# Patient Record
Sex: FEMALE | Race: WHITE | NOT HISPANIC OR LATINO | URBAN - METROPOLITAN AREA
[De-identification: names, ages, dates, MRNs, and addresses within clinical notes are randomized per-mention and may not be internally consistent; named-entity substitution may affect disease eponyms.]

---

## 2019-01-28 ENCOUNTER — OUTPATIENT (OUTPATIENT)
Dept: OUTPATIENT SERVICES | Facility: HOSPITAL | Age: 79
LOS: 1 days | Discharge: HOME | End: 2019-01-28

## 2019-01-28 VITALS — WEIGHT: 147.93 LBS | HEIGHT: 60 IN

## 2019-01-28 DIAGNOSIS — I05.0 RHEUMATIC MITRAL STENOSIS: ICD-10-CM

## 2019-01-28 DIAGNOSIS — Z82.69 FAMILY HISTORY OF OTHER DISEASES OF THE MUSCULOSKELETAL SYSTEM AND CONNECTIVE TISSUE: Chronic | ICD-10-CM

## 2019-01-28 RX ORDER — ENOXAPARIN SODIUM 100 MG/ML
70 INJECTION SUBCUTANEOUS
Qty: 14 | Refills: 0
Start: 2019-01-28 | End: 2019-02-03

## 2019-01-28 RX ORDER — APIXABAN 2.5 MG/1
0 TABLET, FILM COATED ORAL
Qty: 0 | Refills: 0 | COMMUNITY

## 2019-01-28 RX ORDER — ENOXAPARIN SODIUM 100 MG/ML
70 INJECTION SUBCUTANEOUS ONCE
Qty: 0 | Refills: 0 | Status: DISCONTINUED | OUTPATIENT
Start: 2019-01-28 | End: 2019-02-12

## 2019-01-28 RX ORDER — WARFARIN SODIUM 2.5 MG/1
1 TABLET ORAL
Qty: 7 | Refills: 0
Start: 2019-01-28 | End: 2019-02-03

## 2019-01-28 NOTE — H&P CARDIOLOGY - HISTORY OF PRESENT ILLNESS
Patient is a 78y F PMH: Mitral stenosis, afib on eliquis, hypothyroidism, DLD,  HTN, never smoker, spinal stenosis sx, L flank benign tumor removed 1973, b/l cataract sx, L knee sx. Pt had recent echo which showed mild to moderate mitral stenosis , study was limited and KAROL recommended for further evaluation. Pt reports SOB with exertion when walking up stairs, no symptoms currently at rest .        REVIEW OF SYSTEMS:  CONSTITUTIONAL: No fever, weight loss, or fatigue  CARDIOLOGY: PAtient denies chest pain, shortness of breath or syncopal episodes.   RESPIRATORY: denies shortness of breath, wheezing  NEUROLOGICAL: NO weakness, no focal deficits to report.  GI: no BRBPR, no N,V,diarrhea.     PHYSICAL EXAM:  · CONSTITUTIONAL:	Well-developed, well nourished      ·RESPIRATORY:   airway patent; breath sounds equal; good air movement; respirations non-labored; clear to auscultation bilaterally; no chest wall tenderness; no intercostal retractions; no rales,rhonchi or wheeze  · CARDIOVASCULAR	regular rate and rhythm  no rub  no murmur  normal PMI  · EXTREMITIES: No cyanosis, clubbing or edema  · VASCULAR: 	Equal and normal pulses (carotid, femoral, dorsalis pedis) Patient is a 78y F PMH: Mitral stenosis, afib on eliquis, hypothyroidism, DLD,  HTN, never smoker, spinal stenosis sx, L flank benign tumor removed 1973, b/l cataract sx, L knee sx. Pt had recent echo which showed mild to moderate mitral stenosis , study was limited and KAROL recommended for further evaluation. Pt reports SOB with exertion when walking up stairs, no symptoms currently at rest .        REVIEW OF SYSTEMS:  CONSTITUTIONAL: No fever, weight loss, or fatigue  CARDIOLOGY: PAtient denies chest pain, shortness of breath or syncopal episodes.   RESPIRATORY: denies shortness of breath, wheezing  NEUROLOGICAL: NO weakness, no focal deficits to report.  GI: no BRBPR, no N,V,diarrhea.     PHYSICAL EXAM:  · CONSTITUTIONAL:	Well-developed, well nourished      ·RESPIRATORY:   airway patent; breath sounds equal; good air movement; respirations non-labored; clear to auscultation bilaterally; no chest wall tenderness; no intercostal retractions; no rales,rhonchi or wheeze  · CARDIOVASCULAR	irregular rhythm  no rub   normal PMI SM at LSB  · EXTREMITIES: No cyanosis, clubbing or edema  · VASCULAR: 	Equal and normal pulses (carotid, femoral, dorsalis pedis)

## 2019-01-28 NOTE — CHART NOTE - NSCHARTNOTEFT_GEN_A_CORE
KAROL Post Procedure Note:    After risks, benefits and alternatives of the procedure were explained, consent was signed and placed in the medical record.  Procedural timeout was taken.  Sedation was administered by anesthesia.  KAROL probe inserted without complication and KAROL performed.  Patient tolerated the procedure well without complication.  Post-procedure vital signs were stable.      KAROL findings:  LVEF- 70 %  Thickened LV  mild MS  mitral annular calcification  mild MR  moderate TR  dilated RA  mild to moderate pulm HTN  + thrombus in DIANA  See full report for findings. KAROL Post Procedure Note:    After risks, benefits and alternatives of the procedure were explained, consent was signed and placed in the medical record.  Procedural timeout was taken.  Sedation was administered by anesthesia.  KAROL probe inserted without complication and KAROL performed.  Patient tolerated the procedure well without complication.  Post-procedure vital signs were stable.      KAROL findings:  LVEF- 70 %  Thickened LV  mild MS  mitral annular calcification  mild MR  moderate TR  dilated RA  mild to moderate pulm HTN  + thrombus in DIANA  See full report for findings.    Plan:  discussed with Dr. Curtis chaparro d/c Eliquis  start coumadin with Lovenox bridging therapy   f/u with Dr. Acevedo tomorrow in office

## 2019-05-24 PROBLEM — E03.9 HYPOTHYROIDISM, UNSPECIFIED: Chronic | Status: ACTIVE | Noted: 2019-01-25

## 2019-05-24 PROBLEM — I10 ESSENTIAL (PRIMARY) HYPERTENSION: Chronic | Status: ACTIVE | Noted: 2019-01-25

## 2019-05-24 PROBLEM — I48.91 UNSPECIFIED ATRIAL FIBRILLATION: Chronic | Status: ACTIVE | Noted: 2019-01-25

## 2019-05-28 ENCOUNTER — OUTPATIENT (OUTPATIENT)
Dept: OUTPATIENT SERVICES | Facility: HOSPITAL | Age: 79
LOS: 1 days | Discharge: HOME | End: 2019-05-28
Payer: MEDICARE

## 2019-05-28 VITALS
HEART RATE: 78 BPM | OXYGEN SATURATION: 98 % | SYSTOLIC BLOOD PRESSURE: 141 MMHG | RESPIRATION RATE: 16 BRPM | HEIGHT: 61 IN | DIASTOLIC BLOOD PRESSURE: 85 MMHG | TEMPERATURE: 98 F | WEIGHT: 151.9 LBS

## 2019-05-28 VITALS — HEIGHT: 61 IN | WEIGHT: 151.9 LBS | TEMPERATURE: 98 F | OXYGEN SATURATION: 98 %

## 2019-05-28 DIAGNOSIS — I51.3 INTRACARDIAC THROMBOSIS, NOT ELSEWHERE CLASSIFIED: ICD-10-CM

## 2019-05-28 DIAGNOSIS — Z82.69 FAMILY HISTORY OF OTHER DISEASES OF THE MUSCULOSKELETAL SYSTEM AND CONNECTIVE TISSUE: Chronic | ICD-10-CM

## 2019-05-28 LAB
APTT BLD: 51.8 SEC — HIGH (ref 27–39.2)
INR BLD: 3.51 RATIO — HIGH (ref 0.65–1.3)
PROTHROM AB SERPL-ACNC: 39.9 SEC — HIGH (ref 9.95–12.87)

## 2019-05-28 PROCEDURE — 93320 DOPPLER ECHO COMPLETE: CPT | Mod: 26

## 2019-05-28 PROCEDURE — 93325 DOPPLER ECHO COLOR FLOW MAPG: CPT | Mod: 26

## 2019-05-28 PROCEDURE — 93312 ECHO TRANSESOPHAGEAL: CPT | Mod: 26

## 2019-05-28 RX ORDER — LOSARTAN/HYDROCHLOROTHIAZIDE 100MG-25MG
1 TABLET ORAL
Qty: 0 | Refills: 0 | DISCHARGE

## 2019-05-28 NOTE — CHART NOTE - NSCHARTNOTEFT_GEN_A_CORE
KAROL Procedure Note:     After risks, benefits and alternatives of the procedure were explained, consent was signed and placed in the medical record.  Procedural timeout was taken.  Sedation was administered by anesthesia.  KAROL probe inserted without complication and KAROL performed.  Patient tolerated the procedure well without complication.  Post-procedure vital signs were stable.    KAROL findings:  LA: mild dilatation , DIANA: there is a small thrombus in the appendage with smoke , and decreased in/out velocities  LV: normal  RA: dilated  RV: dilated  TV: thicknened , there is mod TR  MV: calcified , mild MR  AV : trifileaflet  PV: normal  IA septum : intact  aorta: +atheroma      See full report for findings.  no CV due to persistent DIANA thrombus. KAROL Procedure Note:     After risks, benefits and alternatives of the procedure were explained, consent was signed and placed in the medical record.  Procedural timeout was taken.  Sedation was administered by anesthesia.  KAROL probe inserted without complication and KAROL performed.  Patient tolerated the procedure well without complication.  Post-procedure vital signs were stable.    KAROL findings:  LA: mild dilatation , DIANA: there is a small thrombus in the appendage with smoke , and decreased in/out velocities  LV: normal  RA: dilated  RV: dilated  TV: thickened , there is mod TR, PASP 55  MV: calcified , mild MR  AV : trifileaflet  PV: normal  IA septum : intact  aorta: +atheroma      See full report for findings.  no CV due to persistent DIANA thrombus.

## 2019-05-28 NOTE — H&P CARDIOLOGY - HISTORY OF PRESENT ILLNESS
78 year old female is here for KAROL she had DIANA thrombus in january and was switched to coumadin 78 year old female is here for KAROL she had DIANA thrombus in January and was switched to coumadin.

## 2019-05-28 NOTE — PRE-ANESTHESIA EVALUATION ADULT - NSANTHOSAYNRD_GEN_A_CORE
No. SANGITA screening performed.  STOP BANG Legend: 0-2 = LOW Risk; 3-4 = INTERMEDIATE Risk; 5-8 = HIGH Risk

## 2020-06-22 ENCOUNTER — OUTPATIENT (OUTPATIENT)
Dept: OUTPATIENT SERVICES | Facility: HOSPITAL | Age: 80
LOS: 1 days | Discharge: HOME | End: 2020-06-22
Payer: MEDICARE

## 2020-06-22 VITALS
OXYGEN SATURATION: 99 % | SYSTOLIC BLOOD PRESSURE: 177 MMHG | WEIGHT: 153 LBS | HEIGHT: 61 IN | HEART RATE: 73 BPM | RESPIRATION RATE: 16 BRPM | DIASTOLIC BLOOD PRESSURE: 80 MMHG | TEMPERATURE: 98 F

## 2020-06-22 VITALS — HEART RATE: 70 BPM | DIASTOLIC BLOOD PRESSURE: 90 MMHG | OXYGEN SATURATION: 94 % | SYSTOLIC BLOOD PRESSURE: 177 MMHG

## 2020-06-22 DIAGNOSIS — Z82.69 FAMILY HISTORY OF OTHER DISEASES OF THE MUSCULOSKELETAL SYSTEM AND CONNECTIVE TISSUE: Chronic | ICD-10-CM

## 2020-06-22 PROCEDURE — 93312 ECHO TRANSESOPHAGEAL: CPT | Mod: 26,XU

## 2020-06-22 NOTE — H&P CARDIOLOGY - HISTORY OF PRESENT ILLNESS
79y Female here for elective KAROL for assessment of the DIANA. Pt has h/o Afib on coumadin, HTN, hypothyroidism. Pt had DIANA thrombus seen on 2 KAROL's last yr and pt came here today for a f/u KAROL.  Patient is currently asymptomatic at rest. Pt is compliant with AC and INR is therapeutic.       Physical Exam:    General:  NAD  Neurology: A&Ox3, nonfocal  Eyes: PERRLA/ EOMI.  ENT/Neck: No JVD.    Respiratory: CTA B/L, No wheezing, rales, rhonchi  CV: S1S2 irregular, rubs or gallops.  Abdominal: Soft, NT, ND +BS.  Extremities: No edema, + peripheral pulses B/L.   Skin: No Rashes, Hematoma, Ecchymosis    Labs: Reviewed    KAROL to be performed with assistance of anaesthesiologist and cardiology fellow.

## 2020-06-23 DIAGNOSIS — I48.91 UNSPECIFIED ATRIAL FIBRILLATION: ICD-10-CM

## 2023-10-04 ENCOUNTER — APPOINTMENT (OUTPATIENT)
Dept: CV DIAGNOSITCS | Facility: HOSPITAL | Age: 83
End: 2023-10-04
Payer: MEDICARE

## 2023-10-04 ENCOUNTER — OUTPATIENT (OUTPATIENT)
Dept: OUTPATIENT SERVICES | Facility: HOSPITAL | Age: 83
LOS: 1 days | End: 2023-10-04
Payer: MEDICARE

## 2023-10-04 DIAGNOSIS — Z82.69 FAMILY HISTORY OF OTHER DISEASES OF THE MUSCULOSKELETAL SYSTEM AND CONNECTIVE TISSUE: Chronic | ICD-10-CM

## 2023-10-04 DIAGNOSIS — R06.02 SHORTNESS OF BREATH: ICD-10-CM

## 2023-10-04 DIAGNOSIS — I48.19 OTHER PERSISTENT ATRIAL FIBRILLATION: ICD-10-CM

## 2023-10-04 PROBLEM — Z00.00 ENCOUNTER FOR PREVENTIVE HEALTH EXAMINATION: Status: ACTIVE | Noted: 2023-10-04

## 2023-10-04 PROCEDURE — 93306 TTE W/DOPPLER COMPLETE: CPT | Mod: 26

## 2023-10-04 PROCEDURE — 93306 TTE W/DOPPLER COMPLETE: CPT

## 2023-10-05 DIAGNOSIS — I48.19 OTHER PERSISTENT ATRIAL FIBRILLATION: ICD-10-CM

## 2023-10-05 DIAGNOSIS — R06.02 SHORTNESS OF BREATH: ICD-10-CM

## 2024-01-08 ENCOUNTER — INPATIENT (INPATIENT)
Facility: HOSPITAL | Age: 84
LOS: 9 days | Discharge: NURSING, REHAB OR LONG-TERM CARE | DRG: 177 | End: 2024-01-18
Attending: HOSPITALIST | Admitting: STUDENT IN AN ORGANIZED HEALTH CARE EDUCATION/TRAINING PROGRAM
Payer: MEDICARE

## 2024-01-08 VITALS
DIASTOLIC BLOOD PRESSURE: 75 MMHG | HEART RATE: 71 BPM | TEMPERATURE: 98 F | OXYGEN SATURATION: 96 % | SYSTOLIC BLOOD PRESSURE: 180 MMHG | RESPIRATION RATE: 19 BRPM | WEIGHT: 139.99 LBS

## 2024-01-08 DIAGNOSIS — Z82.69 FAMILY HISTORY OF OTHER DISEASES OF THE MUSCULOSKELETAL SYSTEM AND CONNECTIVE TISSUE: Chronic | ICD-10-CM

## 2024-01-08 LAB
ALBUMIN SERPL ELPH-MCNC: 3.3 G/DL — LOW (ref 3.5–5.2)
ALBUMIN SERPL ELPH-MCNC: 3.3 G/DL — LOW (ref 3.5–5.2)
ALP SERPL-CCNC: 166 U/L — HIGH (ref 30–115)
ALP SERPL-CCNC: 166 U/L — HIGH (ref 30–115)
ALT FLD-CCNC: 21 U/L — SIGNIFICANT CHANGE UP (ref 0–41)
ALT FLD-CCNC: 21 U/L — SIGNIFICANT CHANGE UP (ref 0–41)
ANION GAP SERPL CALC-SCNC: 12 MMOL/L — SIGNIFICANT CHANGE UP (ref 7–14)
ANION GAP SERPL CALC-SCNC: 12 MMOL/L — SIGNIFICANT CHANGE UP (ref 7–14)
APTT BLD: 61.8 SEC — HIGH (ref 27–39.2)
APTT BLD: 61.8 SEC — HIGH (ref 27–39.2)
AST SERPL-CCNC: 29 U/L — SIGNIFICANT CHANGE UP (ref 0–41)
AST SERPL-CCNC: 29 U/L — SIGNIFICANT CHANGE UP (ref 0–41)
BASOPHILS # BLD AUTO: 0.04 K/UL — SIGNIFICANT CHANGE UP (ref 0–0.2)
BASOPHILS # BLD AUTO: 0.04 K/UL — SIGNIFICANT CHANGE UP (ref 0–0.2)
BASOPHILS NFR BLD AUTO: 0.5 % — SIGNIFICANT CHANGE UP (ref 0–1)
BASOPHILS NFR BLD AUTO: 0.5 % — SIGNIFICANT CHANGE UP (ref 0–1)
BILIRUB SERPL-MCNC: 1.1 MG/DL — SIGNIFICANT CHANGE UP (ref 0.2–1.2)
BILIRUB SERPL-MCNC: 1.1 MG/DL — SIGNIFICANT CHANGE UP (ref 0.2–1.2)
BUN SERPL-MCNC: 9 MG/DL — LOW (ref 10–20)
BUN SERPL-MCNC: 9 MG/DL — LOW (ref 10–20)
CALCIUM SERPL-MCNC: 8.2 MG/DL — LOW (ref 8.4–10.4)
CALCIUM SERPL-MCNC: 8.2 MG/DL — LOW (ref 8.4–10.4)
CHLORIDE SERPL-SCNC: 91 MMOL/L — LOW (ref 98–110)
CHLORIDE SERPL-SCNC: 91 MMOL/L — LOW (ref 98–110)
CO2 SERPL-SCNC: 31 MMOL/L — SIGNIFICANT CHANGE UP (ref 17–32)
CO2 SERPL-SCNC: 31 MMOL/L — SIGNIFICANT CHANGE UP (ref 17–32)
CREAT SERPL-MCNC: 0.5 MG/DL — LOW (ref 0.7–1.5)
CREAT SERPL-MCNC: 0.5 MG/DL — LOW (ref 0.7–1.5)
EGFR: 93 ML/MIN/1.73M2 — SIGNIFICANT CHANGE UP
EGFR: 93 ML/MIN/1.73M2 — SIGNIFICANT CHANGE UP
EOSINOPHIL # BLD AUTO: 0.42 K/UL — SIGNIFICANT CHANGE UP (ref 0–0.7)
EOSINOPHIL # BLD AUTO: 0.42 K/UL — SIGNIFICANT CHANGE UP (ref 0–0.7)
EOSINOPHIL NFR BLD AUTO: 5.4 % — SIGNIFICANT CHANGE UP (ref 0–8)
EOSINOPHIL NFR BLD AUTO: 5.4 % — SIGNIFICANT CHANGE UP (ref 0–8)
FLUAV AG NPH QL: SIGNIFICANT CHANGE UP
FLUAV AG NPH QL: SIGNIFICANT CHANGE UP
FLUBV AG NPH QL: SIGNIFICANT CHANGE UP
FLUBV AG NPH QL: SIGNIFICANT CHANGE UP
GAS PNL BLDV: SIGNIFICANT CHANGE UP
GAS PNL BLDV: SIGNIFICANT CHANGE UP
GLUCOSE SERPL-MCNC: 114 MG/DL — HIGH (ref 70–99)
GLUCOSE SERPL-MCNC: 114 MG/DL — HIGH (ref 70–99)
HCT VFR BLD CALC: 26.9 % — LOW (ref 37–47)
HCT VFR BLD CALC: 26.9 % — LOW (ref 37–47)
HGB BLD-MCNC: 8.6 G/DL — LOW (ref 12–16)
HGB BLD-MCNC: 8.6 G/DL — LOW (ref 12–16)
IMM GRANULOCYTES NFR BLD AUTO: 0.5 % — HIGH (ref 0.1–0.3)
IMM GRANULOCYTES NFR BLD AUTO: 0.5 % — HIGH (ref 0.1–0.3)
INR BLD: 7.79 RATIO — CRITICAL HIGH (ref 0.65–1.3)
INR BLD: 7.79 RATIO — CRITICAL HIGH (ref 0.65–1.3)
LYMPHOCYTES # BLD AUTO: 1.32 K/UL — SIGNIFICANT CHANGE UP (ref 1.2–3.4)
LYMPHOCYTES # BLD AUTO: 1.32 K/UL — SIGNIFICANT CHANGE UP (ref 1.2–3.4)
LYMPHOCYTES # BLD AUTO: 16.9 % — LOW (ref 20.5–51.1)
LYMPHOCYTES # BLD AUTO: 16.9 % — LOW (ref 20.5–51.1)
MCHC RBC-ENTMCNC: 27 PG — SIGNIFICANT CHANGE UP (ref 27–31)
MCHC RBC-ENTMCNC: 27 PG — SIGNIFICANT CHANGE UP (ref 27–31)
MCHC RBC-ENTMCNC: 32 G/DL — SIGNIFICANT CHANGE UP (ref 32–37)
MCHC RBC-ENTMCNC: 32 G/DL — SIGNIFICANT CHANGE UP (ref 32–37)
MCV RBC AUTO: 84.3 FL — SIGNIFICANT CHANGE UP (ref 81–99)
MCV RBC AUTO: 84.3 FL — SIGNIFICANT CHANGE UP (ref 81–99)
MONOCYTES # BLD AUTO: 1 K/UL — HIGH (ref 0.1–0.6)
MONOCYTES # BLD AUTO: 1 K/UL — HIGH (ref 0.1–0.6)
MONOCYTES NFR BLD AUTO: 12.8 % — HIGH (ref 1.7–9.3)
MONOCYTES NFR BLD AUTO: 12.8 % — HIGH (ref 1.7–9.3)
NEUTROPHILS # BLD AUTO: 5 K/UL — SIGNIFICANT CHANGE UP (ref 1.4–6.5)
NEUTROPHILS # BLD AUTO: 5 K/UL — SIGNIFICANT CHANGE UP (ref 1.4–6.5)
NEUTROPHILS NFR BLD AUTO: 63.9 % — SIGNIFICANT CHANGE UP (ref 42.2–75.2)
NEUTROPHILS NFR BLD AUTO: 63.9 % — SIGNIFICANT CHANGE UP (ref 42.2–75.2)
NRBC # BLD: 0 /100 WBCS — SIGNIFICANT CHANGE UP (ref 0–0)
NRBC # BLD: 0 /100 WBCS — SIGNIFICANT CHANGE UP (ref 0–0)
NT-PROBNP SERPL-SCNC: 7148 PG/ML — HIGH (ref 0–300)
NT-PROBNP SERPL-SCNC: 7148 PG/ML — HIGH (ref 0–300)
PLATELET # BLD AUTO: 279 K/UL — SIGNIFICANT CHANGE UP (ref 130–400)
PLATELET # BLD AUTO: 279 K/UL — SIGNIFICANT CHANGE UP (ref 130–400)
PMV BLD: 10.2 FL — SIGNIFICANT CHANGE UP (ref 7.4–10.4)
PMV BLD: 10.2 FL — SIGNIFICANT CHANGE UP (ref 7.4–10.4)
POTASSIUM SERPL-MCNC: 2.5 MMOL/L — CRITICAL LOW (ref 3.5–5)
POTASSIUM SERPL-MCNC: 2.5 MMOL/L — CRITICAL LOW (ref 3.5–5)
POTASSIUM SERPL-SCNC: 2.5 MMOL/L — CRITICAL LOW (ref 3.5–5)
POTASSIUM SERPL-SCNC: 2.5 MMOL/L — CRITICAL LOW (ref 3.5–5)
PROT SERPL-MCNC: 6 G/DL — SIGNIFICANT CHANGE UP (ref 6–8)
PROT SERPL-MCNC: 6 G/DL — SIGNIFICANT CHANGE UP (ref 6–8)
PROTHROM AB SERPL-ACNC: >40 SEC — HIGH (ref 9.95–12.87)
PROTHROM AB SERPL-ACNC: >40 SEC — HIGH (ref 9.95–12.87)
RBC # BLD: 3.19 M/UL — LOW (ref 4.2–5.4)
RBC # BLD: 3.19 M/UL — LOW (ref 4.2–5.4)
RBC # FLD: 15.1 % — HIGH (ref 11.5–14.5)
RBC # FLD: 15.1 % — HIGH (ref 11.5–14.5)
RSV RNA NPH QL NAA+NON-PROBE: SIGNIFICANT CHANGE UP
RSV RNA NPH QL NAA+NON-PROBE: SIGNIFICANT CHANGE UP
SARS-COV-2 RNA SPEC QL NAA+PROBE: DETECTED
SARS-COV-2 RNA SPEC QL NAA+PROBE: DETECTED
SODIUM SERPL-SCNC: 134 MMOL/L — LOW (ref 135–146)
SODIUM SERPL-SCNC: 134 MMOL/L — LOW (ref 135–146)
TROPONIN T, HIGH SENSITIVITY RESULT: 106 NG/L — CRITICAL HIGH (ref 6–13)
TROPONIN T, HIGH SENSITIVITY RESULT: 106 NG/L — CRITICAL HIGH (ref 6–13)
WBC # BLD: 7.82 K/UL — SIGNIFICANT CHANGE UP (ref 4.8–10.8)
WBC # BLD: 7.82 K/UL — SIGNIFICANT CHANGE UP (ref 4.8–10.8)
WBC # FLD AUTO: 7.82 K/UL — SIGNIFICANT CHANGE UP (ref 4.8–10.8)
WBC # FLD AUTO: 7.82 K/UL — SIGNIFICANT CHANGE UP (ref 4.8–10.8)

## 2024-01-08 PROCEDURE — 71045 X-RAY EXAM CHEST 1 VIEW: CPT | Mod: 26

## 2024-01-08 PROCEDURE — 93010 ELECTROCARDIOGRAM REPORT: CPT

## 2024-01-08 PROCEDURE — 99285 EMERGENCY DEPT VISIT HI MDM: CPT

## 2024-01-08 RX ORDER — POTASSIUM CHLORIDE 20 MEQ
40 PACKET (EA) ORAL ONCE
Refills: 0 | Status: COMPLETED | OUTPATIENT
Start: 2024-01-08 | End: 2024-01-08

## 2024-01-08 RX ORDER — POTASSIUM CHLORIDE 20 MEQ
20 PACKET (EA) ORAL ONCE
Refills: 0 | Status: COMPLETED | OUTPATIENT
Start: 2024-01-08 | End: 2024-01-08

## 2024-01-08 NOTE — ED PROVIDER NOTE - WR INTERPRETATION 1
CXR inter by me--+right pleural effusion, +increased vascular markings, no ptx, +sternotomy wires noted

## 2024-01-08 NOTE — ED PROVIDER NOTE - CONSULTANT FREE TEXT FOR MDM DISCUSSED CASE WITH QUESTION
fito fito, [facility] fito, [facility] cardio fito, ICU fellow

## 2024-01-08 NOTE — ED PROVIDER NOTE - WHICH SHOWED
#1 EKG: wide QRS reg rhythm, LAD, Vent Rate 61, , QTc 489    CXR--inter by me--+right pleural effusion, +vascular congestion, sternal wires noted

## 2024-01-08 NOTE — ED PROVIDER NOTE - PROGRESS NOTE DETAILS
KA - patient became hypoxic to 86 RA upon marching in place. 2L nasal cannula initiated with return to baseline. Dianne - Cardio evaluated patient. Discussed disposition planning. No cardio tele beds available. Patient does not require cardiac SDU at this time. Admit to med tele pending CT chest read. KA - Cardio fellow consulted will see patient. Discussed case with ICU fellow, pt approved for SDU. Signed out pt to MAR.

## 2024-01-08 NOTE — ED PROVIDER NOTE - ATTENDING APP SHARED VISIT CONTRIBUTION OF CARE
83-year-old female with past medical history of A-fib, HTN, hypothyroid, cardiac valve disease, [facility] heart surgeries (had one valve repair and another valve replacement with porcine valve), pacemaker placement on 12/17, sent home on 12/20, but returned to hospital on 12/21 secondary to shortness of breath and fluid buildup, went to [facility] and then transferred back to Health system and had chest tube placed (now removed), and PICC line placed for IV antibiotics for pneumonia (ertapenem to be continued until January 20) but she continued to have cough/shortness of breath and finally tested for COVID which was positive on January 5, 2024.  PMD evaluated patient and concerned about her weakness/shortness of breath/COVID status and sent her to [facility] ER for further evaluation.  Daughter mentions that patient's INR was also elevated (result was 8 and outpatient lab).  Patient still able to tolerate p.o. intake but has decreased appetite, and easily fatigued.  When patient tries to walk she gets acutely short of breath.  Also noted to have increased swelling of her bilateral lower extremities.  Denies any complaints of fever/chills/abdominal pain/ complaints/rashes.    Agree with PA exam management.  Will check labs, urine, EKG, x-ray, CT chest and cardiac fellow evaluation.  Patient to be admitted as pulse ox checked when patient moving and it drops to the 80s on room air.  Patient therefore placed on 2 L nasal cannula. Pt to be admitted. 83-year-old female with past medical history of A-fib, HTN, hypothyroid, cardiac valve disease, admitted at [facility] December 13, 2023 for open heart surgeries (had one valve repair and another valve replacement with porcine valve), pacemaker placement on 12/17, sent home on 12/20, but returned to hospital on 12/21 secondary to shortness of breath and fluid buildup, went to [facility] and then transferred back to [facility] and had chest tube placed (now removed), and PICC line placed for IV antibiotics for pneumonia (ertapenem to be continued until January 20) but she continued to have cough/shortness of breath and finally tested for COVID which was positive on January 5, 2024.  PMD evaluated patient and concerned about her weakness/shortness of breath/COVID status and sent her to [facility] ER for further evaluation.  Daughter mentions that patient's INR was also elevated (result was 8 and outpatient lab).  Patient still able to tolerate p.o. intake but has decreased appetite, and easily fatigued.  When patient tries to walk she gets acutely short of breath.  Also noted to have increased swelling of her bilateral lower extremities.  Denies any complaints of fever/chills/abdominal pain/ complaints/rashes.    Agree with PA exam management.  Will check labs, urine, EKG, x-ray, CT chest and cardiac fellow evaluation.  Patient to be admitted as pulse ox checked when patient moving and it drops to the 80s on room air.  Patient therefore placed on 2 L nasal cannula. Pt to be admitted.

## 2024-01-08 NOTE — ED PROVIDER NOTE - CLINICAL SUMMARY MEDICAL DECISION MAKING FREE TEXT BOX
83-year-old female with past medical history of A-fib, HTN, hypothyroid, cardiac valve disease, admitted at [facility] December 13, 2023 for open heart surgeries (had one valve repair and another valve replacement with porcine valve), pacemaker placement on 12/17, sent home on 12/20, but returned to hospital on 12/21 secondary to shortness of breath and fluid buildup, went to [facility] and then transferred back to [facility] and had chest tube placed (now removed), and PICC line placed for IV antibiotics for pneumonia (ertapenem to be continued until January 20) but she continued to have cough/shortness of breath and finally tested for COVID which was positive on January 5, 2024.  PMD evaluated patient and concerned about her weakness/shortness of breath/COVID status and sent her to [facility] ER for further evaluation.  Daughter mentions that patient's INR was also elevated (result was 8 and outpatient lab).  Patient still able to tolerate p.o. intake but has decreased appetite, and easily fatigued.  When patient tries to walk she gets acutely short of breath.  Also noted to have increased swelling of her bilateral lower extremities.  Denies any complaints of fever/chills/abdominal pain/ complaints/rashes.    Agree with PA exam management.  Ordered labs, urine, EKG, x-ray, CT chest and cardiac fellow evaluation, ICU consult.  Patient to be admitted as +covid and pulse ox when patient moving drops to the 80s on room air.  Patient therefore placed on 2 L nasal cannula. Pt to be admitted because of elevated trop, elevated bnp, low K, and elevated INR. Discussed with ICU fellow, admit to SDU 83-year-old female with past medical history of A-fib, HTN, hypothyroid, cardiac valve disease, admitted at [facility] December 13, 2023 for open heart surgeries (had one valve repair and another valve replacement with porcine valve), pacemaker placement on 12/17, sent home on 12/20, but returned to hospital on 12/21 secondary to shortness of breath and fluid buildup, went to [facility] and then transferred back to [facility] and had chest tube placed (now removed), and PICC line placed for IV antibiotics for pneumonia (ertapenem to be continued until January 20) but she continued to have cough/shortness of breath and finally tested for COVID which was positive on January 5, 2024.  PMD evaluated patient and concerned about her weakness/shortness of breath/COVID status and sent her to [facility] ER for further evaluation.  Daughter mentions that patient's INR was also elevated (result was 8 and outpatient lab).  Patient still able to tolerate p.o. intake but has decreased appetite, and easily fatigued.  When patient tries to walk she gets acutely short of breath.  Also noted to have increased swelling of her bilateral lower extremities.  Denies any complaints of fever/chills/abdominal pain/ complaints/rashes.    Agree with PA exam management.  Ordered labs, urine, EKG, x-ray, CT chest and cardiac fellow evaluation, ICU consult.  Patient to be admitted as +covid and pulse ox when patient moving drops to the 80s on room air.  Patient therefore placed on 2 L nasal cannula. Pt to be admitted because of elevated trop, elevated bnp, low K, and elevated INR. Discussed with ICU fellow, admit to SDU

## 2024-01-08 NOTE — ED PROVIDER NOTE - OBJECTIVE STATEMENT
Patient is a 83-year-old female with past medical history of hypertension, hypothyroidism, valve replacement surgery and pacemaker placement in mid December, further admission for pneumonia and discharged with left-sided PICC line on ertapenem on warfarin presents for evaluation of cough and shortness of breath.  Patient tested COVID-positive on January 5.  She can ambulate a very short distance which causes severe shortness of breath.  She was told her INR was severely elevated on outpatient labs.  She was ultimately referred in by a visiting primary care provider for concerns of her respiratory status.  She denies any fever, chest pain, abdominal pain, urinary complaints.

## 2024-01-08 NOTE — ED PROVIDER NOTE - WHO RECOMMENDED
cardiac fellow Judah, no INR reversal with vit K, no cardiac step down, will see pt. cardiac fellow Judah, no INR reversal with vit K, no cardiac step down, will see pt. after seeing patient oral vit K ok to give and to hold lasix until potassium is corrected.

## 2024-01-08 NOTE — ED PROVIDER NOTE - PHYSICAL EXAMINATION
As Follows:  CONST: Well appearing in NAD  EYES: PERRL, EOMI, Sclera and conjunctiva clear.   ENT: No nasal discharge. Oropharynx normal appearing, no erythema or exudates. Uvula midline  CARD: No murmurs, rubs, or gallops; Normal rate and rhythm  RESP: BS Equal B/L, No wheezes, rhonchi or rales. Hypoxia to 86 on RA. Comfortable on 2L NC.   GI: Soft, non-tender, non-distended.  SKIN: Post surgical sites without acute drainage or discharge. Otherwise Warm, dry, no acute rashes. MMM  NEURO: A&Ox3, No focal deficits. Strength and sensation intact.

## 2024-01-08 NOTE — ED PROVIDER NOTE - CARE PLAN
Principal Discharge DX:	COVID  Secondary Diagnosis:	Hypoxia  Secondary Diagnosis:	Coumadin toxicity   1 Principal Discharge DX:	COVID  Secondary Diagnosis:	Hypoxia  Secondary Diagnosis:	Coumadin toxicity  Secondary Diagnosis:	Elevated troponin  Secondary Diagnosis:	Fluid overload

## 2024-01-09 DIAGNOSIS — B97.89 OTHER VIRAL AGENTS AS THE CAUSE OF DISEASES CLASSIFIED ELSEWHERE: ICD-10-CM

## 2024-01-09 DIAGNOSIS — R77.8 OTHER SPECIFIED ABNORMALITIES OF PLASMA PROTEINS: ICD-10-CM

## 2024-01-09 LAB
ALBUMIN SERPL ELPH-MCNC: 3.3 G/DL — LOW (ref 3.5–5.2)
ALBUMIN SERPL ELPH-MCNC: 3.3 G/DL — LOW (ref 3.5–5.2)
ALBUMIN SERPL ELPH-MCNC: 3.4 G/DL — LOW (ref 3.5–5.2)
ALBUMIN SERPL ELPH-MCNC: 3.4 G/DL — LOW (ref 3.5–5.2)
ALBUMIN SERPL ELPH-MCNC: 3.5 G/DL — SIGNIFICANT CHANGE UP (ref 3.5–5.2)
ALBUMIN SERPL ELPH-MCNC: 3.5 G/DL — SIGNIFICANT CHANGE UP (ref 3.5–5.2)
ALP SERPL-CCNC: 156 U/L — HIGH (ref 30–115)
ALP SERPL-CCNC: 156 U/L — HIGH (ref 30–115)
ALP SERPL-CCNC: 161 U/L — HIGH (ref 30–115)
ALP SERPL-CCNC: 161 U/L — HIGH (ref 30–115)
ALP SERPL-CCNC: 163 U/L — HIGH (ref 30–115)
ALP SERPL-CCNC: 163 U/L — HIGH (ref 30–115)
ALT FLD-CCNC: 21 U/L — SIGNIFICANT CHANGE UP (ref 0–41)
ALT FLD-CCNC: 21 U/L — SIGNIFICANT CHANGE UP (ref 0–41)
ALT FLD-CCNC: 22 U/L — SIGNIFICANT CHANGE UP (ref 0–41)
ALT FLD-CCNC: 22 U/L — SIGNIFICANT CHANGE UP (ref 0–41)
ALT FLD-CCNC: 23 U/L — SIGNIFICANT CHANGE UP (ref 0–41)
ALT FLD-CCNC: 23 U/L — SIGNIFICANT CHANGE UP (ref 0–41)
ANION GAP SERPL CALC-SCNC: 14 MMOL/L — SIGNIFICANT CHANGE UP (ref 7–14)
ANION GAP SERPL CALC-SCNC: 14 MMOL/L — SIGNIFICANT CHANGE UP (ref 7–14)
ANION GAP SERPL CALC-SCNC: 16 MMOL/L — HIGH (ref 7–14)
ANION GAP SERPL CALC-SCNC: 16 MMOL/L — HIGH (ref 7–14)
APTT BLD: 61.4 SEC — HIGH (ref 27–39.2)
APTT BLD: 61.4 SEC — HIGH (ref 27–39.2)
AST SERPL-CCNC: 29 U/L — SIGNIFICANT CHANGE UP (ref 0–41)
AST SERPL-CCNC: 29 U/L — SIGNIFICANT CHANGE UP (ref 0–41)
AST SERPL-CCNC: 32 U/L — SIGNIFICANT CHANGE UP (ref 0–41)
AST SERPL-CCNC: 32 U/L — SIGNIFICANT CHANGE UP (ref 0–41)
AST SERPL-CCNC: 34 U/L — SIGNIFICANT CHANGE UP (ref 0–41)
AST SERPL-CCNC: 34 U/L — SIGNIFICANT CHANGE UP (ref 0–41)
BASOPHILS # BLD AUTO: 0.02 K/UL — SIGNIFICANT CHANGE UP (ref 0–0.2)
BASOPHILS # BLD AUTO: 0.02 K/UL — SIGNIFICANT CHANGE UP (ref 0–0.2)
BASOPHILS NFR BLD AUTO: 0.2 % — SIGNIFICANT CHANGE UP (ref 0–1)
BASOPHILS NFR BLD AUTO: 0.2 % — SIGNIFICANT CHANGE UP (ref 0–1)
BILIRUB DIRECT SERPL-MCNC: 0.4 MG/DL — HIGH (ref 0–0.3)
BILIRUB INDIRECT FLD-MCNC: 0.6 MG/DL — SIGNIFICANT CHANGE UP (ref 0.2–1.2)
BILIRUB INDIRECT FLD-MCNC: 0.6 MG/DL — SIGNIFICANT CHANGE UP (ref 0.2–1.2)
BILIRUB INDIRECT FLD-MCNC: 0.7 MG/DL — SIGNIFICANT CHANGE UP (ref 0.2–1.2)
BILIRUB INDIRECT FLD-MCNC: 0.7 MG/DL — SIGNIFICANT CHANGE UP (ref 0.2–1.2)
BILIRUB SERPL-MCNC: 1 MG/DL — SIGNIFICANT CHANGE UP (ref 0.2–1.2)
BILIRUB SERPL-MCNC: 1 MG/DL — SIGNIFICANT CHANGE UP (ref 0.2–1.2)
BILIRUB SERPL-MCNC: 1.1 MG/DL — SIGNIFICANT CHANGE UP (ref 0.2–1.2)
BUN SERPL-MCNC: 10 MG/DL — SIGNIFICANT CHANGE UP (ref 10–20)
BUN SERPL-MCNC: 10 MG/DL — SIGNIFICANT CHANGE UP (ref 10–20)
BUN SERPL-MCNC: 9 MG/DL — LOW (ref 10–20)
BUN SERPL-MCNC: 9 MG/DL — LOW (ref 10–20)
CALCIUM SERPL-MCNC: 7.6 MG/DL — LOW (ref 8.4–10.5)
CALCIUM SERPL-MCNC: 7.6 MG/DL — LOW (ref 8.4–10.5)
CALCIUM SERPL-MCNC: 7.7 MG/DL — LOW (ref 8.4–10.5)
CALCIUM SERPL-MCNC: 7.7 MG/DL — LOW (ref 8.4–10.5)
CHLORIDE SERPL-SCNC: 96 MMOL/L — LOW (ref 98–110)
CHLORIDE SERPL-SCNC: 96 MMOL/L — LOW (ref 98–110)
CHLORIDE SERPL-SCNC: 97 MMOL/L — LOW (ref 98–110)
CHLORIDE SERPL-SCNC: 97 MMOL/L — LOW (ref 98–110)
CO2 SERPL-SCNC: 23 MMOL/L — SIGNIFICANT CHANGE UP (ref 17–32)
CO2 SERPL-SCNC: 23 MMOL/L — SIGNIFICANT CHANGE UP (ref 17–32)
CO2 SERPL-SCNC: 26 MMOL/L — SIGNIFICANT CHANGE UP (ref 17–32)
CO2 SERPL-SCNC: 26 MMOL/L — SIGNIFICANT CHANGE UP (ref 17–32)
CREAT SERPL-MCNC: 0.5 MG/DL — LOW (ref 0.7–1.5)
CREAT SERPL-MCNC: <0.5 MG/DL — LOW (ref 0.7–1.5)
EGFR: 93 ML/MIN/1.73M2 — SIGNIFICANT CHANGE UP
EGFR: 98 ML/MIN/1.73M2 — SIGNIFICANT CHANGE UP
EOSINOPHIL # BLD AUTO: 0.03 K/UL — SIGNIFICANT CHANGE UP (ref 0–0.7)
EOSINOPHIL # BLD AUTO: 0.03 K/UL — SIGNIFICANT CHANGE UP (ref 0–0.7)
EOSINOPHIL NFR BLD AUTO: 0.3 % — SIGNIFICANT CHANGE UP (ref 0–8)
EOSINOPHIL NFR BLD AUTO: 0.3 % — SIGNIFICANT CHANGE UP (ref 0–8)
GLUCOSE SERPL-MCNC: 116 MG/DL — HIGH (ref 70–99)
GLUCOSE SERPL-MCNC: 116 MG/DL — HIGH (ref 70–99)
GLUCOSE SERPL-MCNC: 125 MG/DL — HIGH (ref 70–99)
GLUCOSE SERPL-MCNC: 125 MG/DL — HIGH (ref 70–99)
HCT VFR BLD CALC: 26.9 % — LOW (ref 37–47)
HCT VFR BLD CALC: 26.9 % — LOW (ref 37–47)
HGB BLD-MCNC: 8.5 G/DL — LOW (ref 12–16)
HGB BLD-MCNC: 8.5 G/DL — LOW (ref 12–16)
IMM GRANULOCYTES NFR BLD AUTO: 0.4 % — HIGH (ref 0.1–0.3)
IMM GRANULOCYTES NFR BLD AUTO: 0.4 % — HIGH (ref 0.1–0.3)
INR BLD: 5.02 RATIO — CRITICAL HIGH (ref 0.65–1.3)
INR BLD: 5.02 RATIO — CRITICAL HIGH (ref 0.65–1.3)
INR BLD: 7.74 RATIO — CRITICAL HIGH (ref 0.65–1.3)
INR BLD: 7.74 RATIO — CRITICAL HIGH (ref 0.65–1.3)
IRON SATN MFR SERPL: 14 % — LOW (ref 15–50)
IRON SATN MFR SERPL: 14 % — LOW (ref 15–50)
IRON SATN MFR SERPL: 35 UG/DL — SIGNIFICANT CHANGE UP (ref 35–150)
IRON SATN MFR SERPL: 35 UG/DL — SIGNIFICANT CHANGE UP (ref 35–150)
LYMPHOCYTES # BLD AUTO: 0.8 K/UL — LOW (ref 1.2–3.4)
LYMPHOCYTES # BLD AUTO: 0.8 K/UL — LOW (ref 1.2–3.4)
LYMPHOCYTES # BLD AUTO: 8.6 % — LOW (ref 20.5–51.1)
LYMPHOCYTES # BLD AUTO: 8.6 % — LOW (ref 20.5–51.1)
MCHC RBC-ENTMCNC: 27.2 PG — SIGNIFICANT CHANGE UP (ref 27–31)
MCHC RBC-ENTMCNC: 27.2 PG — SIGNIFICANT CHANGE UP (ref 27–31)
MCHC RBC-ENTMCNC: 31.6 G/DL — LOW (ref 32–37)
MCHC RBC-ENTMCNC: 31.6 G/DL — LOW (ref 32–37)
MCV RBC AUTO: 85.9 FL — SIGNIFICANT CHANGE UP (ref 81–99)
MCV RBC AUTO: 85.9 FL — SIGNIFICANT CHANGE UP (ref 81–99)
MONOCYTES # BLD AUTO: 0.69 K/UL — HIGH (ref 0.1–0.6)
MONOCYTES # BLD AUTO: 0.69 K/UL — HIGH (ref 0.1–0.6)
MONOCYTES NFR BLD AUTO: 7.5 % — SIGNIFICANT CHANGE UP (ref 1.7–9.3)
MONOCYTES NFR BLD AUTO: 7.5 % — SIGNIFICANT CHANGE UP (ref 1.7–9.3)
NEUTROPHILS # BLD AUTO: 7.67 K/UL — HIGH (ref 1.4–6.5)
NEUTROPHILS # BLD AUTO: 7.67 K/UL — HIGH (ref 1.4–6.5)
NEUTROPHILS NFR BLD AUTO: 83 % — HIGH (ref 42.2–75.2)
NEUTROPHILS NFR BLD AUTO: 83 % — HIGH (ref 42.2–75.2)
NRBC # BLD: 0 /100 WBCS — SIGNIFICANT CHANGE UP (ref 0–0)
NRBC # BLD: 0 /100 WBCS — SIGNIFICANT CHANGE UP (ref 0–0)
PLATELET # BLD AUTO: 266 K/UL — SIGNIFICANT CHANGE UP (ref 130–400)
PLATELET # BLD AUTO: 266 K/UL — SIGNIFICANT CHANGE UP (ref 130–400)
PMV BLD: 10.3 FL — SIGNIFICANT CHANGE UP (ref 7.4–10.4)
PMV BLD: 10.3 FL — SIGNIFICANT CHANGE UP (ref 7.4–10.4)
POTASSIUM SERPL-MCNC: 3.8 MMOL/L — SIGNIFICANT CHANGE UP (ref 3.5–5)
POTASSIUM SERPL-MCNC: 3.8 MMOL/L — SIGNIFICANT CHANGE UP (ref 3.5–5)
POTASSIUM SERPL-MCNC: 4.1 MMOL/L — SIGNIFICANT CHANGE UP (ref 3.5–5)
POTASSIUM SERPL-MCNC: 4.1 MMOL/L — SIGNIFICANT CHANGE UP (ref 3.5–5)
POTASSIUM SERPL-SCNC: 3.8 MMOL/L — SIGNIFICANT CHANGE UP (ref 3.5–5)
POTASSIUM SERPL-SCNC: 3.8 MMOL/L — SIGNIFICANT CHANGE UP (ref 3.5–5)
POTASSIUM SERPL-SCNC: 4.1 MMOL/L — SIGNIFICANT CHANGE UP (ref 3.5–5)
POTASSIUM SERPL-SCNC: 4.1 MMOL/L — SIGNIFICANT CHANGE UP (ref 3.5–5)
PROT SERPL-MCNC: 5.8 G/DL — LOW (ref 6–8)
PROT SERPL-MCNC: 5.8 G/DL — LOW (ref 6–8)
PROT SERPL-MCNC: 5.9 G/DL — LOW (ref 6–8)
PROT SERPL-MCNC: 5.9 G/DL — LOW (ref 6–8)
PROT SERPL-MCNC: 6 G/DL — SIGNIFICANT CHANGE UP (ref 6–8)
PROT SERPL-MCNC: 6 G/DL — SIGNIFICANT CHANGE UP (ref 6–8)
PROTHROM AB SERPL-ACNC: >40 SEC — HIGH (ref 9.95–12.87)
RBC # BLD: 3.13 M/UL — LOW (ref 4.2–5.4)
RBC # BLD: 3.13 M/UL — LOW (ref 4.2–5.4)
RBC # FLD: 15.6 % — HIGH (ref 11.5–14.5)
RBC # FLD: 15.6 % — HIGH (ref 11.5–14.5)
SODIUM SERPL-SCNC: 135 MMOL/L — SIGNIFICANT CHANGE UP (ref 135–146)
SODIUM SERPL-SCNC: 135 MMOL/L — SIGNIFICANT CHANGE UP (ref 135–146)
SODIUM SERPL-SCNC: 137 MMOL/L — SIGNIFICANT CHANGE UP (ref 135–146)
SODIUM SERPL-SCNC: 137 MMOL/L — SIGNIFICANT CHANGE UP (ref 135–146)
TIBC SERPL-MCNC: 247 UG/DL — SIGNIFICANT CHANGE UP (ref 220–430)
TIBC SERPL-MCNC: 247 UG/DL — SIGNIFICANT CHANGE UP (ref 220–430)
TROPONIN T, HIGH SENSITIVITY RESULT: 102 NG/L — CRITICAL HIGH (ref 6–13)
TROPONIN T, HIGH SENSITIVITY RESULT: 102 NG/L — CRITICAL HIGH (ref 6–13)
UIBC SERPL-MCNC: 212 UG/DL — SIGNIFICANT CHANGE UP (ref 110–370)
UIBC SERPL-MCNC: 212 UG/DL — SIGNIFICANT CHANGE UP (ref 110–370)
WBC # BLD: 9.25 K/UL — SIGNIFICANT CHANGE UP (ref 4.8–10.8)
WBC # BLD: 9.25 K/UL — SIGNIFICANT CHANGE UP (ref 4.8–10.8)
WBC # FLD AUTO: 9.25 K/UL — SIGNIFICANT CHANGE UP (ref 4.8–10.8)
WBC # FLD AUTO: 9.25 K/UL — SIGNIFICANT CHANGE UP (ref 4.8–10.8)

## 2024-01-09 PROCEDURE — 83615 LACTATE (LD) (LDH) ENZYME: CPT

## 2024-01-09 PROCEDURE — 97110 THERAPEUTIC EXERCISES: CPT | Mod: GP

## 2024-01-09 PROCEDURE — 82728 ASSAY OF FERRITIN: CPT

## 2024-01-09 PROCEDURE — 82607 VITAMIN B-12: CPT

## 2024-01-09 PROCEDURE — 85045 AUTOMATED RETICULOCYTE COUNT: CPT

## 2024-01-09 PROCEDURE — 83735 ASSAY OF MAGNESIUM: CPT

## 2024-01-09 PROCEDURE — 85610 PROTHROMBIN TIME: CPT

## 2024-01-09 PROCEDURE — 83550 IRON BINDING TEST: CPT

## 2024-01-09 PROCEDURE — 36415 COLL VENOUS BLD VENIPUNCTURE: CPT

## 2024-01-09 PROCEDURE — 82565 ASSAY OF CREATININE: CPT

## 2024-01-09 PROCEDURE — 82746 ASSAY OF FOLIC ACID SERUM: CPT

## 2024-01-09 PROCEDURE — 93306 TTE W/DOPPLER COMPLETE: CPT

## 2024-01-09 PROCEDURE — 99223 1ST HOSP IP/OBS HIGH 75: CPT

## 2024-01-09 PROCEDURE — 85730 THROMBOPLASTIN TIME PARTIAL: CPT

## 2024-01-09 PROCEDURE — 97162 PT EVAL MOD COMPLEX 30 MIN: CPT | Mod: GP

## 2024-01-09 PROCEDURE — 71275 CT ANGIOGRAPHY CHEST: CPT | Mod: 26,MA

## 2024-01-09 PROCEDURE — 83540 ASSAY OF IRON: CPT

## 2024-01-09 PROCEDURE — 71045 X-RAY EXAM CHEST 1 VIEW: CPT

## 2024-01-09 PROCEDURE — 93010 ELECTROCARDIOGRAM REPORT: CPT

## 2024-01-09 PROCEDURE — 80076 HEPATIC FUNCTION PANEL: CPT

## 2024-01-09 PROCEDURE — 97116 GAIT TRAINING THERAPY: CPT | Mod: GP

## 2024-01-09 PROCEDURE — 84145 PROCALCITONIN (PCT): CPT

## 2024-01-09 PROCEDURE — 83010 ASSAY OF HAPTOGLOBIN QUANT: CPT

## 2024-01-09 PROCEDURE — 85025 COMPLETE CBC W/AUTO DIFF WBC: CPT

## 2024-01-09 PROCEDURE — 80048 BASIC METABOLIC PNL TOTAL CA: CPT

## 2024-01-09 PROCEDURE — 99222 1ST HOSP IP/OBS MODERATE 55: CPT

## 2024-01-09 PROCEDURE — 80053 COMPREHEN METABOLIC PANEL: CPT

## 2024-01-09 RX ORDER — KETOTIFEN FUMARATE 0.34 MG/ML
1 SOLUTION OPHTHALMIC DAILY
Refills: 0 | Status: DISCONTINUED | OUTPATIENT
Start: 2024-01-09 | End: 2024-01-18

## 2024-01-09 RX ORDER — MEROPENEM 1 G/30ML
1000 INJECTION INTRAVENOUS EVERY 8 HOURS
Refills: 0 | Status: COMPLETED | OUTPATIENT
Start: 2024-01-09 | End: 2024-01-17

## 2024-01-09 RX ORDER — REMDESIVIR 5 MG/ML
200 INJECTION INTRAVENOUS EVERY 24 HOURS
Refills: 0 | Status: COMPLETED | OUTPATIENT
Start: 2024-01-09 | End: 2024-01-09

## 2024-01-09 RX ORDER — POLYETHYLENE GLYCOL 3350 17 G/17G
17 POWDER, FOR SOLUTION ORAL DAILY
Refills: 0 | Status: DISCONTINUED | OUTPATIENT
Start: 2024-01-09 | End: 2024-01-18

## 2024-01-09 RX ORDER — METOPROLOL TARTRATE 50 MG
50 TABLET ORAL DAILY
Refills: 0 | Status: DISCONTINUED | OUTPATIENT
Start: 2024-01-09 | End: 2024-01-18

## 2024-01-09 RX ORDER — ALBUTEROL 90 UG/1
2 AEROSOL, METERED ORAL EVERY 6 HOURS
Refills: 0 | Status: DISCONTINUED | OUTPATIENT
Start: 2024-01-09 | End: 2024-01-18

## 2024-01-09 RX ORDER — LEVOTHYROXINE SODIUM 125 MCG
1 TABLET ORAL
Qty: 0 | Refills: 0 | DISCHARGE

## 2024-01-09 RX ORDER — FUROSEMIDE 40 MG
40 TABLET ORAL
Refills: 0 | Status: DISCONTINUED | OUTPATIENT
Start: 2024-01-09 | End: 2024-01-11

## 2024-01-09 RX ORDER — ERTAPENEM SODIUM 1 G/1
INJECTION, POWDER, LYOPHILIZED, FOR SOLUTION INTRAMUSCULAR; INTRAVENOUS
Refills: 0 | Status: DISCONTINUED | OUTPATIENT
Start: 2024-01-09 | End: 2024-01-09

## 2024-01-09 RX ORDER — PHYTONADIONE (VIT K1) 5 MG
2.5 TABLET ORAL ONCE
Refills: 0 | Status: COMPLETED | OUTPATIENT
Start: 2024-01-09 | End: 2024-01-09

## 2024-01-09 RX ORDER — VALSARTAN 80 MG/1
160 TABLET ORAL DAILY
Refills: 0 | Status: DISCONTINUED | OUTPATIENT
Start: 2024-01-09 | End: 2024-01-18

## 2024-01-09 RX ORDER — LOSARTAN/HYDROCHLOROTHIAZIDE 100MG-25MG
1 TABLET ORAL
Qty: 0 | Refills: 0 | DISCHARGE

## 2024-01-09 RX ORDER — AMLODIPINE BESYLATE 2.5 MG/1
5 TABLET ORAL DAILY
Refills: 0 | Status: DISCONTINUED | OUTPATIENT
Start: 2024-01-09 | End: 2024-01-18

## 2024-01-09 RX ORDER — ERTAPENEM SODIUM 1 G/1
1000 INJECTION, POWDER, LYOPHILIZED, FOR SOLUTION INTRAMUSCULAR; INTRAVENOUS EVERY 24 HOURS
Refills: 0 | Status: DISCONTINUED | OUTPATIENT
Start: 2024-01-09 | End: 2024-01-09

## 2024-01-09 RX ORDER — REMDESIVIR 5 MG/ML
INJECTION INTRAVENOUS
Refills: 0 | Status: DISCONTINUED | OUTPATIENT
Start: 2024-01-09 | End: 2024-01-09

## 2024-01-09 RX ORDER — REMDESIVIR 5 MG/ML
INJECTION INTRAVENOUS
Refills: 0 | Status: COMPLETED | OUTPATIENT
Start: 2024-01-09 | End: 2024-01-13

## 2024-01-09 RX ORDER — POTASSIUM CHLORIDE 20 MEQ
20 PACKET (EA) ORAL ONCE
Refills: 0 | Status: COMPLETED | OUTPATIENT
Start: 2024-01-09 | End: 2024-01-09

## 2024-01-09 RX ORDER — LEVOTHYROXINE SODIUM 125 MCG
25 TABLET ORAL DAILY
Refills: 0 | Status: DISCONTINUED | OUTPATIENT
Start: 2024-01-09 | End: 2024-01-18

## 2024-01-09 RX ORDER — PANTOPRAZOLE SODIUM 20 MG/1
40 TABLET, DELAYED RELEASE ORAL
Refills: 0 | Status: DISCONTINUED | OUTPATIENT
Start: 2024-01-09 | End: 2024-01-18

## 2024-01-09 RX ORDER — OMEPRAZOLE 10 MG/1
1 CAPSULE, DELAYED RELEASE ORAL
Qty: 0 | Refills: 0 | DISCHARGE

## 2024-01-09 RX ORDER — ASPIRIN/CALCIUM CARB/MAGNESIUM 324 MG
1 TABLET ORAL
Qty: 0 | Refills: 0 | DISCHARGE

## 2024-01-09 RX ORDER — DEXAMETHASONE 0.5 MG/5ML
6 ELIXIR ORAL DAILY
Refills: 0 | Status: DISCONTINUED | OUTPATIENT
Start: 2024-01-09 | End: 2024-01-17

## 2024-01-09 RX ORDER — ATORVASTATIN CALCIUM 80 MG/1
80 TABLET, FILM COATED ORAL AT BEDTIME
Refills: 0 | Status: DISCONTINUED | OUTPATIENT
Start: 2024-01-09 | End: 2024-01-18

## 2024-01-09 RX ORDER — METOPROLOL TARTRATE 50 MG
1 TABLET ORAL
Qty: 0 | Refills: 0 | DISCHARGE

## 2024-01-09 RX ORDER — REMDESIVIR 5 MG/ML
100 INJECTION INTRAVENOUS EVERY 24 HOURS
Refills: 0 | Status: COMPLETED | OUTPATIENT
Start: 2024-01-10 | End: 2024-01-13

## 2024-01-09 RX ADMIN — Medication 40 MILLIGRAM(S): at 17:22

## 2024-01-09 RX ADMIN — PANTOPRAZOLE SODIUM 40 MILLIGRAM(S): 20 TABLET, DELAYED RELEASE ORAL at 07:45

## 2024-01-09 RX ADMIN — ATORVASTATIN CALCIUM 80 MILLIGRAM(S): 80 TABLET, FILM COATED ORAL at 21:55

## 2024-01-09 RX ADMIN — Medication 40 MILLIGRAM(S): at 12:19

## 2024-01-09 RX ADMIN — REMDESIVIR 200 MILLIGRAM(S): 5 INJECTION INTRAVENOUS at 15:40

## 2024-01-09 RX ADMIN — Medication 6 MILLIGRAM(S): at 17:28

## 2024-01-09 RX ADMIN — Medication 50 MILLIEQUIVALENT(S): at 00:29

## 2024-01-09 RX ADMIN — MEROPENEM 100 MILLIGRAM(S): 1 INJECTION INTRAVENOUS at 21:55

## 2024-01-09 RX ADMIN — AMLODIPINE BESYLATE 5 MILLIGRAM(S): 2.5 TABLET ORAL at 07:28

## 2024-01-09 RX ADMIN — VALSARTAN 160 MILLIGRAM(S): 80 TABLET ORAL at 07:28

## 2024-01-09 RX ADMIN — Medication 25 MICROGRAM(S): at 09:46

## 2024-01-09 RX ADMIN — ERTAPENEM SODIUM 120 MILLIGRAM(S): 1 INJECTION, POWDER, LYOPHILIZED, FOR SOLUTION INTRAMUSCULAR; INTRAVENOUS at 15:40

## 2024-01-09 RX ADMIN — Medication 5 MILLIGRAM(S): at 21:56

## 2024-01-09 RX ADMIN — Medication 40 MILLIEQUIVALENT(S): at 00:29

## 2024-01-09 RX ADMIN — Medication 2.5 MILLIGRAM(S): at 07:28

## 2024-01-09 RX ADMIN — KETOTIFEN FUMARATE 1 DROP(S): 0.34 SOLUTION OPHTHALMIC at 14:17

## 2024-01-09 RX ADMIN — Medication 50 MILLIEQUIVALENT(S): at 05:44

## 2024-01-09 NOTE — H&P ADULT - ATTENDING COMMENTS
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) + TV annuloplasty  #CHB sp micra  #Suspected infected pericardial/pleural effusion (recent dc with IV ertapenem end date 4 weeks from 12/28  #Possible COPD exacerbation  CTA chest PE 01/09 reviewed; negative for PE, findings of CHF/pulm edema, bilateral areas of peritoneal nodularity vs complex loculated fluid  dw pulm and ID  - No intervention needed per pulm, had thoracentesis recently in December  - Start lasix 40 BID  - Monitor I/Os  - Cont IV ertapenem 1g q24h  - RDV and IV decadron 6mg qD  - cont metoprolol ER 50 qd  - f/u BCx  - Montitor K    #supratheupeutic INR  - hold warfarin  - Monitor INR qD, goal 2-3    #normocytic anemia  Hb 8.6  - iron studies   b12   folate  - active type and screen    #HTN  - cont valsartan and amlodipine  - DC home HCTZ in view of hypokalemia    #constipation  - cont miralax and bisacodyl    DVT PPX, supratherapeutic INR    #Progress Note Handoff  Pending (specify): Cont IV diuresis, Decadron/RDV  Family discussion: dw pt regarding tx for hf exacerbation and covid  Disposition: Home 84 yo F w PMH of AF on warfarin, rheumatic MV disease, 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) + TV annuloplasty  #CHB sp micra  #Suspected infected pericardial/pleural effusion (recent dc with IV ertapenem end date 4 weeks from 12/28  #Possible COPD exacerbation  CTA chest PE 01/09 reviewed; negative for PE, findings of CHF/pulm edema, bilateral areas of peritoneal nodularity vs complex loculated fluid  dw pulm and ID  - No intervention needed per pulm, had thoracentesis recently in December  - Start lasix 40 BID  - Monitor I/Os  - Cont IV ertapenem 1g q24h  - RDV and IV decadron 6mg qD  - cont metoprolol ER 50 qd  - f/u BCx  - Montitor K    #supratheupeutic INR  - hold warfarin  - Monitor INR qD, goal 2-3    #normocytic anemia  Hb 8.6  - iron studies   b12   folate  - active type and screen    #HTN  - cont valsartan and amlodipine  - DC home HCTZ in view of hypokalemia    #constipation  - cont miralax and bisacodyl    DVT PPX, supratherapeutic INR    #Progress Note Handoff  Pending (specify): Cont IV diuresis, Decadron/RDV  Family discussion: dw pt regarding tx for hf exacerbation and covid  Disposition: Home

## 2024-01-09 NOTE — CONSULT NOTE ADULT - ASSESSMENT
Rheumatic Heart Disease s/p MVR & TV Annuloplasty c/b CHB s/p pacemaker  HO ESBL Klebsiella bacteremia on ertapenem  COVID+     Rheumatic Heart Disease s/p MVR & TV Annuloplasty c/b CHB s/p pacemaker  HO ESBL Klebsiella bacteremia on ertapenem  COVID+    -     Rheumatic Heart Disease s/p MVR & TV Annuloplasty c/b CHB s/p pacemaker  HO ESBL Klebsiella bacteremia on ertapenem  COVID+  -tested positive for COVID on 1/5 at [facility]  -had been symptomatic (TEIXEIRA, weakness) for the past 2 weeks since her discharge from [facility], based on this presumed timeline she should be in the late cytokine release stage of COVID, however CT chest does not reflect that, it is more consistent with cardiogenic etiology (pleural effusions, GGO's)  -regardless, given the unclear timeline (unsure if symptoms from 2 weeks ago are COVID-related vs. cardiogenic) cannot be certain if she had COVID for 2 weeks or 4 days (from day of positive test) and hypoxemia, therefore would continue RDV and dexa    Recommendations  -continue RDV and dexamethasone 6 mg  -continue ertapenem 1 g IV q24h end date 4 weeks from 12/28  -f/u BCx   Rheumatic Heart Disease s/p MVR & TV Annuloplasty c/b CHB s/p pacemaker  HO ESBL Klebsiella bacteremia on ertapenem  COVID+  -tested positive for COVID on 1/5 at [facility]  -had been symptomatic (TEIXEIRA, weakness) for the past 2 weeks since her discharge from [facility], based on this presumed timeline she should be in the late cytokine release stage of COVID, however CT chest does not reflect that, it is more consistent with cardiogenic etiology (pleural effusions, GGO's)  -regardless, given the unclear timeline (unsure if symptoms from 2 weeks ago are COVID-related vs. cardiogenic) cannot be certain if she had COVID for 2 weeks or 4 days (from day of positive test) and hypoxemia, therefore would continue RDV and dexa    Recommendations  -continue RDV and dexamethasone 6 mg  -continue ertapenem 1 g IV q24h end date 4 weeks from 12/28  -f/u BCx   Rheumatic Heart Disease s/p MVR & TV Annuloplasty c/b CHB s/p pacemaker  HO ESBL Klebsiella bacteremia on ertapenem  COVID+  -tested positive for COVID on 1/5 at [facility]  -had been symptomatic (TEIXEIRA, weakness) for the past 2 weeks since her discharge from [facility], based on this presumed timeline she should be in the late cytokine release stage of COVID, however CT chest does not reflect that, it is more consistent with cardiogenic etiology (pleural effusions, GGO's)  -regardless, given the unclear timeline (unsure if symptoms from 2 weeks ago are COVID-related vs. cardiogenic) cannot be certain if she had COVID for 2 weeks or 4 days (from day of positive test) and hypoxemia, therefore would continue RDV and dexa    Recommendations  -continue RDV and dexamethasone 6 mg  -continue ertapenem 1 g IV q24h end date 4 weeks from 12/28  -f/u BCx    Case and plan discussed with Dr. Alvarez   Rheumatic Heart Disease s/p MVR & TV Annuloplasty c/b CHB s/p pacemaker  HO ESBL Klebsiella bacteremia on ertapenem  COVID+  -tested positive for COVID on 1/5 at [facility]  -had been symptomatic (TEIXEIRA, weakness) for the past 2 weeks since her discharge from [facility], based on this presumed timeline she should be in the late cytokine release stage of COVID, however CT chest does not reflect that, it is more consistent with cardiogenic etiology (pleural effusions, GGO's)  -regardless, given the unclear timeline (unsure if symptoms from 2 weeks ago are COVID-related vs. cardiogenic) cannot be certain if she had COVID for 2 weeks or 4 days (from day of positive test) and hypoxemia, therefore would continue RDV and dexa    Recommendations  -continue RDV and dexamethasone 6 mg  -continue ertapenem 1 g IV q24h end date 4 weeks from 12/28  -f/u BCx    Case and plan discussed with Dr. Alvarez   Rheumatic Heart Disease s/p MVR & TV Annuloplasty c/b CHB s/p pacemaker  HO ESBL Klebsiella bacteremia on ertapenem ( as per chart. I have not seen this document )  COVID+  -tested positive for COVID on 1/5 at [facility]  -had been symptomatic (TEIXEIRA, weakness) for the past 2 weeks since her discharge from [facility], based on this presumed timeline she should be in the late cytokine release stage of COVID, however CT chest does not reflect that, it is more consistent with cardiogenic etiology (pleural effusions, GGO's)  -regardless, given the unclear timeline (unsure if symptoms from 2 weeks ago are COVID-related vs. cardiogenic) cannot be certain if she had COVID for 2 weeks or 4 days (from day of positive test) and hypoxemia, therefore would continue RDV and dexa    Recommendations  -continue RDV for 5 days  and dexamethasone 6 mg iv q24h for 10 days. If discharged earlier then d/c steroids  -D/c Ertapenem  -start Meropenem 1 gm iv q8h for now and on discharge Ertapenem 1 gm iv q24h with  end date 4 weeks from 12/28 and to be f/u by ID at [facility]    Please do not hesitate to recall ID if any questions arise either through [employer] or through [employer] teams        Rheumatic Heart Disease s/p MVR & TV Annuloplasty c/b CHB s/p pacemaker  HO ESBL Klebsiella bacteremia on ertapenem ( as per chart. I have not seen this document )  COVID+  -tested positive for COVID on 1/5 at [facility]  -had been symptomatic (TEIXEIRA, weakness) for the past 2 weeks since her discharge from [facility], based on this presumed timeline she should be in the late cytokine release stage of COVID, however CT chest does not reflect that, it is more consistent with cardiogenic etiology (pleural effusions, GGO's)  -regardless, given the unclear timeline (unsure if symptoms from 2 weeks ago are COVID-related vs. cardiogenic) cannot be certain if she had COVID for 2 weeks or 4 days (from day of positive test) and hypoxemia, therefore would continue RDV and dexa    Recommendations  -continue RDV for 5 days  and dexamethasone 6 mg iv q24h for 10 days. If discharged earlier then d/c steroids  -D/c Ertapenem  -start Meropenem 1 gm iv q8h for now and on discharge Ertapenem 1 gm iv q24h with  end date 4 weeks from 12/28 and to be f/u by ID at [facility]    Please do not hesitate to recall ID if any questions arise either through [employer] or through [employer] teams

## 2024-01-09 NOTE — PHARMACOTHERAPY INTERVENTION NOTE - COMMENTS
Recommended switching from ertapenem to meropenem 1g IV q8h as per ID consult recommendations.    Ion Hazel, PharmD, [facility]  Clinical Pharmacy Specialist, Infectious Diseases  Tele-Antimicrobial Stewardship Program (Tele-ASP)  Tele-ASP Phone: [phone]  Recommended switching from ertapenem to meropenem 1g IV q8h as per ID consult recommendations.    Ion Hazel, PharmD, [facility]  Clinical Pharmacy Specialist, Infectious Diseases  Tele-Antimicrobial Stewardship Program (Tele-ASP)  Tele-ASP Phone: [phone]

## 2024-01-09 NOTE — CONSULT NOTE ADULT - SUBJECTIVE AND OBJECTIVE BOX
Patient is a 83y old  Female who presents with a chief complaint of     HPI:  82 yo F w PMH of AF on warfarin, rheumatic MV disease, severe MR and TR s/p bioprosthetic MV replacement and TV annuloplasty on 12/13/2023 at [facility], c/b CHB s/p Micra opn 12/17, R thoracentesis for pleural effusion on 12/18/2023, , readmission at [facility] for MDR/ESBL Klebsiella bacteremia, positive urine and sputum cultures, s/p repeat R thoracentesis , possible infected pericardial effusion, discharged on 12/28 with PICC line for 4 weeks of IV ertapenem.   Pt was sent to the ED today by PCP as her INR was 8. She was also hypoxic on ambulation to 80s. Pt and daughter reports overall generalized weakness, poor appetite since discharge. Reports SOB on exertion and leg swelling. Took lasix 40 mg x 2 for 2-3 days as instructed by cardiologist now back to 40 mg daily.   Denies any melena, blood in stool/urine, no chest pain, fevers, cough.   Also tested positive for COVID-19 on 1/5  Of note pt was satting ok on RA, but  desats to 86 while ambulating here.    In ED,    spo2 96 on RA  labs: Hb 8.6   k 2.5   alk phos 166   bnp 7k  co2 51 on ABG, compensated    covid+  CT chest:  Bilateral areas of peritoneal nodularity versus complex loculated fluid,   greatest at right anteromedial epicardial region measuring up to 5 x 2   cm. Findings could represent empyema, given report of outside diagnosis.   Consider less likely alternative possibilities of pleuraltumor versus   right middle lobe mass. Follow-up recommended.    Cardiomegaly. Small pericardial effusion. Interstitial edema. Diffuse   lung groundglass opacities, consistent with pulmonary edema in the   appropriate clinical setting. Findings compatible with CHF.    No pulmonary embolism.       (09 Jan 2024 05:13)      PAST MEDICAL & SURGICAL HISTORY:  HTN (hypertension)      Afib      Hypothyroid      FHx: spinal stenosis          SOCIAL HX:   Smoking     NO                    ETOH                            Other    FAMILY HISTORY:  No pertinent family history in first degree relatives    :  No known cardiovacular family hisotry     Review Of Systems:     All ROS are negative except per HPI       Allergies    No Known Allergies    Intolerances          PHYSICAL EXAM    ICU Vital Signs Last 24 Hrs  T(C): 36.5 (09 Jan 2024 00:28), Max: 36.6 (08 Jan 2024 16:59)  T(F): 97.7 (09 Jan 2024 00:28), Max: 97.8 (08 Jan 2024 16:59)  HR: 60 (09 Jan 2024 02:55) (60 - 71)  BP: 170/74 (09 Jan 2024 02:55) (148/67 - 180/75)  BP(mean): --  ABP: --  ABP(mean): --  RR: 19 (09 Jan 2024 02:55) (19 - 19)  SpO2: 99% (09 Jan 2024 02:55) (86% - 99%)    O2 Parameters below as of 09 Jan 2024 02:55  Patient On (Oxygen Delivery Method): nasal cannula  O2 Flow (L/min): 2          CONSTITUTIONAL:  NAD    ENT:   Airway patent,   Mouth with normal mucosa.         CARDIAC:   Normal rate,   Regular rhythm.        RESPIRATORY:   No wheezing  Bilateral crackles   Not tachypneic,  No use of accessory muscles    GASTROINTESTINAL:  Abdomen soft,   Non-tender,   No guarding,   + BS      NEUROLOGICAL:   Alert   No motor deficits.    SKIN:   Skin normal color for race,   No evidence of rash.              LABS:                          8.6    7.82  )-----------( 279      ( 08 Jan 2024 19:33 )             26.9                                               01-08    134<L>  |  91<L>  |  9<L>  ----------------------------<  114<H>  2.5<LL>   |  31  |  0.5<L>    Ca    8.2<L>      08 Jan 2024 19:33    TPro  6.0  /  Alb  3.3<L>  /  TBili  1.1  /  DBili  x   /  AST  29  /  ALT  21  /  AlkPhos  166<H>  01-08      PT/INR - ( 08 Jan 2024 19:33 )   PT: >40.00 sec;   INR: 7.79 ratio         PTT - ( 08 Jan 2024 19:33 )  PTT:61.8 sec                                       Urinalysis Basic - ( 08 Jan 2024 19:33 )    Color: x / Appearance: x / SG: x / pH: x  Gluc: 114 mg/dL / Ketone: x  / Bili: x / Urobili: x   Blood: x / Protein: x / Nitrite: x   Leuk Esterase: x / RBC: x / WBC x   Sq Epi: x / Non Sq Epi: x / Bacteria: x                                                  LIVER FUNCTIONS - ( 08 Jan 2024 19:33 )  Alb: 3.3 g/dL / Pro: 6.0 g/dL / ALK PHOS: 166 U/L / ALT: 21 U/L / AST: 29 U/L / GGT: x                                                                                                                                       X-Rays reviewed                                                                                     ECHO        MEDICATIONS  (STANDING):  amLODIPine   Tablet 5 milliGRAM(s) Oral daily  atorvastatin 80 milliGRAM(s) Oral at bedtime  bisacodyl 5 milliGRAM(s) Oral at bedtime  ertapenem  IVPB      ketotifen 0.025% Ophthalmic Solution 1 Drop(s) Both EYES daily  levothyroxine 25 MICROGram(s) Oral daily  metoprolol succinate ER 50 milliGRAM(s) Oral daily  pantoprazole    Tablet 40 milliGRAM(s) Oral before breakfast  polyethylene glycol 3350 17 Gram(s) Oral daily  remdesivir  IVPB   IV Intermittent   valsartan 160 milliGRAM(s) Oral daily    MEDICATIONS  (PRN):  albuterol    90 MICROgram(s) HFA Inhaler 2 Puff(s) Inhalation every 6 hours PRN for bronchospasm         Patient is a 83y old  Female who presents with a chief complaint of     HPI:  84 yo F w PMH of AF on warfarin, rheumatic MV disease, severe MR and TR s/p bioprosthetic MV replacement and TV annuloplasty on 12/13/2023 at [facility], c/b CHB s/p Micra opn 12/17, R thoracentesis for pleural effusion on 12/18/2023, , readmission at [facility] for MDR/ESBL Klebsiella bacteremia, positive urine and sputum cultures, s/p repeat R thoracentesis , possible infected pericardial effusion, discharged on 12/28 with PICC line for 4 weeks of IV ertapenem.   Pt was sent to the ED today by PCP as her INR was 8. She was also hypoxic on ambulation to 80s. Pt and daughter reports overall generalized weakness, poor appetite since discharge. Reports SOB on exertion and leg swelling. Took lasix 40 mg x 2 for 2-3 days as instructed by cardiologist now back to 40 mg daily.   Denies any melena, blood in stool/urine, no chest pain, fevers, cough.   Also tested positive for COVID-19 on 1/5  Of note pt was satting ok on RA, but  desats to 86 while ambulating here.    In ED,    spo2 96 on RA  labs: Hb 8.6   k 2.5   alk phos 166   bnp 7k  co2 51 on ABG, compensated    covid+  CT chest:  Bilateral areas of peritoneal nodularity versus complex loculated fluid,   greatest at right anteromedial epicardial region measuring up to 5 x 2   cm. Findings could represent empyema, given report of outside diagnosis.   Consider less likely alternative possibilities of pleuraltumor versus   right middle lobe mass. Follow-up recommended.    Cardiomegaly. Small pericardial effusion. Interstitial edema. Diffuse   lung groundglass opacities, consistent with pulmonary edema in the   appropriate clinical setting. Findings compatible with CHF.    No pulmonary embolism.       (09 Jan 2024 05:13)      PAST MEDICAL & SURGICAL HISTORY:  HTN (hypertension)      Afib      Hypothyroid      FHx: spinal stenosis          SOCIAL HX:   Smoking     NO                    ETOH                            Other    FAMILY HISTORY:  No pertinent family history in first degree relatives    :  No known cardiovacular family hisotry     Review Of Systems:     All ROS are negative except per HPI       Allergies    No Known Allergies    Intolerances          PHYSICAL EXAM    ICU Vital Signs Last 24 Hrs  T(C): 36.5 (09 Jan 2024 00:28), Max: 36.6 (08 Jan 2024 16:59)  T(F): 97.7 (09 Jan 2024 00:28), Max: 97.8 (08 Jan 2024 16:59)  HR: 60 (09 Jan 2024 02:55) (60 - 71)  BP: 170/74 (09 Jan 2024 02:55) (148/67 - 180/75)  BP(mean): --  ABP: --  ABP(mean): --  RR: 19 (09 Jan 2024 02:55) (19 - 19)  SpO2: 99% (09 Jan 2024 02:55) (86% - 99%)    O2 Parameters below as of 09 Jan 2024 02:55  Patient On (Oxygen Delivery Method): nasal cannula  O2 Flow (L/min): 2          CONSTITUTIONAL:  NAD    ENT:   Airway patent,   Mouth with normal mucosa.         CARDIAC:   Normal rate,   Regular rhythm.        RESPIRATORY:   No wheezing  Bilateral crackles   Not tachypneic,  No use of accessory muscles    GASTROINTESTINAL:  Abdomen soft,   Non-tender,   No guarding,   + BS      NEUROLOGICAL:   Alert   No motor deficits.    SKIN:   Skin normal color for race,   No evidence of rash.              LABS:                          8.6    7.82  )-----------( 279      ( 08 Jan 2024 19:33 )             26.9                                               01-08    134<L>  |  91<L>  |  9<L>  ----------------------------<  114<H>  2.5<LL>   |  31  |  0.5<L>    Ca    8.2<L>      08 Jan 2024 19:33    TPro  6.0  /  Alb  3.3<L>  /  TBili  1.1  /  DBili  x   /  AST  29  /  ALT  21  /  AlkPhos  166<H>  01-08      PT/INR - ( 08 Jan 2024 19:33 )   PT: >40.00 sec;   INR: 7.79 ratio         PTT - ( 08 Jan 2024 19:33 )  PTT:61.8 sec                                       Urinalysis Basic - ( 08 Jan 2024 19:33 )    Color: x / Appearance: x / SG: x / pH: x  Gluc: 114 mg/dL / Ketone: x  / Bili: x / Urobili: x   Blood: x / Protein: x / Nitrite: x   Leuk Esterase: x / RBC: x / WBC x   Sq Epi: x / Non Sq Epi: x / Bacteria: x                                                  LIVER FUNCTIONS - ( 08 Jan 2024 19:33 )  Alb: 3.3 g/dL / Pro: 6.0 g/dL / ALK PHOS: 166 U/L / ALT: 21 U/L / AST: 29 U/L / GGT: x                                                                                                                                       X-Rays reviewed                                                                                     ECHO        MEDICATIONS  (STANDING):  amLODIPine   Tablet 5 milliGRAM(s) Oral daily  atorvastatin 80 milliGRAM(s) Oral at bedtime  bisacodyl 5 milliGRAM(s) Oral at bedtime  ertapenem  IVPB      ketotifen 0.025% Ophthalmic Solution 1 Drop(s) Both EYES daily  levothyroxine 25 MICROGram(s) Oral daily  metoprolol succinate ER 50 milliGRAM(s) Oral daily  pantoprazole    Tablet 40 milliGRAM(s) Oral before breakfast  polyethylene glycol 3350 17 Gram(s) Oral daily  remdesivir  IVPB   IV Intermittent   valsartan 160 milliGRAM(s) Oral daily    MEDICATIONS  (PRN):  albuterol    90 MICROgram(s) HFA Inhaler 2 Puff(s) Inhalation every 6 hours PRN for bronchospasm

## 2024-01-09 NOTE — CONSULT NOTE ADULT - SUBJECTIVE AND OBJECTIVE BOX
SORAYA KING  83y  Female      Patient is a 83y old  Female who presents with a chief complaint of       PAST MEDICAL/SURGICAL HISTORY  PAST MEDICAL & SURGICAL HISTORY:  HTN (hypertension)      Afib      Hypothyroid      FHx: spinal stenosis          REVIEW OF SYSTEMS:  CONSTITUTIONAL: No fever, weight loss, or fatigue  EYES: No eye pain, visual disturbances, or discharge  ENMT:  No difficulty hearing, tinnitus, vertigo; No sinus or throat pain  NECK: No pain or stiffness  BREASTS: No pain, masses, or nipple discharge  RESPIRATORY: No cough, wheezing, chills or hemoptysis; No shortness of breath  CARDIOVASCULAR: No chest pain, palpitations, dizziness, or leg swelling  GASTROINTESTINAL: No abdominal or epigastric pain. No nausea, vomiting, or hematemesis; No diarrhea or constipation. No melena or hematochezia.  GENITOURINARY: No dysuria, frequency, hematuria, or incontinence  NEUROLOGICAL: No headaches, memory loss, loss of strength, numbness, or tremors  SKIN: No itching, burning, rashes, or lesions   LYMPH NODES: No enlarged glands  ENDOCRINE: No heat or cold intolerance; No hair loss  MUSCULOSKELETAL: No joint pain or swelling; No muscle, back, or extremity pain  PSYCHIATRIC: No depression, anxiety, mood swings, or difficulty sleeping  HEME/LYMPH: No easy bruising, or bleeding gums  ALLERY AND IMMUNOLOGIC: No hives or eczema    T(C): 36.4 (01-09-24 @ 08:38), Max: 36.6 (01-08-24 @ 16:59)  HR: 60 (01-09-24 @ 08:38) (60 - 71)  BP: 167/71 (01-09-24 @ 08:38) (148/67 - 180/75)  RR: 18 (01-09-24 @ 08:38) (18 - 19)  SpO2: 95% (01-09-24 @ 08:38) (86% - 99%)  Wt(kg): --Vital Signs Last 24 Hrs  T(C): 36.4 (09 Jan 2024 08:38), Max: 36.6 (08 Jan 2024 16:59)  T(F): 97.5 (09 Jan 2024 08:38), Max: 97.8 (08 Jan 2024 16:59)  HR: 60 (09 Jan 2024 08:38) (60 - 71)  BP: 167/71 (09 Jan 2024 08:38) (148/67 - 180/75)  BP(mean): --  RR: 18 (09 Jan 2024 08:38) (18 - 19)  SpO2: 95% (09 Jan 2024 08:38) (86% - 99%)    Parameters below as of 09 Jan 2024 08:38  Patient On (Oxygen Delivery Method): nasal cannula  O2 Flow (L/min): 4      PHYSICAL EXAM:  GENERAL: NAD, well-groomed, well-developed  HEAD:  Atraumatic, Normocephalic  EYES: EOMI, PERRLA, conjunctiva and sclera clear  ENMT: No tonsillar erythema, exudates, or enlargement; Moist mucous membranes, Good dentition, No lesions  NECK: Supple, No JVD, Normal thyroid  NERVOUS SYSTEM:  Alert & Oriented X3, Good concentration; Motor Strength 5/5 B/L upper and lower extremities; DTRs 2+ intact and symmetric  CHEST/LUNG: Clear to percussion bilaterally; No rales, rhonchi, wheezing, or rubs  HEART: Regular rate and rhythm; No murmurs, rubs, or gallops  ABDOMEN: Soft, Nontender, Nondistended; Bowel sounds present  EXTREMITIES:  2+ Peripheral Pulses, No clubbing, cyanosis, or edema  LYMPH: No lymphadenopathy noted  SKIN: No rashes or lesions    Consultant(s) Notes Reviewed:  [x ] YES  [ ] NO  Care Discussed with Consultants/Other Providers [ x] YES  [ ] NO    LABS:  CBC   01-08-24 @ 19:33  Hematcorit 26.9  Hemoglobin 8.6  Mean Cell Hemoglobin 27.0  Platelet Count-Automated 279  RBC Count 3.19  Red Cell Distrib Width 15.1  Wbc Count 7.82      BMP  01-08-24 @ 19:33  Anion Gap. Serum 12  Blood Urea Nitrogen,Serm 9  Calcium, Total Serum 8.2  Carbon Dioxide, Serum 31  Chloride, Serum 91  Creatinine, Serum 0.5  eGFR in  --  eGFR in Non Afican American --  Gloucose, serum 114  Potassium, Serum 2.5  Sodium, Serum 134                  CMP  01-08-24 @ 19:33  Gilda Aminotransferase(ALT/SGPT)21  Albumin, Serum 3.3  Alkaline Phosphatase, Serum 166  Anion Gap, Serum 12  Aspartate Aminotransferase (AST/SGOT)29  Bilirubin Total, Serum 1.1  Blood Urea Nitrogen, Serum 9  Calcium,Total Serum 8.2  Carbon Dioxide, Serum 31  Chloride, Serum 91  Creatinine, Serum 0.5  eGFR if  --  eGFR if Non African American --  Glucose, Serum 114  Potassium, Serum 2.5  Protein Total, Serum 6.0  Sodium, Serum 134                          PT/INR  PT/INR  01-08-24 @ 19:33  INR 7.79  Prothrombin Time Comment --  Prothrobin Time, Plasma>40.00      Amylase/Lipase            RADIOLOGY & ADDITIONAL TESTS:    Imaging Personally Reviewed:  [X] YES  [ ] NO SORAYA KING  83y  Female      Patient is a 83y old  Female who presents with a chief complaint of       PAST MEDICAL/SURGICAL HISTORY  PAST MEDICAL & SURGICAL HISTORY:  HTN (hypertension)      Afib      Hypothyroid      FHx: spinal stenosis          REVIEW OF SYSTEMS:  CONSTITUTIONAL: weakness  RS: TEIXEIRA  CVS: LE swelling  remainder of ROS unremarkable      T(C): 36.4 (01-09-24 @ 08:38), Max: 36.6 (01-08-24 @ 16:59)  HR: 60 (01-09-24 @ 08:38) (60 - 71)  BP: 167/71 (01-09-24 @ 08:38) (148/67 - 180/75)  RR: 18 (01-09-24 @ 08:38) (18 - 19)  SpO2: 95% (01-09-24 @ 08:38) (86% - 99%)  Wt(kg): --Vital Signs Last 24 Hrs  T(C): 36.4 (09 Jan 2024 08:38), Max: 36.6 (08 Jan 2024 16:59)  T(F): 97.5 (09 Jan 2024 08:38), Max: 97.8 (08 Jan 2024 16:59)  HR: 60 (09 Jan 2024 08:38) (60 - 71)  BP: 167/71 (09 Jan 2024 08:38) (148/67 - 180/75)  BP(mean): --  RR: 18 (09 Jan 2024 08:38) (18 - 19)  SpO2: 95% (09 Jan 2024 08:38) (86% - 99%)    Parameters below as of 09 Jan 2024 08:38  Patient On (Oxygen Delivery Method): nasal cannula  O2 Flow (L/min): 4      PHYSICAL EXAM:  Gen: NAD  HEENT: PERRL, EOMI, mouth clr, nose clr  Neck: no nodes, no JVD, thyroid nl  lungs: clr  hrt: s1 s2 rrr no murmur  abd: soft, NT/ND, no HS megaly  ext: no edema, no c/c  neuro: aa ox3, cn intact, can move all 4 ext    Consultant(s) Notes Reviewed:  [x ] YES  [ ] NO  Care Discussed with Consultants/Other Providers [ x] YES  [ ] NO    LABS:  CBC   01-08-24 @ 19:33  Hematcorit 26.9  Hemoglobin 8.6  Mean Cell Hemoglobin 27.0  Platelet Count-Automated 279  RBC Count 3.19  Red Cell Distrib Width 15.1  Wbc Count 7.82      BMP  01-08-24 @ 19:33  Anion Gap. Serum 12  Blood Urea Nitrogen,Serm 9  Calcium, Total Serum 8.2  Carbon Dioxide, Serum 31  Chloride, Serum 91  Creatinine, Serum 0.5  eGFR in  --  eGFR in Non Afican American --  Gloucose, serum 114  Potassium, Serum 2.5  Sodium, Serum 134                  CMP  01-08-24 @ 19:33  Gilda Aminotransferase(ALT/SGPT)21  Albumin, Serum 3.3  Alkaline Phosphatase, Serum 166  Anion Gap, Serum 12  Aspartate Aminotransferase (AST/SGOT)29  Bilirubin Total, Serum 1.1  Blood Urea Nitrogen, Serum 9  Calcium,Total Serum 8.2  Carbon Dioxide, Serum 31  Chloride, Serum 91  Creatinine, Serum 0.5  eGFR if  --  eGFR if Non African American --  Glucose, Serum 114  Potassium, Serum 2.5  Protein Total, Serum 6.0  Sodium, Serum 134                          PT/INR  PT/INR  01-08-24 @ 19:33  INR 7.79  Prothrombin Time Comment --  Prothrobin Time, Plasma>40.00      Amylase/Lipase            RADIOLOGY & ADDITIONAL TESTS:    Imaging Personally Reviewed:  [X] YES  [ ] NO

## 2024-01-09 NOTE — CONSULT NOTE ADULT - ASSESSMENT
IMPRESSION:    COVID 19 infection   Supra therapeutic INR  Bilateral pleural effusions likely volume overload.  SP Right thoracentesis in December ( per record )   HO Valvular HD SP repair at [facility] in [place]  HO ESBL Klebsiella bacteremia SP Ertapenem   HO AFiB     PLAN:    CNS:  No depressants     HEENT: Oral care    PULMONARY:  HOB @ 45 degrees.  Aspiration precautions.  Wean o2 as tolerated.  Albuterol RN.  Repeat CT chest as OP in 4 - 6 weeks.  No intervention from pulmonary standpoint at this point.      CARDIOVASCULAR:  Maximize cardiac therapy and volume status.  Rate control.  Negative balance as tolerated.  Cardiology follow up     GI: GI prophylaxis.  Feeding.  Bowel regimen     RENAL:  Follow up lytes.  Correct as needed    INFECTIOUS DISEASE: Follow up cultures.  ABX per ID.  repeat cultures      HEMATOLOGICAL:  DVT prophylaxis.  Monitor CBC and coags     ENDOCRINE:  Follow up FS.  Insulin protocol if needed.    MUSCULOSKELETAL:  OOB.  Off loading.  PT OT     DGTF          IMPRESSION:    COVID 19 infection   Supra therapeutic INR  Bilateral pleural effusions likely volume overload.  SP Right thoracentesis in December ( per record )   HO Valvular HD SP repair at [facility] in [place]  HO ESBL Klebsiella bacteremia SP Ertapenem   HO AFiB     PLAN:    CNS:  No depressants     HEENT: Oral care    PULMONARY:  HOB @ 45 degrees.  Aspiration precautions.  Wean o2 as tolerated.  Albuterol RN.  Repeat CT chest as OP in 4 - 6 weeks.  No intervention from pulmonary standpoint at this point.      CARDIOVASCULAR:  Maximize cardiac therapy and volume status.  Rate control.  Negative balance as tolerated.  Cardiology follow up     GI: GI prophylaxis.  Feeding.  Bowel regimen     RENAL:  Follow up lytes.  Correct as needed    INFECTIOUS DISEASE: Follow up cultures.  ABX per ID.  repeat cultures.  Dexa 6 mg daily     HEMATOLOGICAL:  DVT prophylaxis.  Monitor CBC and coags     ENDOCRINE:  Follow up FS.  Insulin protocol if needed.    MUSCULOSKELETAL:  OOB.  Off loading.  PT OT     DGTF

## 2024-01-09 NOTE — CONSULT NOTE ADULT - ATTENDING COMMENTS
Counseled patient's son in law Phi at thre bedside about diagnostic testing and treatment plan. All questions answered. Abnormal lab/radiographical/microbiological data reviewed.

## 2024-01-09 NOTE — CONSULT NOTE ADULT - ATTENDING COMMENTS
84 yo F w PMH of AF on warfarin, rheumatic MV disease, severe MR and TR s/p bioprosthetic MV replacement and TV annuloplasty on 12/13/2023 at [facility], c/b CHB s/p Micra opn 12/17, R thoracentesis for pleural effusion on 12/18/2023, readmission at [facility] for MDR/ESBL Klebsiella bacteremia, positive urine and sputum cultures, s/p repeat R thoracentesis , possible infected pericardial effusion, discharged on 12/28 with PICC line for 4 weeks of IV ertapenem. Pt was sent to the ED today by PCP as her INR was 8. She was also hypoxic on ambulation to 80s.   Cardiology consulted for troponin elevation and significant cardiac history.     BP elevated to 170-180s, saturating 95% on RA, has b/l 2+ pitting edema, dried scab at site of prior chest tube site infection, chest imaging with pulmonary edema, small pericardial effusion and pleural effusions. Also findings concerning for empyema.   , pBNP 7000, creatinine 0.5, K 2.5, INR 7.79 , Hb 8.6.     Impression:     -Acute hypoxic resp failure on 2 L NC 02 likely 2/2 volume overload  -Decompensated HFpEF   -Hypokalemia  -troponin elevation 2/2 demand ischemia   -supratherapeutic INR , no overt bleeding   -PersAF , CHB s/p micra on warfarin   -MR and TR s/p MVr and tricuspid annuloplasty on 12/13   -recent ESBL bacteremia     Recs:     -aggressive repletion of K with IV and PO KCL   -once K is repleted, give IV lasix 40 mg , assess UOP response, increase to q12h for goal net negative 1-2 L and edema improvement.   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 Pt was sent to the ED today by PCP as her INR was 8. She was also hypoxic on ambulation to 80s.   Cardiology consulted for troponin elevation and significant cardiac history.     BP elevated to 170-180s, saturating 95% on RA, has b/l 2+ pitting edema, dried scab at site of prior chest tube site infection, chest imaging with pulmonary edema, small pericardial effusion and pleural effusions. Also findings concerning for empyema.   , pBNP 7000, creatinine 0.5, K 2.5, INR 7.79 , Hb 8.6.     Impression:     -Acute hypoxic resp failure on 2 L NC 02 likely 2/2 volume overload  -Decompensated HFpEF   -Hypokalemia  -troponin elevation 2/2 demand ischemia   -supratherapeutic INR , no overt bleeding   -PersAF , CHB s/p micra on warfarin   -MR and TR s/p MVr and tricuspid annuloplasty on 12/13   -recent ESBL bacteremia     Recs:     -aggressive repletion of K with IV and PO KCL   -once K is repleted, give IV lasix 40 mg , assess UOP response, increase to q12h for goal net negative 1-2 L and edema improvement.   -agree with holding warfarin and giving PO vitamin K 5 mg x 1, goal INR 2-3   -TTE tomorrow to assess valves   -ID, thoracic surgery and pulmonary evaluation   -telemetry monitoring   - outpatient follow-up with [facility] cardiology

## 2024-01-09 NOTE — CONSULT NOTE ADULT - SUBJECTIVE AND OBJECTIVE BOX
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 Pt was sent to the ED today by PCP as her INR was 8. She was also hypoxic on ambulation to 80s. Pt and daughter reports overall generalized weakness, poor appetite since discharge. Reports SOB on exertion and leg swelling. Took lasix 40 mg x 2 for 2-3 days as instructed by cardiologist now back to 40 mg daily. Denies any melena, blood in stool/urine, no chest pain, fevers, cough.   Also tested positive for COVID-19.   PAST MEDICAL & SURGICAL HISTORY  HTN (hypertension)    Afib    Hypothyroid    FHx: spinal stenosis        FAMILY HISTORY:  FAMILY HISTORY:  No pertinent family history in first degree relatives      [ ] no pertinent family history of premature cardiovascular disease in first degree relatives.  Mother:   Father:   Siblings:     SOCIAL HISTORY:  []smoker  []Alcohol  []Drug    ALLERGIES:  No Known Allergies      MEDICATIONS:  MEDICATIONS  (STANDING):  potassium chloride  20 mEq/100 mL IVPB 20 milliEquivalent(s) IV Intermittent once    MEDICATIONS  (PRN):      HOME MEDICATIONS:  Home Medications:  aspirin 81 mg oral tablet, chewable: 1 tab(s) orally once a day (22 Jun 2020 09:12)  losartan-hydroCHLOROthiazide 100 mg-25 mg oral tablet: 1 tab(s) orally once a day (22 Jun 2020 09:12)  metoprolol succinate 25 mg oral tablet, extended release: 1 tab(s) orally once a day (22 Jun 2020 09:12)  omeprazole 20 mg oral delayed release capsule: 1 cap(s) orally once a day (22 Jun 2020 09:12)  rosuvastatin 20 mg oral tablet: 1 tab(s) orally once a day (at bedtime) (22 Jun 2020 09:12)  Synthroid 125 mcg (0.125 mg) oral tablet: 1 tab(s) orally once a day (22 Jun 2020 09:12)      VITALS:   T(F): 97.7 (01-09 @ 00:28), Max: 97.8 (01-08 @ 16:59)  HR: 60 (01-09 @ 02:55) (60 - 71)  BP: 170/74 (01-09 @ 02:55) (148/67 - 180/75)  BP(mean): --  RR: 19 (01-09 @ 02:55) (19 - 19)  SpO2: 99% (01-09 @ 02:55) (86% - 99%)    I&O's Summary      REVIEW OF SYSTEMS:    Negative except as mentioned in HPI    CONSTITUTIONAL: No weakness, fevers or chills  EYES: No visual changes  ENT: No vertigo or throat pain   NECK: No pain or stiffness  RESPIRATORY: No cough, wheezing, hemoptysis; No shortness of breath  CARDIOVASCULAR: No chest pain or palpitations  GASTROINTESTINAL: No abdominal or epigastric pain. No nausea, vomiting, or hematemesis; No diarrhea or constipation. No melena or hematochezia.  GENITOURINARY: No dysuria, frequency or hematuria  NEUROLOGICAL: No numbness or weakness  SKIN: No itching, no rashes  MSK: FROM x4    PHYSICAL EXAM:  NEURO: Awake , alert and oriented  GEN: Not in acute distress  NECK: No JVD  LUNGS: Clear to auscultation bilaterally   CARDIOVASCULAR: S1/S2 present, RRR , no murmurs or rubs, no carotid bruits,  + PP bilaterally  ABD: Soft, non-tender, non-distended, +BS  EXT: No LIONEL  SKIN: Intact    LABS:                        8.6    7.82  )-----------( 279      ( 08 Jan 2024 19:33 )             26.9     01-08    134<L>  |  91<L>  |  9<L>  ----------------------------<  114<H>  2.5<LL>   |  31  |  0.5<L>    Ca    8.2<L>      08 Jan 2024 19:33    TPro  6.0  /  Alb  3.3<L>  /  TBili  1.1  /  DBili  x   /  AST  29  /  ALT  21  /  AlkPhos  166<H>  01-08    PT/INR - ( 08 Jan 2024 19:33 )   PT: >40.00 sec;   INR: 7.79 ratio         PTT - ( 08 Jan 2024 19:33 )  PTT:61.8 sec          Troponin trend:          RADIOLOGY:  -Cat:  IMPRESSION:    Bilateral areas of peritoneal nodularity versus complex loculated fluid, greatest at right anteromedial epicardial region measuring up to 5 x 2 cm. Findings could represent empyema, given report of outside diagnosis. Consider less likely alternative possibilities of pleural tumor versus right middle lobe mass. Follow-up recommended.    Cardiomegaly. Small pericardial effusion. Interstitial edema. Diffuse lung groundglass opacities, consistent with pulmonary edema in the appropriate clinical setting. Findings compatible with CHF.    No pulmonary embolism.    -TTE: 10/4/2023:   Normal global left ventricular systolic function.  2. LV Ejection Fraction by Jones's Method with a biplane EF of 73 %.   3. The left ventricular diastolic function could not be assessed in this   study.   4. Mildly reduced RV systolic function.   5. Moderately enlarged right ventricle.   6. Mildlyenlarged left atrium.   7. Mildly enlarged right atrium.   8. Degenerative mitral valve.   9. Moderate thickening and calcification of the anterior and posterior   mitral valve leaflets.  10. Severe mitral annular calcification.  11. Moderate to severe mitral valve regurgitation.  12. Severe tricuspid regurgitation.  13. Aortic valve thickening with decreased leaflet opening. Mild aortic   stenosis, CHARLEEN 1.6 cm^2.  14. Estimated pulmonary artery systolic pressure is 86.6 mmHg assuming a   right atrial pressure of 15 mmHg, which is consistent with severe   pulmonary hypertension.  15. Compared to study in 2020, mitral regurgitation and PA pressure have   increased.    -CCTA:  -STRESS TEST:  -CATHETERIZATION:    ECG:  AF, paced QRS complexes.   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, c/b CHB s/p Micra opn 12/17, R thoracentesis for pleural effusion on 12/18/2023, , readmission at [facility] for MDR/ESBL Klebsiella bacteremia, positive urine and sputum cultures, s/p repeat R thoracentesis , possible infected pericardial effusion, discharged on 12/28 with PICC line for 4 weeks of IV ertapenem. Pt was sent to the ED today by PCP as her INR was 8. She was also hypoxic on ambulation to 80s. Pt and daughter reports overall generalized weakness, poor appetite since discharge. Reports SOB on exertion and leg swelling. Took lasix 40 mg x 2 for 2-3 days as instructed by cardiologist now back to 40 mg daily. Denies any melena, blood in stool/urine, no chest pain, fevers, cough.   Also tested positive for COVID-19.   PAST MEDICAL & SURGICAL HISTORY  HTN (hypertension)    Afib    Hypothyroid    FHx: spinal stenosis        FAMILY HISTORY:  FAMILY HISTORY:  No pertinent family history in first degree relatives      [ ] no pertinent family history of premature cardiovascular disease in first degree relatives.  Mother:   Father:   Siblings:     SOCIAL HISTORY:  []smoker  []Alcohol  []Drug    ALLERGIES:  No Known Allergies      MEDICATIONS:  MEDICATIONS  (STANDING):  potassium chloride  20 mEq/100 mL IVPB 20 milliEquivalent(s) IV Intermittent once    MEDICATIONS  (PRN):      HOME MEDICATIONS:  Home Medications:  aspirin 81 mg oral tablet, chewable: 1 tab(s) orally once a day (22 Jun 2020 09:12)  losartan-hydroCHLOROthiazide 100 mg-25 mg oral tablet: 1 tab(s) orally once a day (22 Jun 2020 09:12)  metoprolol succinate 25 mg oral tablet, extended release: 1 tab(s) orally once a day (22 Jun 2020 09:12)  omeprazole 20 mg oral delayed release capsule: 1 cap(s) orally once a day (22 Jun 2020 09:12)  rosuvastatin 20 mg oral tablet: 1 tab(s) orally once a day (at bedtime) (22 Jun 2020 09:12)  Synthroid 125 mcg (0.125 mg) oral tablet: 1 tab(s) orally once a day (22 Jun 2020 09:12)      VITALS:   T(F): 97.7 (01-09 @ 00:28), Max: 97.8 (01-08 @ 16:59)  HR: 60 (01-09 @ 02:55) (60 - 71)  BP: 170/74 (01-09 @ 02:55) (148/67 - 180/75)  BP(mean): --  RR: 19 (01-09 @ 02:55) (19 - 19)  SpO2: 99% (01-09 @ 02:55) (86% - 99%)    I&O's Summary      REVIEW OF SYSTEMS:    Negative except as mentioned in HPI    CONSTITUTIONAL: No weakness, fevers or chills  EYES: No visual changes  ENT: No vertigo or throat pain   NECK: No pain or stiffness  RESPIRATORY: No cough, wheezing, hemoptysis; No shortness of breath  CARDIOVASCULAR: No chest pain or palpitations  GASTROINTESTINAL: No abdominal or epigastric pain. No nausea, vomiting, or hematemesis; No diarrhea or constipation. No melena or hematochezia.  GENITOURINARY: No dysuria, frequency or hematuria  NEUROLOGICAL: No numbness or weakness  SKIN: No itching, no rashes  MSK: FROM x4    PHYSICAL EXAM:  NEURO: Awake , alert and oriented  GEN: Not in acute distress  NECK: No JVD  LUNGS: Clear to auscultation bilaterally   CARDIOVASCULAR: S1/S2 present, RRR , no murmurs or rubs, no carotid bruits,  + PP bilaterally  ABD: Soft, non-tender, non-distended, +BS  EXT: No LIONEL  SKIN: Intact    LABS:                        8.6    7.82  )-----------( 279      ( 08 Jan 2024 19:33 )             26.9     01-08    134<L>  |  91<L>  |  9<L>  ----------------------------<  114<H>  2.5<LL>   |  31  |  0.5<L>    Ca    8.2<L>      08 Jan 2024 19:33    TPro  6.0  /  Alb  3.3<L>  /  TBili  1.1  /  DBili  x   /  AST  29  /  ALT  21  /  AlkPhos  166<H>  01-08    PT/INR - ( 08 Jan 2024 19:33 )   PT: >40.00 sec;   INR: 7.79 ratio         PTT - ( 08 Jan 2024 19:33 )  PTT:61.8 sec          Troponin trend:          RADIOLOGY:  -Cat:  IMPRESSION:    Bilateral areas of peritoneal nodularity versus complex loculated fluid, greatest at right anteromedial epicardial region measuring up to 5 x 2 cm. Findings could represent empyema, given report of outside diagnosis. Consider less likely alternative possibilities of pleural tumor versus right middle lobe mass. Follow-up recommended.    Cardiomegaly. Small pericardial effusion. Interstitial edema. Diffuse lung groundglass opacities, consistent with pulmonary edema in the appropriate clinical setting. Findings compatible with CHF.    No pulmonary embolism.    -TTE: 10/4/2023:   Normal global left ventricular systolic function.  2. LV Ejection Fraction by Jones's Method with a biplane EF of 73 %.   3. The left ventricular diastolic function could not be assessed in this   study.   4. Mildly reduced RV systolic function.   5. Moderately enlarged right ventricle.   6. Mildlyenlarged left atrium.   7. Mildly enlarged right atrium.   8. Degenerative mitral valve.   9. Moderate thickening and calcification of the anterior and posterior   mitral valve leaflets.  10. Severe mitral annular calcification.  11. Moderate to severe mitral valve regurgitation.  12. Severe tricuspid regurgitation.  13. Aortic valve thickening with decreased leaflet opening. Mild aortic   stenosis, CHARLEEN 1.6 cm^2.  14. Estimated pulmonary artery systolic pressure is 86.6 mmHg assuming a   right atrial pressure of 15 mmHg, which is consistent with severe   pulmonary hypertension.  15. Compared to study in 2020, mitral regurgitation and PA pressure have   increased.    -CCTA:  -STRESS TEST:  -CATHETERIZATION:    ECG:  AF, paced QRS complexes.   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 for MDR/ESBL Klebsiella bacteremia, positive urine and sputum cultures, s/p repeat R thoracentesis , possible infected pericardial effusion, discharged on 12/28 with PICC line for 4 weeks of IV ertapenem. Pt was sent to the ED today by PCP as her INR was 8. She was also hypoxic on ambulation to 80s. Pt and daughter reports overall generalized weakness, poor appetite since discharge. Reports SOB on exertion and leg swelling. Took lasix 40 mg x 2 for 2-3 days as instructed by cardiologist now back to 40 mg daily. Denies any melena, blood in stool/urine, no chest pain, fevers, cough.   Also tested positive for COVID-19.   PAST MEDICAL & SURGICAL HISTORY  HTN (hypertension)    Afib    Hypothyroid    FHx: spinal stenosis        FAMILY HISTORY:  FAMILY HISTORY:  No pertinent family history in first degree relatives      [ ] no pertinent family history of premature cardiovascular disease in first degree relatives.  Mother:   Father:   Siblings:     SOCIAL HISTORY:    []smoker  []Alcohol  []Drug    ALLERGIES:  No Known Allergies      MEDICATIONS:  MEDICATIONS  (STANDING):  potassium chloride  20 mEq/100 mL IVPB 20 milliEquivalent(s) IV Intermittent once    MEDICATIONS  (PRN):      HOME MEDICATIONS:  Home Medications:  aspirin 81 mg oral tablet, chewable: 1 tab(s) orally once a day (22 Jun 2020 09:12)  losartan-hydroCHLOROthiazide 100 mg-25 mg oral tablet: 1 tab(s) orally once a day (22 Jun 2020 09:12)  metoprolol succinate 25 mg oral tablet, extended release: 1 tab(s) orally once a day (22 Jun 2020 09:12)  omeprazole 20 mg oral delayed release capsule: 1 cap(s) orally once a day (22 Jun 2020 09:12)  rosuvastatin 20 mg oral tablet: 1 tab(s) orally once a day (at bedtime) (22 Jun 2020 09:12)  Synthroid 125 mcg (0.125 mg) oral tablet: 1 tab(s) orally once a day (22 Jun 2020 09:12)      VITALS:   T(F): 97.7 (01-09 @ 00:28), Max: 97.8 (01-08 @ 16:59)  HR: 60 (01-09 @ 02:55) (60 - 71)  BP: 170/74 (01-09 @ 02:55) (148/67 - 180/75)  BP(mean): --  RR: 19 (01-09 @ 02:55) (19 - 19)  SpO2: 99% (01-09 @ 02:55) (86% - 99%)    I&O's Summary      REVIEW OF SYSTEMS:    Negative except as mentioned in HPI    CONSTITUTIONAL: No weakness, fevers or chills  EYES: No visual changes  ENT: No vertigo or throat pain   NECK: No pain or stiffness  RESPIRATORY: No cough, wheezing, hemoptysis; No shortness of breath  CARDIOVASCULAR: No chest pain or palpitations  GASTROINTESTINAL: No abdominal or epigastric pain. No nausea, vomiting, or hematemesis; No diarrhea or constipation. No melena or hematochezia.  GENITOURINARY: No dysuria, frequency or hematuria  NEUROLOGICAL: No numbness or weakness  SKIN: No itching, no rashes  MSK: FROM x4    PHYSICAL EXAM:  NEURO: Awake , alert and oriented  GEN: Not in acute distress  NECK: No JVD  LUNGS: Clear to auscultation bilaterally   CARDIOVASCULAR: S1/S2 present, irreg ,2/6 systolic murmur, no rubs, no carotid bruits,  + PP bilaterally  ABD: Soft, non-tender, non-distended, +BS  EXT: No LIONEL  SKIN: Intact    LABS:                        8.6    7.82  )-----------( 279      ( 08 Jan 2024 19:33 )             26.9     01-08    134<L>  |  91<L>  |  9<L>  ----------------------------<  114<H>  2.5<LL>   |  31  |  0.5<L>    Ca    8.2<L>      08 Jan 2024 19:33    TPro  6.0  /  Alb  3.3<L>  /  TBili  1.1  /  DBili  x   /  AST  29  /  ALT  21  /  AlkPhos  166<H>  01-08    PT/INR - ( 08 Jan 2024 19:33 )   PT: >40.00 sec;   INR: 7.79 ratio         PTT - ( 08 Jan 2024 19:33 )  PTT:61.8 sec          Troponin trend:          RADIOLOGY:  -Cat:  IMPRESSION:    Bilateral areas of peritoneal nodularity versus complex loculated fluid, greatest at right anteromedial epicardial region measuring up to 5 x 2 cm. Findings could represent empyema, given report of outside diagnosis. Consider less likely alternative possibilities of pleural tumor versus right middle lobe mass. Follow-up recommended.    Cardiomegaly. Small pericardial effusion. Interstitial edema. Diffuse lung groundglass opacities, consistent with pulmonary edema in the appropriate clinical setting. Findings compatible with CHF.    No pulmonary embolism.    -TTE: 10/4/2023:   Normal global left ventricular systolic function.  2. LV Ejection Fraction by Jones's Method with a biplane EF of 73 %.   3. The left ventricular diastolic function could not be assessed in this   study.   4. Mildly reduced RV systolic function.   5. Moderately enlarged right ventricle.   6. Mildlyenlarged left atrium.   7. Mildly enlarged right atrium.   8. Degenerative mitral valve.   9. Moderate thickening and calcification of the anterior and posterior   mitral valve leaflets.  10. Severe mitral annular calcification.  11. Moderate to severe mitral valve regurgitation.  12. Severe tricuspid regurgitation.  13. Aortic valve thickening with decreased leaflet opening. Mild aortic   stenosis, CHARLEEN 1.6 cm^2.  14. Estimated pulmonary artery systolic pressure is 86.6 mmHg assuming a   right atrial pressure of 15 mmHg, which is consistent with severe   pulmonary hypertension.  15. Compared to study in 2020, mitral regurgitation and PA pressure have   increased.    -CCTA:  -STRESS TEST:  -CATHETERIZATION:    ECG:  AF, paced QRS complexes.   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, c/b CHB s/p Micra opn 12/17, R thoracentesis for pleural effusion on 12/18/2023, , readmission at [facility] for MDR/ESBL Klebsiella bacteremia, positive urine and sputum cultures, s/p repeat R thoracentesis , possible infected pericardial effusion, discharged on 12/28 with PICC line for 4 weeks of IV ertapenem. Pt was sent to the ED today by PCP as her INR was 8. She was also hypoxic on ambulation to 80s. Pt and daughter reports overall generalized weakness, poor appetite since discharge. Reports SOB on exertion and leg swelling. Took lasix 40 mg x 2 for 2-3 days as instructed by cardiologist now back to 40 mg daily. Denies any melena, blood in stool/urine, no chest pain, fevers, cough.   Also tested positive for COVID-19.   PAST MEDICAL & SURGICAL HISTORY  HTN (hypertension)    Afib    Hypothyroid    FHx: spinal stenosis        FAMILY HISTORY:  FAMILY HISTORY:  No pertinent family history in first degree relatives      [ ] no pertinent family history of premature cardiovascular disease in first degree relatives.  Mother:   Father:   Siblings:     SOCIAL HISTORY:    []smoker  []Alcohol  []Drug    ALLERGIES:  No Known Allergies      MEDICATIONS:  MEDICATIONS  (STANDING):  potassium chloride  20 mEq/100 mL IVPB 20 milliEquivalent(s) IV Intermittent once    MEDICATIONS  (PRN):      HOME MEDICATIONS:  Home Medications:  aspirin 81 mg oral tablet, chewable: 1 tab(s) orally once a day (22 Jun 2020 09:12)  losartan-hydroCHLOROthiazide 100 mg-25 mg oral tablet: 1 tab(s) orally once a day (22 Jun 2020 09:12)  metoprolol succinate 25 mg oral tablet, extended release: 1 tab(s) orally once a day (22 Jun 2020 09:12)  omeprazole 20 mg oral delayed release capsule: 1 cap(s) orally once a day (22 Jun 2020 09:12)  rosuvastatin 20 mg oral tablet: 1 tab(s) orally once a day (at bedtime) (22 Jun 2020 09:12)  Synthroid 125 mcg (0.125 mg) oral tablet: 1 tab(s) orally once a day (22 Jun 2020 09:12)      VITALS:   T(F): 97.7 (01-09 @ 00:28), Max: 97.8 (01-08 @ 16:59)  HR: 60 (01-09 @ 02:55) (60 - 71)  BP: 170/74 (01-09 @ 02:55) (148/67 - 180/75)  BP(mean): --  RR: 19 (01-09 @ 02:55) (19 - 19)  SpO2: 99% (01-09 @ 02:55) (86% - 99%)    I&O's Summary      REVIEW OF SYSTEMS:    Negative except as mentioned in HPI    CONSTITUTIONAL: No weakness, fevers or chills  EYES: No visual changes  ENT: No vertigo or throat pain   NECK: No pain or stiffness  RESPIRATORY: No cough, wheezing, hemoptysis; No shortness of breath  CARDIOVASCULAR: No chest pain or palpitations  GASTROINTESTINAL: No abdominal or epigastric pain. No nausea, vomiting, or hematemesis; No diarrhea or constipation. No melena or hematochezia.  GENITOURINARY: No dysuria, frequency or hematuria  NEUROLOGICAL: No numbness or weakness  SKIN: No itching, no rashes  MSK: FROM x4    PHYSICAL EXAM:  NEURO: Awake , alert and oriented  GEN: Not in acute distress  NECK: No JVD  LUNGS: Clear to auscultation bilaterally   CARDIOVASCULAR: S1/S2 present, irreg ,2/6 systolic murmur, no rubs, no carotid bruits,  + PP bilaterally  ABD: Soft, non-tender, non-distended, +BS  EXT: No LIONEL  SKIN: Intact    LABS:                        8.6    7.82  )-----------( 279      ( 08 Jan 2024 19:33 )             26.9     01-08    134<L>  |  91<L>  |  9<L>  ----------------------------<  114<H>  2.5<LL>   |  31  |  0.5<L>    Ca    8.2<L>      08 Jan 2024 19:33    TPro  6.0  /  Alb  3.3<L>  /  TBili  1.1  /  DBili  x   /  AST  29  /  ALT  21  /  AlkPhos  166<H>  01-08    PT/INR - ( 08 Jan 2024 19:33 )   PT: >40.00 sec;   INR: 7.79 ratio         PTT - ( 08 Jan 2024 19:33 )  PTT:61.8 sec          Troponin trend:          RADIOLOGY:  -Cat:  IMPRESSION:    Bilateral areas of peritoneal nodularity versus complex loculated fluid, greatest at right anteromedial epicardial region measuring up to 5 x 2 cm. Findings could represent empyema, given report of outside diagnosis. Consider less likely alternative possibilities of pleural tumor versus right middle lobe mass. Follow-up recommended.    Cardiomegaly. Small pericardial effusion. Interstitial edema. Diffuse lung groundglass opacities, consistent with pulmonary edema in the appropriate clinical setting. Findings compatible with CHF.    No pulmonary embolism.    -TTE: 10/4/2023:   Normal global left ventricular systolic function.  2. LV Ejection Fraction by Jones's Method with a biplane EF of 73 %.   3. The left ventricular diastolic function could not be assessed in this   study.   4. Mildly reduced RV systolic function.   5. Moderately enlarged right ventricle.   6. Mildlyenlarged left atrium.   7. Mildly enlarged right atrium.   8. Degenerative mitral valve.   9. Moderate thickening and calcification of the anterior and posterior   mitral valve leaflets.  10. Severe mitral annular calcification.  11. Moderate to severe mitral valve regurgitation.  12. Severe tricuspid regurgitation.  13. Aortic valve thickening with decreased leaflet opening. Mild aortic   stenosis, CHARLEEN 1.6 cm^2.  14. Estimated pulmonary artery systolic pressure is 86.6 mmHg assuming a   right atrial pressure of 15 mmHg, which is consistent with severe   pulmonary hypertension.  15. Compared to study in 2020, mitral regurgitation and PA pressure have   increased.    -CCTA:  -STRESS TEST:  -CATHETERIZATION:    ECG:  AF, paced QRS complexes.   TELEMETRY EVENTS:

## 2024-01-09 NOTE — H&P ADULT - HISTORY OF PRESENT ILLNESS
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   Pt was sent to the ED today by PCP as her INR was 8. She was also hypoxic on ambulation to 80s. Pt and daughter reports overall generalized weakness, poor appetite since discharge. Reports SOB on exertion and leg swelling. Took lasix 40 mg x 2 for 2-3 days as instructed by cardiologist now back to 40 mg daily.   Denies any melena, blood in stool/urine, no chest pain, fevers, cough.   Also tested positive for COVID-19 on 1/5  Of note pt was satting ok on RA, but  desats to 86 while ambulating here.    In ED,    spo2 96 on RA  labs: Hb 8.6   k 2.5   alk phos 166   bnp 7k  co2 51 on ABG, compensated    covid+  CT chest:  Bilateral areas of peritoneal nodularity versus complex loculated fluid,   greatest at right anteromedial epicardial region measuring up to 5 x 2   cm. Findings could represent empyema, given report of outside diagnosis.   Consider less likely alternative possibilities of pleuraltumor versus   right middle lobe mass. Follow-up recommended.    Cardiomegaly. Small pericardial effusion. Interstitial edema. Diffuse   lung groundglass opacities, consistent with pulmonary edema in the   appropriate clinical setting. Findings compatible with CHF.    No pulmonary embolism.       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, c/b CHB s/p Micra opn 12/17, R thoracentesis for pleural effusion on 12/18/2023, , readmission at [facility] for MDR/ESBL Klebsiella bacteremia, positive urine and sputum cultures, s/p repeat R thoracentesis , possible infected pericardial effusion, discharged on 12/28 with PICC line for 4 weeks of IV ertapenem.   Pt was sent to the ED today by PCP as her INR was 8. She was also hypoxic on ambulation to 80s. Pt and daughter reports overall generalized weakness, poor appetite since discharge. Reports SOB on exertion and leg swelling. Took lasix 40 mg x 2 for 2-3 days as instructed by cardiologist now back to 40 mg daily.   Denies any melena, blood in stool/urine, no chest pain, fevers, cough.   Also tested positive for COVID-19 on 1/5  Of note pt was satting ok on RA, but  desats to 86 while ambulating here.    In ED,    spo2 96 on RA  labs: Hb 8.6   k 2.5   alk phos 166   bnp 7k  co2 51 on ABG, compensated    covid+  CT chest:  Bilateral areas of peritoneal nodularity versus complex loculated fluid,   greatest at right anteromedial epicardial region measuring up to 5 x 2   cm. Findings could represent empyema, given report of outside diagnosis.   Consider less likely alternative possibilities of pleuraltumor versus   right middle lobe mass. Follow-up recommended.    Cardiomegaly. Small pericardial effusion. Interstitial edema. Diffuse   lung groundglass opacities, consistent with pulmonary edema in the   appropriate clinical setting. Findings compatible with CHF.    No pulmonary embolism.

## 2024-01-09 NOTE — CONSULT NOTE ADULT - ASSESSMENT
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 Pt was sent to the ED today by PCP as her INR was 8. She was also hypoxic on ambulation to 80s.   Cardiology consulted for troponin elevation and significant cardiac history.     BP elevated to 170-180s, saturating 95% on RA, has b/l 2+ pitting edema, dried scab at site of prior chest tube site infection, chest imaging with pulmonary edema, small pericardial effusion and pleural effusions. Also findings concerning for empyema.   , pBNP 7000, creatinine 0.5, K 2.5, INR 7.79 , Hb 8.6.     Impression:   -Acute hypoxic resp failure on 2 L NC 02 likely 2/2 volume overload  -Decompensated HFpEF   -Hypokalemia  -troponin elevation 2/2 demand ischemia   -supratherapeutic INR , no overt bleeding   -PersAF , CHB s/p micra on warfarin   -MR and TR s/p MVr and tricuspid annuloplasty on 12/13   -recent ESBL bacteremia     Recs:   -aggressive repletion of K with IV and PO KCL   -once K is repleted, give IV lasix 40 mg , assess UOP response, increase to q12h for goal net negative 1-2 L and leg edema improves.   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 Pt was sent to the ED today by PCP as her INR was 8. She was also hypoxic on ambulation to 80s.   Cardiology consulted for troponin elevation and significant cardiac history.     BP elevated to 170-180s, saturating 95% on RA, has b/l 2+ pitting edema, dried scab at site of prior chest tube site infection, chest imaging with pulmonary edema, small pericardial effusion and pleural effusions. Also findings concerning for empyema.   , pBNP 7000, creatinine 0.5, K 2.5, INR 7.79 , Hb 8.6.     Impression:   -Acute hypoxic resp failure on 2 L NC 02 likely 2/2 volume overload  -Decompensated HFpEF   -Hypokalemia  -troponin elevation 2/2 demand ischemia   -supratherapeutic INR , no overt bleeding   -PersAF , CHB s/p micra on warfarin   -MR and TR s/p MVr and tricuspid annuloplasty on 12/13   -recent ESBL bacteremia     Recs:   -aggressive repletion of K with IV and PO KCL   -once K is repleted, give IV lasix 40 mg , assess UOP response, increase to q12h for goal net negative 1-2 L and leg edema improves.   -agree with holding warfarin and giving PO vitamin K , goal INR 2-3   -TTE tomorrow to assess valves   -ID and pulmonary evaluation   -telemetry monitoring

## 2024-01-09 NOTE — H&P ADULT - ASSESSMENT
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    #HFpEFE  #rheumatid heart dz sp recent MV replacement (bioprosthetic) + TV annuloplasty  #CHB sp micra  bnp 7k  CT chest showing bl GGO + small pericardial effusion  seen by cardio  - will hold lasix till K improves  - TTE in the am    #recent open heart surgery complicated by infected pleural effusion?  CT chest : empyema   vs   RML mass    vs   tumor  Bilateral areas of peritoneal nodularity versus complex loculated fluid,   greatest at right anteromedial epicardial region measuring up to 5 x 2   cm.  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    #HFpEFE  #rheumatid heart dz sp recent MV replacement (bioprosthetic) + TV annuloplasty  #CHB sp micra  bnp 7k  CT chest showing bl GGO + small pericardial effusion  seen by cardio  - will hold lasix till K improves  - TTE in the am    #recent open heart surgery complicated by infected pleural effusion?  CT chest : empyema   vs   RML mass    vs   tumor  Bilateral areas of peritoneal nodularity versus complex loculated fluid,   greatest at right anteromedial epicardial region measuring up to 5 x 2   cm.  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    medrec based on surescripts, pt does not know meds  pharmacy: [facility]; plz call Rx / daughter to confirm medrec in am    #HFpEFE  #rheumatid heart dz sp recent MV replacement (bioprosthetic) + TV annuloplasty  #CHB sp micra  bnp 7k  CT chest showing bl GGO + small pericardial effusion  seen by cardio  - will hold lasix till K improves  - TTE in the am  - cont metoprolol 50 qd    #recent open heart surgery complicated by infected pleural effusion?  CT chest : empyema   vs   RML mass    vs   tumor  Bilateral areas of peritoneal nodularity versus complex loculated fluid,   greatest at right anteromedial epicardial region measuring up to 5 x 2   cm.  - pulm cs  - ID Cs  - cont ertapenem (last dose 1/10 as per sure scripts)  - fu BCx  - fu procal    #covid  - RDV  - ID Cs    #supratheupeutic INR  INR 7.79  - hold warfarin  - will give vitamin K po 2.5 mg    #hypokalemia  hoding home lasix 40 and HCTZ till K improves    #normocytic anemia  Hb 8.6  - iron studies   b12   folate  - active type and screen    #HTN  - cont valsartan and amlodipine  - hold home HCTZ in view of hypokalemia    #constipation  - cont miralax and bisacodyl    #copd vs lupis  - cont albuterol prn    pt on tramadol 50 but unsure of frequency    dvt ppx:   GI ppx: protonix  diet: DASH  activity: AAT       84 yo F w PMH of AF on warfarin, rheumatic MV disease, 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    medrec based on surescripts, pt does not know meds  pharmacy: [facility]; plz call Rx / daughter to confirm medrec in am    #HFpEFE  #rheumatid heart dz sp recent MV replacement (bioprosthetic) + TV annuloplasty  #CHB sp micra  bnp 7k  CT chest showing bl GGO + small pericardial effusion  seen by cardio  - will hold lasix till K improves  - TTE in the am  - cont metoprolol 50 qd    #recent open heart surgery complicated by infected pleural effusion?  CT chest : empyema   vs   RML mass    vs   tumor  Bilateral areas of peritoneal nodularity versus complex loculated fluid,   greatest at right anteromedial epicardial region measuring up to 5 x 2   cm.  - pulm cs  - ID Cs  - cont ertapenem (last dose 1/10 as per sure scripts)  - fu BCx  - fu procal    #covid  - RDV  - ID Cs    #supratheupeutic INR  INR 7.79  - hold warfarin  - will give vitamin K po 2.5 mg    #hypokalemia  hoding home lasix 40 and HCTZ till K improves    #normocytic anemia  Hb 8.6  - iron studies   b12   folate  - active type and screen    #HTN  - cont valsartan and amlodipine  - hold home HCTZ in view of hypokalemia    #constipation  - cont miralax and bisacodyl    #copd vs lupis  - cont albuterol prn    pt on tramadol 50 but unsure of frequency    dvt ppx:   GI ppx: protonix  diet: DASH  activity: AAT

## 2024-01-10 DIAGNOSIS — U07.1 COVID-19: ICD-10-CM

## 2024-01-10 DIAGNOSIS — I10 ESSENTIAL (PRIMARY) HYPERTENSION: ICD-10-CM

## 2024-01-10 DIAGNOSIS — I48.20 CHRONIC ATRIAL FIBRILLATION, UNSPECIFIED: ICD-10-CM

## 2024-01-10 DIAGNOSIS — I09.9 RHEUMATIC HEART DISEASE, UNSPECIFIED: ICD-10-CM

## 2024-01-10 DIAGNOSIS — R78.81 BACTEREMIA: ICD-10-CM

## 2024-01-10 DIAGNOSIS — I50.33 ACUTE ON CHRONIC DIASTOLIC (CONGESTIVE) HEART FAILURE: ICD-10-CM

## 2024-01-10 DIAGNOSIS — J96.01 ACUTE RESPIRATORY FAILURE WITH HYPOXIA: ICD-10-CM

## 2024-01-10 LAB
ALBUMIN SERPL ELPH-MCNC: 3.4 G/DL — LOW (ref 3.5–5.2)
ALBUMIN SERPL ELPH-MCNC: 3.4 G/DL — LOW (ref 3.5–5.2)
ALP SERPL-CCNC: 161 U/L — HIGH (ref 30–115)
ALP SERPL-CCNC: 161 U/L — HIGH (ref 30–115)
ALT FLD-CCNC: 23 U/L — SIGNIFICANT CHANGE UP (ref 0–41)
ALT FLD-CCNC: 23 U/L — SIGNIFICANT CHANGE UP (ref 0–41)
ANION GAP SERPL CALC-SCNC: 14 MMOL/L — SIGNIFICANT CHANGE UP (ref 7–14)
ANION GAP SERPL CALC-SCNC: 14 MMOL/L — SIGNIFICANT CHANGE UP (ref 7–14)
ANION GAP SERPL CALC-SCNC: 16 MMOL/L — HIGH (ref 7–14)
ANION GAP SERPL CALC-SCNC: 16 MMOL/L — HIGH (ref 7–14)
AST SERPL-CCNC: 41 U/L — SIGNIFICANT CHANGE UP (ref 0–41)
AST SERPL-CCNC: 41 U/L — SIGNIFICANT CHANGE UP (ref 0–41)
BASOPHILS # BLD AUTO: 0.01 K/UL — SIGNIFICANT CHANGE UP (ref 0–0.2)
BASOPHILS # BLD AUTO: 0.01 K/UL — SIGNIFICANT CHANGE UP (ref 0–0.2)
BASOPHILS NFR BLD AUTO: 0.2 % — SIGNIFICANT CHANGE UP (ref 0–1)
BASOPHILS NFR BLD AUTO: 0.2 % — SIGNIFICANT CHANGE UP (ref 0–1)
BILIRUB SERPL-MCNC: 1.5 MG/DL — HIGH (ref 0.2–1.2)
BILIRUB SERPL-MCNC: 1.5 MG/DL — HIGH (ref 0.2–1.2)
BUN SERPL-MCNC: 12 MG/DL — SIGNIFICANT CHANGE UP (ref 10–20)
BUN SERPL-MCNC: 12 MG/DL — SIGNIFICANT CHANGE UP (ref 10–20)
BUN SERPL-MCNC: 15 MG/DL — SIGNIFICANT CHANGE UP (ref 10–20)
BUN SERPL-MCNC: 15 MG/DL — SIGNIFICANT CHANGE UP (ref 10–20)
CALCIUM SERPL-MCNC: 7.7 MG/DL — LOW (ref 8.4–10.5)
CALCIUM SERPL-MCNC: 7.7 MG/DL — LOW (ref 8.4–10.5)
CALCIUM SERPL-MCNC: 8 MG/DL — LOW (ref 8.4–10.4)
CALCIUM SERPL-MCNC: 8 MG/DL — LOW (ref 8.4–10.4)
CHLORIDE SERPL-SCNC: 94 MMOL/L — LOW (ref 98–110)
CO2 SERPL-SCNC: 26 MMOL/L — SIGNIFICANT CHANGE UP (ref 17–32)
CO2 SERPL-SCNC: 26 MMOL/L — SIGNIFICANT CHANGE UP (ref 17–32)
CO2 SERPL-SCNC: 31 MMOL/L — SIGNIFICANT CHANGE UP (ref 17–32)
CO2 SERPL-SCNC: 31 MMOL/L — SIGNIFICANT CHANGE UP (ref 17–32)
CREAT SERPL-MCNC: 0.5 MG/DL — LOW (ref 0.7–1.5)
EGFR: 93 ML/MIN/1.73M2 — SIGNIFICANT CHANGE UP
EOSINOPHIL # BLD AUTO: 0 K/UL — SIGNIFICANT CHANGE UP (ref 0–0.7)
EOSINOPHIL # BLD AUTO: 0 K/UL — SIGNIFICANT CHANGE UP (ref 0–0.7)
EOSINOPHIL NFR BLD AUTO: 0 % — SIGNIFICANT CHANGE UP (ref 0–8)
EOSINOPHIL NFR BLD AUTO: 0 % — SIGNIFICANT CHANGE UP (ref 0–8)
GLUCOSE SERPL-MCNC: 134 MG/DL — HIGH (ref 70–99)
GLUCOSE SERPL-MCNC: 134 MG/DL — HIGH (ref 70–99)
GLUCOSE SERPL-MCNC: 170 MG/DL — HIGH (ref 70–99)
GLUCOSE SERPL-MCNC: 170 MG/DL — HIGH (ref 70–99)
HCT VFR BLD CALC: 27.3 % — LOW (ref 37–47)
HCT VFR BLD CALC: 27.3 % — LOW (ref 37–47)
HGB BLD-MCNC: 8.6 G/DL — LOW (ref 12–16)
HGB BLD-MCNC: 8.6 G/DL — LOW (ref 12–16)
IMM GRANULOCYTES NFR BLD AUTO: 0.4 % — HIGH (ref 0.1–0.3)
IMM GRANULOCYTES NFR BLD AUTO: 0.4 % — HIGH (ref 0.1–0.3)
INR BLD: 2.25 RATIO — HIGH (ref 0.65–1.3)
INR BLD: 2.25 RATIO — HIGH (ref 0.65–1.3)
LYMPHOCYTES # BLD AUTO: 0.76 K/UL — LOW (ref 1.2–3.4)
LYMPHOCYTES # BLD AUTO: 0.76 K/UL — LOW (ref 1.2–3.4)
LYMPHOCYTES # BLD AUTO: 13.4 % — LOW (ref 20.5–51.1)
LYMPHOCYTES # BLD AUTO: 13.4 % — LOW (ref 20.5–51.1)
MAGNESIUM SERPL-MCNC: 1.1 MG/DL — LOW (ref 1.8–2.4)
MAGNESIUM SERPL-MCNC: 1.1 MG/DL — LOW (ref 1.8–2.4)
MAGNESIUM SERPL-MCNC: 1.7 MG/DL — LOW (ref 1.8–2.4)
MAGNESIUM SERPL-MCNC: 1.7 MG/DL — LOW (ref 1.8–2.4)
MCHC RBC-ENTMCNC: 27.5 PG — SIGNIFICANT CHANGE UP (ref 27–31)
MCHC RBC-ENTMCNC: 27.5 PG — SIGNIFICANT CHANGE UP (ref 27–31)
MCHC RBC-ENTMCNC: 31.5 G/DL — LOW (ref 32–37)
MCHC RBC-ENTMCNC: 31.5 G/DL — LOW (ref 32–37)
MCV RBC AUTO: 87.2 FL — SIGNIFICANT CHANGE UP (ref 81–99)
MCV RBC AUTO: 87.2 FL — SIGNIFICANT CHANGE UP (ref 81–99)
MONOCYTES # BLD AUTO: 0.37 K/UL — SIGNIFICANT CHANGE UP (ref 0.1–0.6)
MONOCYTES # BLD AUTO: 0.37 K/UL — SIGNIFICANT CHANGE UP (ref 0.1–0.6)
MONOCYTES NFR BLD AUTO: 6.5 % — SIGNIFICANT CHANGE UP (ref 1.7–9.3)
MONOCYTES NFR BLD AUTO: 6.5 % — SIGNIFICANT CHANGE UP (ref 1.7–9.3)
NEUTROPHILS # BLD AUTO: 4.5 K/UL — SIGNIFICANT CHANGE UP (ref 1.4–6.5)
NEUTROPHILS # BLD AUTO: 4.5 K/UL — SIGNIFICANT CHANGE UP (ref 1.4–6.5)
NEUTROPHILS NFR BLD AUTO: 79.5 % — HIGH (ref 42.2–75.2)
NEUTROPHILS NFR BLD AUTO: 79.5 % — HIGH (ref 42.2–75.2)
NRBC # BLD: 0 /100 WBCS — SIGNIFICANT CHANGE UP (ref 0–0)
NRBC # BLD: 0 /100 WBCS — SIGNIFICANT CHANGE UP (ref 0–0)
PLATELET # BLD AUTO: 213 K/UL — SIGNIFICANT CHANGE UP (ref 130–400)
PLATELET # BLD AUTO: 213 K/UL — SIGNIFICANT CHANGE UP (ref 130–400)
PMV BLD: 10.5 FL — HIGH (ref 7.4–10.4)
PMV BLD: 10.5 FL — HIGH (ref 7.4–10.4)
POTASSIUM SERPL-MCNC: 3.2 MMOL/L — LOW (ref 3.5–5)
POTASSIUM SERPL-MCNC: 3.2 MMOL/L — LOW (ref 3.5–5)
POTASSIUM SERPL-MCNC: 3.5 MMOL/L — SIGNIFICANT CHANGE UP (ref 3.5–5)
POTASSIUM SERPL-MCNC: 3.5 MMOL/L — SIGNIFICANT CHANGE UP (ref 3.5–5)
POTASSIUM SERPL-SCNC: 3.2 MMOL/L — LOW (ref 3.5–5)
POTASSIUM SERPL-SCNC: 3.2 MMOL/L — LOW (ref 3.5–5)
POTASSIUM SERPL-SCNC: 3.5 MMOL/L — SIGNIFICANT CHANGE UP (ref 3.5–5)
POTASSIUM SERPL-SCNC: 3.5 MMOL/L — SIGNIFICANT CHANGE UP (ref 3.5–5)
PROCALCITONIN SERPL-MCNC: 0.04 NG/ML — SIGNIFICANT CHANGE UP (ref 0.02–0.1)
PROCALCITONIN SERPL-MCNC: 0.04 NG/ML — SIGNIFICANT CHANGE UP (ref 0.02–0.1)
PROT SERPL-MCNC: 6.2 G/DL — SIGNIFICANT CHANGE UP (ref 6–8)
PROT SERPL-MCNC: 6.2 G/DL — SIGNIFICANT CHANGE UP (ref 6–8)
PROTHROM AB SERPL-ACNC: 25.9 SEC — HIGH (ref 9.95–12.87)
PROTHROM AB SERPL-ACNC: 25.9 SEC — HIGH (ref 9.95–12.87)
RBC # BLD: 3.13 M/UL — LOW (ref 4.2–5.4)
RBC # BLD: 3.13 M/UL — LOW (ref 4.2–5.4)
RBC # FLD: 15.6 % — HIGH (ref 11.5–14.5)
RBC # FLD: 15.6 % — HIGH (ref 11.5–14.5)
SODIUM SERPL-SCNC: 136 MMOL/L — SIGNIFICANT CHANGE UP (ref 135–146)
SODIUM SERPL-SCNC: 136 MMOL/L — SIGNIFICANT CHANGE UP (ref 135–146)
SODIUM SERPL-SCNC: 139 MMOL/L — SIGNIFICANT CHANGE UP (ref 135–146)
SODIUM SERPL-SCNC: 139 MMOL/L — SIGNIFICANT CHANGE UP (ref 135–146)
WBC # BLD: 5.66 K/UL — SIGNIFICANT CHANGE UP (ref 4.8–10.8)
WBC # BLD: 5.66 K/UL — SIGNIFICANT CHANGE UP (ref 4.8–10.8)
WBC # FLD AUTO: 5.66 K/UL — SIGNIFICANT CHANGE UP (ref 4.8–10.8)
WBC # FLD AUTO: 5.66 K/UL — SIGNIFICANT CHANGE UP (ref 4.8–10.8)

## 2024-01-10 PROCEDURE — 99233 SBSQ HOSP IP/OBS HIGH 50: CPT

## 2024-01-10 RX ORDER — WARFARIN SODIUM 2.5 MG/1
5 TABLET ORAL ONCE
Refills: 0 | Status: COMPLETED | OUTPATIENT
Start: 2024-01-10 | End: 2024-01-10

## 2024-01-10 RX ORDER — MAGNESIUM OXIDE 400 MG ORAL TABLET 241.3 MG
400 TABLET ORAL
Refills: 0 | Status: DISCONTINUED | OUTPATIENT
Start: 2024-01-10 | End: 2024-01-18

## 2024-01-10 RX ORDER — MAGNESIUM SULFATE 500 MG/ML
2 VIAL (ML) INJECTION ONCE
Refills: 0 | Status: COMPLETED | OUTPATIENT
Start: 2024-01-10 | End: 2024-01-10

## 2024-01-10 RX ORDER — POTASSIUM CHLORIDE 20 MEQ
20 PACKET (EA) ORAL ONCE
Refills: 0 | Status: COMPLETED | OUTPATIENT
Start: 2024-01-10 | End: 2024-01-10

## 2024-01-10 RX ORDER — POTASSIUM CHLORIDE 20 MEQ
20 PACKET (EA) ORAL
Refills: 0 | Status: COMPLETED | OUTPATIENT
Start: 2024-01-10 | End: 2024-01-10

## 2024-01-10 RX ADMIN — Medication 20 MILLIEQUIVALENT(S): at 14:17

## 2024-01-10 RX ADMIN — MEROPENEM 100 MILLIGRAM(S): 1 INJECTION INTRAVENOUS at 22:32

## 2024-01-10 RX ADMIN — VALSARTAN 160 MILLIGRAM(S): 80 TABLET ORAL at 05:32

## 2024-01-10 RX ADMIN — MEROPENEM 100 MILLIGRAM(S): 1 INJECTION INTRAVENOUS at 14:18

## 2024-01-10 RX ADMIN — Medication 40 MILLIGRAM(S): at 05:33

## 2024-01-10 RX ADMIN — Medication 5 MILLIGRAM(S): at 21:31

## 2024-01-10 RX ADMIN — Medication 20 MILLIEQUIVALENT(S): at 12:46

## 2024-01-10 RX ADMIN — REMDESIVIR 200 MILLIGRAM(S): 5 INJECTION INTRAVENOUS at 22:29

## 2024-01-10 RX ADMIN — MAGNESIUM OXIDE 400 MG ORAL TABLET 400 MILLIGRAM(S): 241.3 TABLET ORAL at 14:18

## 2024-01-10 RX ADMIN — Medication 25 GRAM(S): at 21:30

## 2024-01-10 RX ADMIN — WARFARIN SODIUM 5 MILLIGRAM(S): 2.5 TABLET ORAL at 21:31

## 2024-01-10 RX ADMIN — MAGNESIUM OXIDE 400 MG ORAL TABLET 400 MILLIGRAM(S): 241.3 TABLET ORAL at 21:31

## 2024-01-10 RX ADMIN — Medication 25 MICROGRAM(S): at 05:32

## 2024-01-10 RX ADMIN — ATORVASTATIN CALCIUM 80 MILLIGRAM(S): 80 TABLET, FILM COATED ORAL at 21:31

## 2024-01-10 RX ADMIN — Medication 25 GRAM(S): at 08:54

## 2024-01-10 RX ADMIN — Medication 40 MILLIGRAM(S): at 14:17

## 2024-01-10 RX ADMIN — PANTOPRAZOLE SODIUM 40 MILLIGRAM(S): 20 TABLET, DELAYED RELEASE ORAL at 06:03

## 2024-01-10 RX ADMIN — MEROPENEM 100 MILLIGRAM(S): 1 INJECTION INTRAVENOUS at 05:33

## 2024-01-10 RX ADMIN — Medication 50 MILLIEQUIVALENT(S): at 21:34

## 2024-01-10 RX ADMIN — AMLODIPINE BESYLATE 5 MILLIGRAM(S): 2.5 TABLET ORAL at 05:31

## 2024-01-10 RX ADMIN — Medication 6 MILLIGRAM(S): at 05:32

## 2024-01-10 RX ADMIN — Medication 50 MILLIGRAM(S): at 05:31

## 2024-01-10 NOTE — PROGRESS NOTE ADULT - SUBJECTIVE AND OBJECTIVE BOX
CC: 83F admit w/ Acute Respiratory Failure with Hypoxia    SUBJECTIVE / OVERNIGHT EVENTS:  No acute events overnight. Patient seen and evaluated at bedside. No cp. cough+. no hemoptysis. swelling in b/l LE improved    ROS:  no fever today    MEDICATIONS  (STANDING):  amLODIPine   Tablet 5 milliGRAM(s) Oral daily  atorvastatin 80 milliGRAM(s) Oral at bedtime  bisacodyl 5 milliGRAM(s) Oral at bedtime  dexAMETHasone  Injectable 6 milliGRAM(s) IV Push daily  furosemide   Injectable 40 milliGRAM(s) IV Push two times a day  ketotifen 0.025% Ophthalmic Solution 1 Drop(s) Both EYES daily  levothyroxine 25 MICROGram(s) Oral daily  magnesium oxide 400 milliGRAM(s) Oral three times a day with meals  magnesium sulfate  IVPB 2 Gram(s) IV Intermittent once  meropenem  IVPB 1000 milliGRAM(s) IV Intermittent every 8 hours  metoprolol succinate ER 50 milliGRAM(s) Oral daily  pantoprazole    Tablet 40 milliGRAM(s) Oral before breakfast  polyethylene glycol 3350 17 Gram(s) Oral daily  potassium chloride  20 mEq/100 mL IVPB 20 milliEquivalent(s) IV Intermittent once  remdesivir  IVPB   IV Intermittent   remdesivir  IVPB 100 milliGRAM(s) IV Intermittent every 24 hours  valsartan 160 milliGRAM(s) Oral daily  warfarin 5 milliGRAM(s) Oral once    MEDICATIONS  (PRN):  albuterol    90 MICROgram(s) HFA Inhaler 2 Puff(s) Inhalation every 6 hours PRN for bronchospasm    I&O's Summary  09 Jan 2024 07:01  -  10 Felipe 2024 07:00  --------------------------------------------------------  IN: 0 mL / OUT: 1100 mL / NET: -1100 mL    Physical Exam  Vital Signs Last 24 Hrs  T(C): 36.6 (10 Felipe 2024 16:23), Max: 37.2 (10 Felipe 2024 05:30)  T(F): 97.9 (10 Felipe 2024 16:23), Max: 98.9 (10 Felipe 2024 05:30)  HR: 61 (10 Felipe 2024 16:23) (61 - 62)  BP: 116/58 (10 Felipe 2024 16:23) (116/58 - 162/74)  RR: 18 (10 Felipe 2024 16:23) (18 - 18)  SpO2: 100% (10 Felipe 2024 16:23) (96% - 100%)    Parameters below as of 10 Felipe 2024 16:23  Patient On (Oxygen Delivery Method): room air    GENERAL: NAD  HEAD:  Atraumatic, Normocephalic  EYES: EOMI, PERRL, conjunctiva and sclera clear  NECK: Supple, trachea midline  Lung: normal work of breathing, crackles+  Cardiovascular: S1&S2+, rrr, no m/r/g appreciated  ABDOMEN: soft, nt  SKIN: warm and dry, no visible purulence in exposed areas, no pitting edema appreciated in b/l LE    LABS:                        8.6    5.66  )-----------( 213      ( 10 Felipe 2024 07:00 )             27.3     01-10    139  |  94<L>  |  15  ----------------------------<  170<H>  3.2<L>   |  31  |  0.5<L>    Ca    7.7<L>      10 Felipe 2024 17:06  Mg     1.7     01-10    TPro  6.2  /  Alb  3.4<L>  /  TBili  1.5<H>  /  DBili  x   /  AST  41  /  ALT  23  /  AlkPhos  161<H>  01-10    PT/INR - ( 10 Felipe 2024 13:29 )   PT: 25.90 sec;   INR: 2.25 ratio         PTT - ( 09 Jan 2024 11:28 )  PTT:61.4 sec      Urinalysis Basic - ( 10 Felipe 2024 17:06 )    Color: x / Appearance: x / SG: x / pH: x  Gluc: 170 mg/dL / Ketone: x  / Bili: x / Urobili: x   Blood: x / Protein: x / Nitrite: x   Leuk Esterase: x / RBC: x / WBC x   Sq Epi: x / Non Sq Epi: x / Bacteria: x        Culture - Blood (collected 08 Jan 2024 19:33)  Source: .Blood Blood-Peripheral  Preliminary Report (10 Felipe 2024 03:02):    No growth at 24 hours    Culture - Blood (collected 08 Jan 2024 19:33)  Source: .Blood Blood-Peripheral  Preliminary Report (10 Felipe 2024 03:02):    No growth at 24 hours        RADIOLOGY & ADDITIONAL TESTS:  Results Reviewed:   Imaging Personally Reviewed:  Electrocardiogram Personally Reviewed:    COORDINATION OF CARE:  Care Discussed with Consultants/Other Providers [Y/N]:  Prior or Outpatient Records Reviewed [Y/N]:

## 2024-01-10 NOTE — PROGRESS NOTE ADULT - ASSESSMENT
83F w/ Rheumatic  Heart Disease s/p bioMVR/Tricuspid Annuloplasty(Dec2023 @[facility]) c/b pleural effusion s/p thoracentesis, Pericardial effusion & MDR Klebsiella Bacteremia d/c on Ertapenem, Chronic Afib on Warfarin(goal INR2.5-3.5, per patient's cardiologist), CHB s/p Micra, Klebsiella bacteremia admit for Acute Respiratory Failure with Hypoxia 2/2 COVID19 & Acute on Chronic HFpEF    #Acute hypoxic Respiratory Failure  - ID consulted  - c/w Remdesivir & Dexamethasone    #COVID19  - treatment as noted above    #Acute on chronic HFpEF  - c/w Furosemide 40mg IV BID, may transition to daily dosing tomorrow  - c/w valsartan    #Rheumatic Heart Disease  - charted bioMVR & Tricuspid annuloplasty at [facility] Dec2023  - per patient, on warfarin with goal INR 2.5-3.5 per her surgeon & cardiologist?    #Bacteremia  - ID consulted  - c/w meropenem while inpatient  - CTA chest reviewed and noted anterior pleural nodularity vs effusion- per hx from chart review suspected 2/2 complications at [facility] w/ infection. continue treatment as noted above    #Chronic Afib  - c/w metoprolol for rate control  - can restart warfarin dosing as INR improved. plan for 5mg tonight, and thereafter 4mg d (dose reduced from 5mg as patient had elevated INR on admit)    #HTN  - c/w amlodipine, valsartan, furosemide    Prophylactic measure  - daily dosing of warfarin per INR (patient reports goal of her surgeon/cardiologist is 2.5-3.5)  - GOC- Full Code 83F w/ Rheumatic  Heart Disease s/p bioMVR/Tricuspid Annuloplasty(Dec2023 @[facility]) c/b pleural effusion s/p thoracentesis, Pericardial effusion & MDR Klebsiella Bacteremia d/c on Ertapenem, Chronic Afib on Warfarin(goal INR2.5-3.5, per patient's cardiologist), CHB s/p Micra, Klebsiella bacteremia admit for Acute Respiratory Failure with Hypoxia 2/2 COVID19 & Acute on Chronic HFpEF    #Acute hypoxic Respiratory Failure  - ID consulted  - c/w Remdesivir & Dexamethasone    #COVID19  - treatment as noted above    #Acute on chronic HFpEF  - c/w Furosemide 40mg IV BID, may transition to daily dosing tomorrow  - c/w valsartan    #Rheumatic Heart Disease  - charted bioMVR & Tricuspid annuloplasty at [facility] Dec2023  - per patient, on warfarin with goal INR 2.5-3.5 per her surgeon & cardiologist?    #Bacteremia  - ID consulted  - c/w meropenem while inpatient  - CTA chest reviewed and noted anterior pleural nodularity vs effusion- per hx from chart review suspected 2/2 complications at [facility] w/ infection. continue treatment as noted above    #Chronic Afib  - c/w metoprolol for rate control  - can restart warfarin dosing as INR improved. plan for 5mg tonight, and thereafter 4mg d (dose reduced from 5mg as patient had elevated INR on admit)    #HTN  - c/w amlodipine, valsartan, furosemide    Prophylactic measure  - daily dosing of warfarin per INR (patient reports goal of her surgeon/cardiologist is 2.5-3.5)  - GOC- Full Code

## 2024-01-10 NOTE — PATIENT PROFILE ADULT - FALL HARM RISK - HARM RISK INTERVENTIONS
Assistance with ambulation/Assistance OOB with selected safe patient handling equipment/Communicate Risk of Fall with Harm to all staff/Discuss with provider need for PT consult/Monitor gait and stability/Provide patient with walking aids - walker, cane, crutches/Reinforce activity limits and safety measures with patient and family/Tailored Fall Risk Interventions/Visual Cue: Yellow wristband and red socks/Bed in lowest position, wheels locked, appropriate side rails in place/Call bell, personal items and telephone in reach/Instruct patient to call for assistance before getting out of bed or chair/Non-slip footwear when patient is out of bed/[place] to call system/Physically safe environment - no spills, clutter or unnecessary equipment/Purposeful Proactive Rounding/Room/bathroom lighting operational, light cord in reach Assistance with ambulation/Assistance OOB with selected safe patient handling equipment/Communicate Risk of Fall with Harm to all staff/Discuss with provider need for PT consult/Monitor gait and stability/Provide patient with walking aids - walker, cane, crutches/Reinforce activity limits and safety measures with patient and family/Tailored Fall Risk Interventions/Visual Cue: Yellow wristband and red socks/Bed in lowest position, wheels locked, appropriate side rails in place/Call bell, personal items and telephone in reach/Instruct patient to call for assistance before getting out of bed or chair/Non-slip footwear when patient is out of bed/[place] to call system/Physically safe environment - no spills, clutter or unnecessary equipment/Purposeful Proactive Rounding/Room/bathroom lighting operational, light cord in reach

## 2024-01-11 LAB
ALBUMIN SERPL ELPH-MCNC: 3.4 G/DL — LOW (ref 3.5–5.2)
ALBUMIN SERPL ELPH-MCNC: 3.4 G/DL — LOW (ref 3.5–5.2)
ALP SERPL-CCNC: 164 U/L — HIGH (ref 30–115)
ALP SERPL-CCNC: 164 U/L — HIGH (ref 30–115)
ALT FLD-CCNC: 27 U/L — SIGNIFICANT CHANGE UP (ref 0–41)
ALT FLD-CCNC: 27 U/L — SIGNIFICANT CHANGE UP (ref 0–41)
ANION GAP SERPL CALC-SCNC: 12 MMOL/L — SIGNIFICANT CHANGE UP (ref 7–14)
ANION GAP SERPL CALC-SCNC: 12 MMOL/L — SIGNIFICANT CHANGE UP (ref 7–14)
APTT BLD: 38.4 SEC — SIGNIFICANT CHANGE UP (ref 27–39.2)
APTT BLD: 38.4 SEC — SIGNIFICANT CHANGE UP (ref 27–39.2)
AST SERPL-CCNC: 48 U/L — HIGH (ref 0–41)
AST SERPL-CCNC: 48 U/L — HIGH (ref 0–41)
BASOPHILS # BLD AUTO: 0.01 K/UL — SIGNIFICANT CHANGE UP (ref 0–0.2)
BASOPHILS # BLD AUTO: 0.01 K/UL — SIGNIFICANT CHANGE UP (ref 0–0.2)
BASOPHILS NFR BLD AUTO: 0.1 % — SIGNIFICANT CHANGE UP (ref 0–1)
BASOPHILS NFR BLD AUTO: 0.1 % — SIGNIFICANT CHANGE UP (ref 0–1)
BILIRUB DIRECT SERPL-MCNC: 0.5 MG/DL — HIGH (ref 0–0.3)
BILIRUB DIRECT SERPL-MCNC: 0.5 MG/DL — HIGH (ref 0–0.3)
BILIRUB INDIRECT FLD-MCNC: 0.8 MG/DL — SIGNIFICANT CHANGE UP (ref 0.2–1.2)
BILIRUB INDIRECT FLD-MCNC: 0.8 MG/DL — SIGNIFICANT CHANGE UP (ref 0.2–1.2)
BILIRUB SERPL-MCNC: 1.3 MG/DL — HIGH (ref 0.2–1.2)
BILIRUB SERPL-MCNC: 1.3 MG/DL — HIGH (ref 0.2–1.2)
BUN SERPL-MCNC: 18 MG/DL — SIGNIFICANT CHANGE UP (ref 10–20)
BUN SERPL-MCNC: 18 MG/DL — SIGNIFICANT CHANGE UP (ref 10–20)
CALCIUM SERPL-MCNC: 8.1 MG/DL — LOW (ref 8.4–10.5)
CALCIUM SERPL-MCNC: 8.1 MG/DL — LOW (ref 8.4–10.5)
CHLORIDE SERPL-SCNC: 95 MMOL/L — LOW (ref 98–110)
CHLORIDE SERPL-SCNC: 95 MMOL/L — LOW (ref 98–110)
CO2 SERPL-SCNC: 31 MMOL/L — SIGNIFICANT CHANGE UP (ref 17–32)
CO2 SERPL-SCNC: 31 MMOL/L — SIGNIFICANT CHANGE UP (ref 17–32)
CREAT SERPL-MCNC: 0.5 MG/DL — LOW (ref 0.7–1.5)
CREAT SERPL-MCNC: 0.5 MG/DL — LOW (ref 0.7–1.5)
EGFR: 93 ML/MIN/1.73M2 — SIGNIFICANT CHANGE UP
EGFR: 93 ML/MIN/1.73M2 — SIGNIFICANT CHANGE UP
EOSINOPHIL # BLD AUTO: 0 K/UL — SIGNIFICANT CHANGE UP (ref 0–0.7)
EOSINOPHIL # BLD AUTO: 0 K/UL — SIGNIFICANT CHANGE UP (ref 0–0.7)
EOSINOPHIL NFR BLD AUTO: 0 % — SIGNIFICANT CHANGE UP (ref 0–8)
EOSINOPHIL NFR BLD AUTO: 0 % — SIGNIFICANT CHANGE UP (ref 0–8)
FERRITIN SERPL-MCNC: 412 NG/ML — HIGH (ref 13–330)
FERRITIN SERPL-MCNC: 412 NG/ML — HIGH (ref 13–330)
FOLATE SERPL-MCNC: 19.7 NG/ML — SIGNIFICANT CHANGE UP
FOLATE SERPL-MCNC: 19.7 NG/ML — SIGNIFICANT CHANGE UP
GLUCOSE SERPL-MCNC: 127 MG/DL — HIGH (ref 70–99)
GLUCOSE SERPL-MCNC: 127 MG/DL — HIGH (ref 70–99)
HCT VFR BLD CALC: 29.5 % — LOW (ref 37–47)
HCT VFR BLD CALC: 29.5 % — LOW (ref 37–47)
HGB BLD-MCNC: 9.2 G/DL — LOW (ref 12–16)
HGB BLD-MCNC: 9.2 G/DL — LOW (ref 12–16)
IMM GRANULOCYTES NFR BLD AUTO: 0.9 % — HIGH (ref 0.1–0.3)
IMM GRANULOCYTES NFR BLD AUTO: 0.9 % — HIGH (ref 0.1–0.3)
INR BLD: 2.19 RATIO — HIGH (ref 0.65–1.3)
INR BLD: 2.19 RATIO — HIGH (ref 0.65–1.3)
LYMPHOCYTES # BLD AUTO: 0.97 K/UL — LOW (ref 1.2–3.4)
LYMPHOCYTES # BLD AUTO: 0.97 K/UL — LOW (ref 1.2–3.4)
LYMPHOCYTES # BLD AUTO: 7.9 % — LOW (ref 20.5–51.1)
LYMPHOCYTES # BLD AUTO: 7.9 % — LOW (ref 20.5–51.1)
MAGNESIUM SERPL-MCNC: 2 MG/DL — SIGNIFICANT CHANGE UP (ref 1.8–2.4)
MAGNESIUM SERPL-MCNC: 2 MG/DL — SIGNIFICANT CHANGE UP (ref 1.8–2.4)
MCHC RBC-ENTMCNC: 27.1 PG — SIGNIFICANT CHANGE UP (ref 27–31)
MCHC RBC-ENTMCNC: 27.1 PG — SIGNIFICANT CHANGE UP (ref 27–31)
MCHC RBC-ENTMCNC: 31.2 G/DL — LOW (ref 32–37)
MCHC RBC-ENTMCNC: 31.2 G/DL — LOW (ref 32–37)
MCV RBC AUTO: 86.8 FL — SIGNIFICANT CHANGE UP (ref 81–99)
MCV RBC AUTO: 86.8 FL — SIGNIFICANT CHANGE UP (ref 81–99)
MONOCYTES # BLD AUTO: 1.02 K/UL — HIGH (ref 0.1–0.6)
MONOCYTES # BLD AUTO: 1.02 K/UL — HIGH (ref 0.1–0.6)
MONOCYTES NFR BLD AUTO: 8.3 % — SIGNIFICANT CHANGE UP (ref 1.7–9.3)
MONOCYTES NFR BLD AUTO: 8.3 % — SIGNIFICANT CHANGE UP (ref 1.7–9.3)
NEUTROPHILS # BLD AUTO: 10.15 K/UL — HIGH (ref 1.4–6.5)
NEUTROPHILS # BLD AUTO: 10.15 K/UL — HIGH (ref 1.4–6.5)
NEUTROPHILS NFR BLD AUTO: 82.8 % — HIGH (ref 42.2–75.2)
NEUTROPHILS NFR BLD AUTO: 82.8 % — HIGH (ref 42.2–75.2)
NRBC # BLD: 0 /100 WBCS — SIGNIFICANT CHANGE UP (ref 0–0)
NRBC # BLD: 0 /100 WBCS — SIGNIFICANT CHANGE UP (ref 0–0)
PLATELET # BLD AUTO: 318 K/UL — SIGNIFICANT CHANGE UP (ref 130–400)
PLATELET # BLD AUTO: 318 K/UL — SIGNIFICANT CHANGE UP (ref 130–400)
PMV BLD: 10.1 FL — SIGNIFICANT CHANGE UP (ref 7.4–10.4)
PMV BLD: 10.1 FL — SIGNIFICANT CHANGE UP (ref 7.4–10.4)
POTASSIUM SERPL-MCNC: 3.7 MMOL/L — SIGNIFICANT CHANGE UP (ref 3.5–5)
POTASSIUM SERPL-MCNC: 3.7 MMOL/L — SIGNIFICANT CHANGE UP (ref 3.5–5)
POTASSIUM SERPL-SCNC: 3.7 MMOL/L — SIGNIFICANT CHANGE UP (ref 3.5–5)
POTASSIUM SERPL-SCNC: 3.7 MMOL/L — SIGNIFICANT CHANGE UP (ref 3.5–5)
PROT SERPL-MCNC: 6 G/DL — SIGNIFICANT CHANGE UP (ref 6–8)
PROT SERPL-MCNC: 6 G/DL — SIGNIFICANT CHANGE UP (ref 6–8)
PROTHROM AB SERPL-ACNC: 25.2 SEC — HIGH (ref 9.95–12.87)
PROTHROM AB SERPL-ACNC: 25.2 SEC — HIGH (ref 9.95–12.87)
RBC # BLD: 3.4 M/UL — LOW (ref 4.2–5.4)
RBC # BLD: 3.4 M/UL — LOW (ref 4.2–5.4)
RBC # FLD: 15.8 % — HIGH (ref 11.5–14.5)
RBC # FLD: 15.8 % — HIGH (ref 11.5–14.5)
SODIUM SERPL-SCNC: 138 MMOL/L — SIGNIFICANT CHANGE UP (ref 135–146)
SODIUM SERPL-SCNC: 138 MMOL/L — SIGNIFICANT CHANGE UP (ref 135–146)
VIT B12 SERPL-MCNC: 1021 PG/ML — SIGNIFICANT CHANGE UP (ref 232–1245)
VIT B12 SERPL-MCNC: 1021 PG/ML — SIGNIFICANT CHANGE UP (ref 232–1245)
WBC # BLD: 12.26 K/UL — HIGH (ref 4.8–10.8)
WBC # BLD: 12.26 K/UL — HIGH (ref 4.8–10.8)
WBC # FLD AUTO: 12.26 K/UL — HIGH (ref 4.8–10.8)
WBC # FLD AUTO: 12.26 K/UL — HIGH (ref 4.8–10.8)

## 2024-01-11 PROCEDURE — 99232 SBSQ HOSP IP/OBS MODERATE 35: CPT

## 2024-01-11 RX ORDER — FUROSEMIDE 40 MG
40 TABLET ORAL DAILY
Refills: 0 | Status: DISCONTINUED | OUTPATIENT
Start: 2024-01-11 | End: 2024-01-12

## 2024-01-11 RX ORDER — POTASSIUM CHLORIDE 20 MEQ
40 PACKET (EA) ORAL ONCE
Refills: 0 | Status: COMPLETED | OUTPATIENT
Start: 2024-01-11 | End: 2024-01-11

## 2024-01-11 RX ORDER — WARFARIN SODIUM 2.5 MG/1
5 TABLET ORAL ONCE
Refills: 0 | Status: COMPLETED | OUTPATIENT
Start: 2024-01-11 | End: 2024-01-11

## 2024-01-11 RX ADMIN — MEROPENEM 100 MILLIGRAM(S): 1 INJECTION INTRAVENOUS at 21:40

## 2024-01-11 RX ADMIN — MEROPENEM 100 MILLIGRAM(S): 1 INJECTION INTRAVENOUS at 07:07

## 2024-01-11 RX ADMIN — MAGNESIUM OXIDE 400 MG ORAL TABLET 400 MILLIGRAM(S): 241.3 TABLET ORAL at 12:54

## 2024-01-11 RX ADMIN — Medication 25 MICROGRAM(S): at 07:06

## 2024-01-11 RX ADMIN — ATORVASTATIN CALCIUM 80 MILLIGRAM(S): 80 TABLET, FILM COATED ORAL at 21:40

## 2024-01-11 RX ADMIN — REMDESIVIR 200 MILLIGRAM(S): 5 INJECTION INTRAVENOUS at 14:51

## 2024-01-11 RX ADMIN — MAGNESIUM OXIDE 400 MG ORAL TABLET 400 MILLIGRAM(S): 241.3 TABLET ORAL at 09:56

## 2024-01-11 RX ADMIN — WARFARIN SODIUM 5 MILLIGRAM(S): 2.5 TABLET ORAL at 21:40

## 2024-01-11 RX ADMIN — MAGNESIUM OXIDE 400 MG ORAL TABLET 400 MILLIGRAM(S): 241.3 TABLET ORAL at 18:02

## 2024-01-11 RX ADMIN — POLYETHYLENE GLYCOL 3350 17 GRAM(S): 17 POWDER, FOR SOLUTION ORAL at 12:54

## 2024-01-11 RX ADMIN — VALSARTAN 160 MILLIGRAM(S): 80 TABLET ORAL at 07:06

## 2024-01-11 RX ADMIN — Medication 40 MILLIEQUIVALENT(S): at 18:01

## 2024-01-11 RX ADMIN — Medication 50 MILLIGRAM(S): at 07:07

## 2024-01-11 RX ADMIN — AMLODIPINE BESYLATE 5 MILLIGRAM(S): 2.5 TABLET ORAL at 07:06

## 2024-01-11 RX ADMIN — PANTOPRAZOLE SODIUM 40 MILLIGRAM(S): 20 TABLET, DELAYED RELEASE ORAL at 07:07

## 2024-01-11 RX ADMIN — Medication 5 MILLIGRAM(S): at 21:40

## 2024-01-11 RX ADMIN — Medication 6 MILLIGRAM(S): at 07:08

## 2024-01-11 RX ADMIN — KETOTIFEN FUMARATE 1 DROP(S): 0.34 SOLUTION OPHTHALMIC at 14:01

## 2024-01-11 RX ADMIN — Medication 40 MILLIGRAM(S): at 07:08

## 2024-01-11 RX ADMIN — MEROPENEM 100 MILLIGRAM(S): 1 INJECTION INTRAVENOUS at 14:51

## 2024-01-11 RX ADMIN — Medication 40 MILLIGRAM(S): at 14:51

## 2024-01-11 NOTE — PHYSICAL THERAPY INITIAL EVALUATION ADULT - ADDITIONAL COMMENTS
Pt lives alone in house with 1 flight of stairs to enter, 1 flight of stairs to 2nd floor. Pt independent in ambulation and ADLs, reports limited ambulation since heart procedure ~1 month ago.

## 2024-01-11 NOTE — PHYSICAL THERAPY INITIAL EVALUATION ADULT - SITTING BALANCE: STATIC
1  )  Low 2 g sodium diet    2 )  Monitor weights at home    3 )  Avoid NSAIDs (ibuprofen, Motrin, Advil, Aleve, naproxen)    4 )  Monitor blood pressure at home, call if blood pressure greater than 150/90 persistently    5 ) I will plan to discuss all results including blood work, and/or imaging at our next visit, unless there is an urgent indication, in which case I will call you earlier  If you have any questions or concerns about your results, please feel free to call our office      6 )  Continue renal transplant evaluation    7 )  Increase calcitriol to 0 5 mcg daily    8 )  Continue close follow-up with Nephrology to monitor your renal function, 68 weeks with repeat blood work before your next visit    9 )  If you have any symptoms of persistent nausea/vomiting, metallic taste on the tongue or tremors in the hand or worsening shortness of breath, chest pain or swelling the legs please call me or go to the emergency room    10 )  Recommend peritoneal dialysis, for when you need to start dialysis    11 )  Recommend a tour of the dialysis unit    12 )  Recommend our Kidney Care meeting, to have advanced talks in regards to overall goals of care    13 )  Continue vitamin-D, Ergocalciferol 50,000 units weekly
good balance

## 2024-01-11 NOTE — PROGRESS NOTE ADULT - SUBJECTIVE AND OBJECTIVE BOX
SUBJECTIVE:    Patient is a 83y old Female who presents with a chief complaint of 83F admit w/ Acute Respiratory Failure with Hypoxia (10 Felipe 2024 20:16)    Currently admitted to medicine with the primary diagnosis of:    Today is hospital day 2d.     Overnight Events:     patient is cold. but not sob    PAST MEDICAL & SURGICAL HISTORY  HTN (hypertension)    Afib    Hypothyroid    FHx: spinal stenosis        SOCIAL HISTORY:  Smoking history:  Alcohol Use;  Illicit Drug Use:    ALLERGIES:  No Known Allergies    MEDICATIONS:  STANDING MEDICATIONS  amLODIPine   Tablet 5 milliGRAM(s) Oral daily  atorvastatin 80 milliGRAM(s) Oral at bedtime  bisacodyl 5 milliGRAM(s) Oral at bedtime  dexAMETHasone  Injectable 6 milliGRAM(s) IV Push daily  furosemide   Injectable 40 milliGRAM(s) IV Push two times a day  ketotifen 0.025% Ophthalmic Solution 1 Drop(s) Both EYES daily  levothyroxine 25 MICROGram(s) Oral daily  magnesium oxide 400 milliGRAM(s) Oral three times a day with meals  meropenem  IVPB 1000 milliGRAM(s) IV Intermittent every 8 hours  metoprolol succinate ER 50 milliGRAM(s) Oral daily  pantoprazole    Tablet 40 milliGRAM(s) Oral before breakfast  polyethylene glycol 3350 17 Gram(s) Oral daily  remdesivir  IVPB 100 milliGRAM(s) IV Intermittent every 24 hours  remdesivir  IVPB   IV Intermittent   valsartan 160 milliGRAM(s) Oral daily  warfarin 5 milliGRAM(s) Oral once    PRN MEDICATIONS  albuterol    90 MICROgram(s) HFA Inhaler 2 Puff(s) Inhalation every 6 hours PRN    VITALS:   ICU Vital Signs Last 24 Hrs  T(C): 36.6 (11 Jan 2024 07:38), Max: 36.6 (11 Jan 2024 07:00)  T(F): 97.9 (11 Jan 2024 07:38), Max: 97.9 (11 Jan 2024 07:00)  HR: 59 (11 Jan 2024 07:38) (59 - 60)  BP: 134/61 (11 Jan 2024 07:38) (125/58 - 144/62)  BP(mean): 84 (11 Jan 2024 00:37) (84 - 84)  ABP: --  ABP(mean): --  RR: 18 (11 Jan 2024 07:38) (18 - 18)  SpO2: 96% (11 Jan 2024 07:38) (85% - 99%)    O2 Parameters below as of 11 Jan 2024 07:38  Patient On (Oxygen Delivery Method): nasal cannula  O2 Flow (L/min): 3          LABS:                        9.2    12.26 )-----------( 318      ( 11 Jan 2024 08:32 )             29.5     01-11    138  |  95<L>  |  18  ----------------------------<  127<H>  3.7   |  31  |  0.5<L>    Ca    8.1<L>      11 Jan 2024 08:32  Mg     2.0     01-11    TPro  6.0  /  Alb  3.4<L>  /  TBili  1.3<H>  /  DBili  0.5<H>  /  AST  48<H>  /  ALT  27  /  AlkPhos  164<H>  01-11    PT/INR - ( 11 Jan 2024 08:32 )   PT: 25.20 sec;   INR: 2.19 ratio         PTT - ( 11 Jan 2024 08:32 )  PTT:38.4 sec  Urinalysis Basic - ( 11 Jan 2024 08:32 )    Color: x / Appearance: x / SG: x / pH: x  Gluc: 127 mg/dL / Ketone: x  / Bili: x / Urobili: x   Blood: x / Protein: x / Nitrite: x   Leuk Esterase: x / RBC: x / WBC x   Sq Epi: x / Non Sq Epi: x / Bacteria: x            Culture - Blood (collected 08 Jan 2024 19:33)  Source: .Blood Blood-Peripheral  Preliminary Report (11 Jan 2024 03:01):    No growth at 48 Hours    Culture - Blood (collected 08 Jan 2024 19:33)  Source: .Blood Blood-Peripheral  Preliminary Report (11 Jan 2024 03:01):    No growth at 48 Hours            RADIOLOGY:    PHYSICAL EXAM:  GENERAL: NAD, lying in bed comfortably  CHEST/LUNG: Clear to auscultation bilaterally; No rales, rhonchi, wheezing, or rubs. Unlabored respirations  HEART: Regular rate and rhythm; No murmurs, rubs, or gallops  ABDOMEN: Bowel sounds present; Soft, Nontender, Nondistended. No hepatomegally  EXTREMITIES:  2+ Peripheral Pulses, brisk capillary refill. No clubbing, cyanosis, or edema  NERVOUS SYSTEM:  Alert & Oriented X3, speech clear. No deficits   MSK: FROM all 4 extremities, full and equal strength  SKIN: No rashes or lesions

## 2024-01-11 NOTE — PHYSICAL THERAPY INITIAL EVALUATION ADULT - GENERAL OBSERVATIONS, REHAB EVAL
[id number] Pt received and left semifowlers in bed, NAD, pt agreeable to PT session +tele +SpO2 +3L O2 NC +primafit, daughter at bedside [id number] Pt received and left semifowlers in bed, NAD, pt agreeable to PT session +tele +SpO2 +3L O2 NC +primafit, daughter at bedside

## 2024-01-11 NOTE — PROGRESS NOTE ADULT - ASSESSMENT
83F w/ Rheumatic  Heart Disease s/p bioMVR/Tricuspid Annuloplasty(Dec2023 @[facility]) c/b pleural effusion s/p thoracentesis, Pericardial effusion & MDR Klebsiella Bacteremia d/c on Ertapenem, Chronic Afib on Warfarin(goal INR2.5-3.5, per patient's cardiologist), CHB s/p Micra, Klebsiella bacteremia admit for Acute Respiratory Failure with Hypoxia 2/2 COVID19 & Acute on Chronic HFpEF    #Acute hypoxic Respiratory Failure due to covid   - ID consulted  - c/w Remdesivir to complete on 1.13  & Dexamethasone  - wean off o2     #Acute on chronic HFpEF  - switch to po lasix  - c/w valsartan    #Rheumatic Heart Disease  - charted bioMVR & Tricuspid annuloplasty at [facility] Dec2023  - per patient, on warfarin with goal INR 2.5-3.5 per her surgeon & cardiologist?    #Bacteremia  - ID consulted  - c/w meropenem while inpatient  - CTA chest reviewed and noted anterior pleural nodularity vs effusion- per hx from chart review suspected 2/2 complications at [facility] w/ infection. continue treatment as noted above    #Chronic Afib  - c/w metoprolol for rate control  - continue warfarin    #HTN  - c/w amlodipine, valsartan, furosemide      #Progress Note Handoff  Pending (specify): PT consult, RDV completion, STR placment  Family discussion:  Disposition: snf 83F w/ Rheumatic  Heart Disease s/p bioMVR/Tricuspid Annuloplasty(Dec2023 @[facility]) c/b pleural effusion s/p thoracentesis, Pericardial effusion & MDR Klebsiella Bacteremia d/c on Ertapenem, Chronic Afib on Warfarin(goal INR2.5-3.5, per patient's cardiologist), CHB s/p Micra, Klebsiella bacteremia admit for Acute Respiratory Failure with Hypoxia 2/2 COVID19 & Acute on Chronic HFpEF    #Acute hypoxic Respiratory Failure due to covid   - ID consulted  - c/w Remdesivir to complete on 1.13  & Dexamethasone  - wean off o2     #Acute on chronic HFpEF  - switch to po lasix  - c/w valsartan    #Rheumatic Heart Disease  - charted bioMVR & Tricuspid annuloplasty at [facility] Dec2023  - per patient, on warfarin with goal INR 2.5-3.5 per her surgeon & cardiologist?    #Bacteremia  - ID consulted  - c/w meropenem while inpatient  - CTA chest reviewed and noted anterior pleural nodularity vs effusion- per hx from chart review suspected 2/2 complications at [facility] w/ infection. continue treatment as noted above    #Chronic Afib  - c/w metoprolol for rate control  - continue warfarin    #HTN  - c/w amlodipine, valsartan, furosemide      #Progress Note Handoff  Pending (specify): PT consult, RDV completion, STR placment  Family discussion:  Disposition: snf

## 2024-01-11 NOTE — PHYSICAL THERAPY INITIAL EVALUATION ADULT - PERTINENT HX OF CURRENT PROBLEM, REHAB EVAL
82 yo F w PMH of AF on warfarin, rheumatic MV disease, severe MR and TR s/p bioprosthetic MV replacement and TV annuloplasty on 12/13/2023 at [facility], c/b CHB s/p Micra opn 12/17, R thoracentesis for pleural effusion on 12/18/2023, , readmission at [facility] for MDR/ESBL Klebsiella bacteremia, positive urine and sputum cultures, s/p repeat R thoracentesis , possible infected pericardial effusion, discharged on 12/28 with PICC line for 4 weeks of IV ertapenem.   Pt was sent to the ED today by PCP as her INR was 8. She was also hypoxic on ambulation to 80s. Pt and daughter reports overall generalized weakness, poor appetite since discharge. Reports SOB on exertion and leg swelling. Took lasix 40 mg x 2 for 2-3 days as instructed by cardiologist now back to 40 mg daily.   Denies any melena, blood in stool/urine, no chest pain, fevers, cough.   Also tested positive for COVID-19 on 1/5  Of note pt was satting ok on RA, but  desats to 86 while ambulating here. 84 yo F w PMH of AF on warfarin, rheumatic MV disease, severe MR and TR s/p bioprosthetic MV replacement and TV annuloplasty on 12/13/2023 at [facility], c/b CHB s/p Micra opn 12/17, R thoracentesis for pleural effusion on 12/18/2023, , readmission at [facility] for MDR/ESBL Klebsiella bacteremia, positive urine and sputum cultures, s/p repeat R thoracentesis , possible infected pericardial effusion, discharged on 12/28 with PICC line for 4 weeks of IV ertapenem.   Pt was sent to the ED today by PCP as her INR was 8. She was also hypoxic on ambulation to 80s. Pt and daughter reports overall generalized weakness, poor appetite since discharge. Reports SOB on exertion and leg swelling. Took lasix 40 mg x 2 for 2-3 days as instructed by cardiologist now back to 40 mg daily.   Denies any melena, blood in stool/urine, no chest pain, fevers, cough.   Also tested positive for COVID-19 on 1/5  Of note pt was satting ok on RA, but  desats to 86 while ambulating here.

## 2024-01-12 ENCOUNTER — TRANSCRIPTION ENCOUNTER (OUTPATIENT)
Age: 84
End: 2024-01-12

## 2024-01-12 LAB
ALBUMIN SERPL ELPH-MCNC: 3 G/DL — LOW (ref 3.5–5.2)
ALP SERPL-CCNC: 138 U/L — HIGH (ref 30–115)
ALP SERPL-CCNC: 138 U/L — HIGH (ref 30–115)
ALP SERPL-CCNC: 147 U/L — HIGH (ref 30–115)
ALP SERPL-CCNC: 147 U/L — HIGH (ref 30–115)
ALT FLD-CCNC: 35 U/L — SIGNIFICANT CHANGE UP (ref 0–41)
ALT FLD-CCNC: 35 U/L — SIGNIFICANT CHANGE UP (ref 0–41)
ALT FLD-CCNC: 36 U/L — SIGNIFICANT CHANGE UP (ref 0–41)
ALT FLD-CCNC: 36 U/L — SIGNIFICANT CHANGE UP (ref 0–41)
ANION GAP SERPL CALC-SCNC: 10 MMOL/L — SIGNIFICANT CHANGE UP (ref 7–14)
ANION GAP SERPL CALC-SCNC: 10 MMOL/L — SIGNIFICANT CHANGE UP (ref 7–14)
APTT BLD: 37.6 SEC — SIGNIFICANT CHANGE UP (ref 27–39.2)
APTT BLD: 37.6 SEC — SIGNIFICANT CHANGE UP (ref 27–39.2)
AST SERPL-CCNC: 47 U/L — HIGH (ref 0–41)
AST SERPL-CCNC: 47 U/L — HIGH (ref 0–41)
AST SERPL-CCNC: 48 U/L — HIGH (ref 0–41)
AST SERPL-CCNC: 48 U/L — HIGH (ref 0–41)
BASOPHILS # BLD AUTO: 0.01 K/UL — SIGNIFICANT CHANGE UP (ref 0–0.2)
BASOPHILS # BLD AUTO: 0.01 K/UL — SIGNIFICANT CHANGE UP (ref 0–0.2)
BASOPHILS NFR BLD AUTO: 0.1 % — SIGNIFICANT CHANGE UP (ref 0–1)
BASOPHILS NFR BLD AUTO: 0.1 % — SIGNIFICANT CHANGE UP (ref 0–1)
BILIRUB DIRECT SERPL-MCNC: 0.4 MG/DL — HIGH (ref 0–0.3)
BILIRUB DIRECT SERPL-MCNC: 0.4 MG/DL — HIGH (ref 0–0.3)
BILIRUB INDIRECT FLD-MCNC: 0.5 MG/DL — SIGNIFICANT CHANGE UP (ref 0.2–1.2)
BILIRUB INDIRECT FLD-MCNC: 0.5 MG/DL — SIGNIFICANT CHANGE UP (ref 0.2–1.2)
BILIRUB SERPL-MCNC: 0.9 MG/DL — SIGNIFICANT CHANGE UP (ref 0.2–1.2)
BUN SERPL-MCNC: 23 MG/DL — HIGH (ref 10–20)
BUN SERPL-MCNC: 23 MG/DL — HIGH (ref 10–20)
CALCIUM SERPL-MCNC: 7.5 MG/DL — LOW (ref 8.4–10.5)
CALCIUM SERPL-MCNC: 7.5 MG/DL — LOW (ref 8.4–10.5)
CHLORIDE SERPL-SCNC: 94 MMOL/L — LOW (ref 98–110)
CHLORIDE SERPL-SCNC: 94 MMOL/L — LOW (ref 98–110)
CO2 SERPL-SCNC: 34 MMOL/L — HIGH (ref 17–32)
CO2 SERPL-SCNC: 34 MMOL/L — HIGH (ref 17–32)
CREAT SERPL-MCNC: 0.5 MG/DL — LOW (ref 0.7–1.5)
CREAT SERPL-MCNC: 0.5 MG/DL — LOW (ref 0.7–1.5)
EGFR: 93 ML/MIN/1.73M2 — SIGNIFICANT CHANGE UP
EGFR: 93 ML/MIN/1.73M2 — SIGNIFICANT CHANGE UP
EOSINOPHIL # BLD AUTO: 0.01 K/UL — SIGNIFICANT CHANGE UP (ref 0–0.7)
EOSINOPHIL # BLD AUTO: 0.01 K/UL — SIGNIFICANT CHANGE UP (ref 0–0.7)
EOSINOPHIL NFR BLD AUTO: 0.1 % — SIGNIFICANT CHANGE UP (ref 0–8)
EOSINOPHIL NFR BLD AUTO: 0.1 % — SIGNIFICANT CHANGE UP (ref 0–8)
GLUCOSE SERPL-MCNC: 113 MG/DL — HIGH (ref 70–99)
GLUCOSE SERPL-MCNC: 113 MG/DL — HIGH (ref 70–99)
HAPTOGLOB SERPL-MCNC: 114 MG/DL — SIGNIFICANT CHANGE UP (ref 34–200)
HAPTOGLOB SERPL-MCNC: 114 MG/DL — SIGNIFICANT CHANGE UP (ref 34–200)
HCT VFR BLD CALC: 23.9 % — LOW (ref 37–47)
HCT VFR BLD CALC: 23.9 % — LOW (ref 37–47)
HGB BLD-MCNC: 7.6 G/DL — LOW (ref 12–16)
HGB BLD-MCNC: 7.6 G/DL — LOW (ref 12–16)
IMM GRANULOCYTES NFR BLD AUTO: 0.7 % — HIGH (ref 0.1–0.3)
IMM GRANULOCYTES NFR BLD AUTO: 0.7 % — HIGH (ref 0.1–0.3)
INR BLD: 3.23 RATIO — HIGH (ref 0.65–1.3)
INR BLD: 3.23 RATIO — HIGH (ref 0.65–1.3)
LDH SERPL L TO P-CCNC: 401 — HIGH (ref 50–242)
LDH SERPL L TO P-CCNC: 401 — HIGH (ref 50–242)
LYMPHOCYTES # BLD AUTO: 1.11 K/UL — LOW (ref 1.2–3.4)
LYMPHOCYTES # BLD AUTO: 1.11 K/UL — LOW (ref 1.2–3.4)
LYMPHOCYTES # BLD AUTO: 10.4 % — LOW (ref 20.5–51.1)
LYMPHOCYTES # BLD AUTO: 10.4 % — LOW (ref 20.5–51.1)
MAGNESIUM SERPL-MCNC: 1.6 MG/DL — LOW (ref 1.8–2.4)
MAGNESIUM SERPL-MCNC: 1.6 MG/DL — LOW (ref 1.8–2.4)
MCHC RBC-ENTMCNC: 27.4 PG — SIGNIFICANT CHANGE UP (ref 27–31)
MCHC RBC-ENTMCNC: 27.4 PG — SIGNIFICANT CHANGE UP (ref 27–31)
MCHC RBC-ENTMCNC: 31.8 G/DL — LOW (ref 32–37)
MCHC RBC-ENTMCNC: 31.8 G/DL — LOW (ref 32–37)
MCV RBC AUTO: 86.3 FL — SIGNIFICANT CHANGE UP (ref 81–99)
MCV RBC AUTO: 86.3 FL — SIGNIFICANT CHANGE UP (ref 81–99)
MONOCYTES # BLD AUTO: 1.33 K/UL — HIGH (ref 0.1–0.6)
MONOCYTES # BLD AUTO: 1.33 K/UL — HIGH (ref 0.1–0.6)
MONOCYTES NFR BLD AUTO: 12.4 % — HIGH (ref 1.7–9.3)
MONOCYTES NFR BLD AUTO: 12.4 % — HIGH (ref 1.7–9.3)
NEUTROPHILS # BLD AUTO: 8.15 K/UL — HIGH (ref 1.4–6.5)
NEUTROPHILS # BLD AUTO: 8.15 K/UL — HIGH (ref 1.4–6.5)
NEUTROPHILS NFR BLD AUTO: 76.3 % — HIGH (ref 42.2–75.2)
NEUTROPHILS NFR BLD AUTO: 76.3 % — HIGH (ref 42.2–75.2)
NRBC # BLD: 0 /100 WBCS — SIGNIFICANT CHANGE UP (ref 0–0)
NRBC # BLD: 0 /100 WBCS — SIGNIFICANT CHANGE UP (ref 0–0)
PLATELET # BLD AUTO: 275 K/UL — SIGNIFICANT CHANGE UP (ref 130–400)
PLATELET # BLD AUTO: 275 K/UL — SIGNIFICANT CHANGE UP (ref 130–400)
PMV BLD: 10.3 FL — SIGNIFICANT CHANGE UP (ref 7.4–10.4)
PMV BLD: 10.3 FL — SIGNIFICANT CHANGE UP (ref 7.4–10.4)
POTASSIUM SERPL-MCNC: 3.2 MMOL/L — LOW (ref 3.5–5)
POTASSIUM SERPL-MCNC: 3.2 MMOL/L — LOW (ref 3.5–5)
POTASSIUM SERPL-SCNC: 3.2 MMOL/L — LOW (ref 3.5–5)
POTASSIUM SERPL-SCNC: 3.2 MMOL/L — LOW (ref 3.5–5)
PROT SERPL-MCNC: 5.1 G/DL — LOW (ref 6–8)
PROT SERPL-MCNC: 5.1 G/DL — LOW (ref 6–8)
PROT SERPL-MCNC: 5.2 G/DL — LOW (ref 6–8)
PROT SERPL-MCNC: 5.2 G/DL — LOW (ref 6–8)
PROTHROM AB SERPL-ACNC: 37.3 SEC — HIGH (ref 9.95–12.87)
PROTHROM AB SERPL-ACNC: 37.3 SEC — HIGH (ref 9.95–12.87)
RBC # BLD: 2.77 M/UL — LOW (ref 4.2–5.4)
RBC # FLD: 15.6 % — HIGH (ref 11.5–14.5)
RBC # FLD: 15.6 % — HIGH (ref 11.5–14.5)
RETICS #: 116.1 K/UL — SIGNIFICANT CHANGE UP (ref 25–125)
RETICS #: 116.1 K/UL — SIGNIFICANT CHANGE UP (ref 25–125)
RETICS/RBC NFR: 4.2 % — HIGH (ref 0.5–1.5)
RETICS/RBC NFR: 4.2 % — HIGH (ref 0.5–1.5)
SODIUM SERPL-SCNC: 138 MMOL/L — SIGNIFICANT CHANGE UP (ref 135–146)
SODIUM SERPL-SCNC: 138 MMOL/L — SIGNIFICANT CHANGE UP (ref 135–146)
WBC # BLD: 10.69 K/UL — SIGNIFICANT CHANGE UP (ref 4.8–10.8)
WBC # BLD: 10.69 K/UL — SIGNIFICANT CHANGE UP (ref 4.8–10.8)
WBC # FLD AUTO: 10.69 K/UL — SIGNIFICANT CHANGE UP (ref 4.8–10.8)
WBC # FLD AUTO: 10.69 K/UL — SIGNIFICANT CHANGE UP (ref 4.8–10.8)

## 2024-01-12 PROCEDURE — 93306 TTE W/DOPPLER COMPLETE: CPT | Mod: 26

## 2024-01-12 PROCEDURE — 99233 SBSQ HOSP IP/OBS HIGH 50: CPT

## 2024-01-12 RX ORDER — POTASSIUM CHLORIDE 20 MEQ
40 PACKET (EA) ORAL ONCE
Refills: 0 | Status: COMPLETED | OUTPATIENT
Start: 2024-01-12 | End: 2024-01-12

## 2024-01-12 RX ORDER — FUROSEMIDE 40 MG
40 TABLET ORAL DAILY
Refills: 0 | Status: DISCONTINUED | OUTPATIENT
Start: 2024-01-12 | End: 2024-01-18

## 2024-01-12 RX ORDER — WARFARIN SODIUM 2.5 MG/1
2 TABLET ORAL ONCE
Refills: 0 | Status: COMPLETED | OUTPATIENT
Start: 2024-01-12 | End: 2024-01-12

## 2024-01-12 RX ORDER — MAGNESIUM SULFATE 500 MG/ML
2 VIAL (ML) INJECTION ONCE
Refills: 0 | Status: COMPLETED | OUTPATIENT
Start: 2024-01-12 | End: 2024-01-12

## 2024-01-12 RX ORDER — CHLORHEXIDINE GLUCONATE 213 G/1000ML
1 SOLUTION TOPICAL DAILY
Refills: 0 | Status: DISCONTINUED | OUTPATIENT
Start: 2024-01-12 | End: 2024-01-18

## 2024-01-12 RX ADMIN — WARFARIN SODIUM 2 MILLIGRAM(S): 2.5 TABLET ORAL at 21:31

## 2024-01-12 RX ADMIN — MEROPENEM 100 MILLIGRAM(S): 1 INJECTION INTRAVENOUS at 21:31

## 2024-01-12 RX ADMIN — VALSARTAN 160 MILLIGRAM(S): 80 TABLET ORAL at 05:50

## 2024-01-12 RX ADMIN — MEROPENEM 100 MILLIGRAM(S): 1 INJECTION INTRAVENOUS at 13:21

## 2024-01-12 RX ADMIN — Medication 25 MICROGRAM(S): at 05:49

## 2024-01-12 RX ADMIN — REMDESIVIR 200 MILLIGRAM(S): 5 INJECTION INTRAVENOUS at 13:59

## 2024-01-12 RX ADMIN — AMLODIPINE BESYLATE 5 MILLIGRAM(S): 2.5 TABLET ORAL at 05:50

## 2024-01-12 RX ADMIN — Medication 50 MILLIGRAM(S): at 05:49

## 2024-01-12 RX ADMIN — MAGNESIUM OXIDE 400 MG ORAL TABLET 400 MILLIGRAM(S): 241.3 TABLET ORAL at 08:27

## 2024-01-12 RX ADMIN — MAGNESIUM OXIDE 400 MG ORAL TABLET 400 MILLIGRAM(S): 241.3 TABLET ORAL at 17:43

## 2024-01-12 RX ADMIN — PANTOPRAZOLE SODIUM 40 MILLIGRAM(S): 20 TABLET, DELAYED RELEASE ORAL at 06:05

## 2024-01-12 RX ADMIN — ATORVASTATIN CALCIUM 80 MILLIGRAM(S): 80 TABLET, FILM COATED ORAL at 21:30

## 2024-01-12 RX ADMIN — MAGNESIUM OXIDE 400 MG ORAL TABLET 400 MILLIGRAM(S): 241.3 TABLET ORAL at 11:12

## 2024-01-12 RX ADMIN — Medication 40 MILLIGRAM(S): at 05:50

## 2024-01-12 RX ADMIN — Medication 5 MILLIGRAM(S): at 21:30

## 2024-01-12 RX ADMIN — MEROPENEM 100 MILLIGRAM(S): 1 INJECTION INTRAVENOUS at 05:51

## 2024-01-12 RX ADMIN — Medication 6 MILLIGRAM(S): at 05:49

## 2024-01-12 RX ADMIN — Medication 40 MILLIEQUIVALENT(S): at 17:58

## 2024-01-12 RX ADMIN — Medication 25 GRAM(S): at 17:58

## 2024-01-12 RX ADMIN — CHLORHEXIDINE GLUCONATE 1 APPLICATION(S): 213 SOLUTION TOPICAL at 11:17

## 2024-01-12 RX ADMIN — Medication 40 MILLIGRAM(S): at 17:58

## 2024-01-12 NOTE — DISCHARGE NOTE PROVIDER - HOSPITAL COURSE
Patient is an 82 yo F w PMH of AF on warfarin, rheumatic MV disease, severe MR and TR s/p bioprosthetic MV replacement and TV annuloplasty on 12/13/2023 at [facility], c/b CHB s/p Micra opn 12/17, R thoracentesis for pleural effusion on 12/18/2023, , readmission at [facility] for MDR/ESBL Klebsiella bacteremia, positive urine and sputum cultures, s/p repeat R thoracentesis , possible infected pericardial effusion, discharged on 12/28 with PICC line for 4 weeks of IV ertapenem. Patient presented to ED and was sent by her PCP for INR of 8. She was also hypoxic on ambulation to 80s. Pt and daughter reports overall generalized weakness, poor appetite since discharge. Reports SOB on exertion and leg swelling. Took lasix 40 mg x 2 for 2-3 days as instructed by cardiologist now back to 40 mg daily. Denied any melena, blood in stool/urine, no chest pain, fevers, cough. To note, patient tested positive for COVID-19 on 1/5    In ED,    spo2 96 on RA however desatted to 86 while ambulating. Labs significant for Hb 8.6   k 2.5   alk phos 166   bnp 7k  co2 51 on ABG, compensated covid+. CT chest showed bilateral areas of peritoneal nodularity versus complex loculated fluid, greatest at right anteromedial epicardial region measuring up to 5 x 2 cm. Findings could represent empyema, given report of outside diagnosis. Consider less likely alternative possibilities of pleural tumor versus right middle lobe mass. Follow-up recommended; Cardiomegaly. Small pericardial effusion. Interstitial edema. Diffuse lung groundglass opacities, consistent with pulmonary edema in the appropriate clinical setting. Findings compatible with CHF; No pulmonary embolism.     Patient admitted to medicine and found to have acute on chronic HFpEF exacerbation treated with diuretics, acute hypoxic respiratory failure due to COVID, treated with remdesivir and dexamethasone, bacteremia treating with IV antibiotics. Clinical improvement noted with treatment. Patient stable for discharge.   Patient is an 82 yo F w PMH of AF on warfarin, rheumatic MV disease, severe MR and TR s/p bioprosthetic MV replacement and TV annuloplasty on 12/13/2023 at [facility], c/b CHB s/p Micra opn 12/17, R thoracentesis for pleural effusion on 12/18/2023, , readmission at [facility] for MDR/ESBL Klebsiella bacteremia, positive urine and sputum cultures, s/p repeat R thoracentesis , possible infected pericardial effusion, discharged on 12/28 with PICC line for 4 weeks of IV ertapenem. Patient presented to ED and was sent by her PCP for INR of 8. She was also hypoxic on ambulation to 80s. Pt and daughter reports overall generalized weakness, poor appetite since discharge. Reports SOB on exertion and leg swelling. Took lasix 40 mg x 2 for 2-3 days as instructed by cardiologist now back to 40 mg daily. Denied any melena, blood in stool/urine, no chest pain, fevers, cough. To note, patient tested positive for COVID-19 on 1/5    In ED,    spo2 96 on RA however desatted to 86 while ambulating. Labs significant for Hb 8.6   k 2.5   alk phos 166   bnp 7k  co2 51 on ABG, compensated covid+. CT chest showed bilateral areas of peritoneal nodularity versus complex loculated fluid, greatest at right anteromedial epicardial region measuring up to 5 x 2 cm. Findings could represent empyema, given report of outside diagnosis. Consider less likely alternative possibilities of pleural tumor versus right middle lobe mass. Follow-up recommended; Cardiomegaly. Small pericardial effusion. Interstitial edema. Diffuse lung groundglass opacities, consistent with pulmonary edema in the appropriate clinical setting. Findings compatible with CHF; No pulmonary embolism.     Patient admitted to medicine and found to have acute on chronic HFpEF exacerbation treated with diuretics, acute hypoxic respiratory failure due to COVID, treated with remdesivir and dexamethasone, bacteremia treating with IV antibiotics. Clinical improvement noted with treatment. Patient stable for discharge.   Patient is an 84 yo F w PMH of AF on warfarin, rheumatic MV disease, severe MR and TR s/p bioprosthetic MV replacement and TV annuloplasty on 12/13/2023 at [facility], c/b CHB s/p Micra opn 12/17, R thoracentesis for pleural effusion on 12/18/2023, , readmission at [facility] for MDR/ESBL Klebsiella bacteremia, positive urine and sputum cultures, s/p repeat R thoracentesis , possible infected pericardial effusion, discharged on 12/28 with PICC line for 4 weeks of IV ertapenem. Patient presented to ED and was sent by her PCP for INR of 8. She was also hypoxic on ambulation to 80s. Pt and daughter reports overall generalized weakness, poor appetite since discharge. Reports SOB on exertion and leg swelling. Took lasix 40 mg x 2 for 2-3 days as instructed by cardiologist now back to 40 mg daily. Denied any melena, blood in stool/urine, no chest pain, fevers, cough. To note, patient tested positive for COVID-19 on 1/5    In ED,    spo2 96 on RA however desatted to 86 while ambulating. Labs significant for Hb 8.6   k 2.5   alk phos 166   bnp 7k  co2 51 on ABG, compensated covid+. CT chest showed bilateral areas of peritoneal nodularity versus complex loculated fluid, greatest at right anteromedial epicardial region measuring up to 5 x 2 cm. Findings could represent empyema, given report of outside diagnosis. Consider less likely alternative possibilities of pleural tumor versus right middle lobe mass. Follow-up recommended; Cardiomegaly. Small pericardial effusion. Interstitial edema. Diffuse lung groundglass opacities, consistent with pulmonary edema in the appropriate clinical setting. Findings compatible with CHF; No pulmonary embolism.     Patient admitted to medicine and found to have acute on chronic HFpEF exacerbation treated with diuretics, acute hypoxic respiratory failure due to COVID, treated with remdesivir and dexamethasone, bacteremia treating with IV antibiotics. Clinical improvement noted with treatment. Patient stable for discharge.   Patient is an 84 yo F w PMH of AF on warfarin, rheumatic MV disease, severe MR and TR s/p bioprosthetic MV replacement and TV annuloplasty on 12/13/2023 at [facility], c/b CHB s/p Micra opn 12/17, R thoracentesis for pleural effusion on 12/18/2023, , readmission at [facility] for MDR/ESBL Klebsiella bacteremia, positive urine and sputum cultures, s/p repeat R thoracentesis , possible infected pericardial effusion, discharged on 12/28 with PICC line for 4 weeks of IV ertapenem. Patient presented to ED and was sent by her PCP for INR of 8. She was also hypoxic on ambulation to 80s. Pt and daughter reports overall generalized weakness, poor appetite since discharge. Reports SOB on exertion and leg swelling. Took lasix 40 mg x 2 for 2-3 days as instructed by cardiologist now back to 40 mg daily. Denied any melena, blood in stool/urine, no chest pain, fevers, cough. To note, patient tested positive for COVID-19 on 1/5    In ED,    spo2 96 on RA however desatted to 86 while ambulating. Labs significant for Hb 8.6   k 2.5   alk phos 166   bnp 7k  co2 51 on ABG, compensated covid+. CT chest showed bilateral areas of peritoneal nodularity versus complex loculated fluid, greatest at right anteromedial epicardial region measuring up to 5 x 2 cm. Findings could represent empyema, given report of outside diagnosis. Consider less likely alternative possibilities of pleural tumor versus right middle lobe mass. Follow-up recommended; Cardiomegaly. Small pericardial effusion. Interstitial edema. Diffuse lung groundglass opacities, consistent with pulmonary edema in the appropriate clinical setting. Findings compatible with CHF; No pulmonary embolism.     Patient admitted to medicine and found to have acute on chronic HFpEF exacerbation treated with diuretics, acute hypoxic respiratory failure due to COVID, treated with remdesivir and dexamethasone, bacteremia treating with IV antibiotics. Clinical improvement noted with treatment. Patient stable for discharge.

## 2024-01-12 NOTE — DISCHARGE NOTE PROVIDER - CARE PROVIDER_API CALL
Jack Alvarez  Infectious Disease  [address] [place] [address]  Phone: [phone]  Fax: [phone]  Follow Up Time: 2 weeks    Martin Green  Interventional Cardiology  [address] [place] [address], [address]  [place] [address]  Phone: [phone]  Fax: [phone]  Follow Up Time: 2 weeks    Monse Tamayo  Internal Medicine  [address] [place] [address]  Phone: [phone]  Fax: [phone]  Established Patient  Follow Up Time: 2 weeks   Jack Alvarez  Infectious Disease  [address] [place] [address]  Phone: [phone]  Fax: [phone]  Follow Up Time: 2 weeks    Martin Green  Interventional Cardiology  [address] [place] [address], [address]  [place] [address]  Phone: [phone]  Fax: [phone]  Follow Up Time: 2 weeks    Monse Tamayo  Internal Medicine  [address] [place] [address]  Phone: [phone]  Fax: [phone]  Established Patient  Follow Up Time: 2 weeks   Jack Alvarez  Infectious Disease  [address] [place] [address]  Phone: [phone]  Fax: [phone]  Follow Up Time: 2 weeks    Martin Green  Interventional Cardiology  [address] [place] [address], [address]  [place] [address]  Phone: [phone]  Fax: [phone]  Follow Up Time: 2 weeks    Monse Tamayo  Internal Medicine  [address] [place] [address]  Phone: [phone]  Fax: [phone]  Established Patient  Follow Up Time: 2 weeks   Jack Alvarez  Infectious Disease  [address] [place] [address]  Phone: [phone]  Fax: [phone]  Follow Up Time: 2 weeks    Martin Green  Interventional Cardiology  [address] [place] [address], [address]  [place] [address]  Phone: [phone]  Fax: [phone]  Follow Up Time: 2 weeks    Monse Tamayo  Internal Medicine  [address] [place] [address]  Phone: [phone]  Fax: [phone]  Established Patient  Follow Up Time: 2 weeks

## 2024-01-12 NOTE — DISCHARGE NOTE PROVIDER - ATTENDING DISCHARGE PHYSICAL EXAMINATION:
Attending attestation  Attending DC note  Pt seen and examined at bedside. No cp or sob. k stable   vitals, labs, exam stable  Hospital course as above.  Plan dw pt and agreed to plan  Medically cleared for DC. Med recc completed.  ENIO resident. Spent 32 mins on case

## 2024-01-12 NOTE — DISCHARGE NOTE PROVIDER - NPI NUMBER (FOR SYSADMIN USE ONLY) :
[[id number]],[[id number]],[[id number]] [[id number]],[[id number]],[[id number]] [[id number]],[[id number]],[[id number]] [[id number]],[[id number]],[[id number]]

## 2024-01-12 NOTE — DISCHARGE NOTE PROVIDER - PREFACE STATEMENT FOR MINUTES SPENT
Jabier Keenan is a 12 month old female who is accompanied by:mother     Well Child Assessment:  History was provided by the mother.   Nutrition  Types of milk consumed include breast feeding. 27 ounces of milk or formula are consumed every 24 hours. Types of cereal consumed include rice and oat. Types of intake include cereals, eggs, fruits, meats and vegetables. There are no difficulties with feeding.   Dental  The patient has teething symptoms. Tooth eruption is in progress.  Elimination  Elimination problems do not include constipation or diarrhea.   Safety  There is an appropriate car seat in use.   Screening  There are no risk factors for hearing loss. There are no risk factors for tuberculosis.   Social  The caregiver enjoys the child. Childcare is provided at child's home. The childcare provider is a parent.       HPI  Additional concerns today: None     In Physical therapy making improvements     Review of Systems   Constitutional: Negative for activity change, appetite change and fever.   HENT: Negative for rhinorrhea and sore throat.    Respiratory: Negative for cough.    Gastrointestinal: Negative for constipation, diarrhea and vomiting.       Patient's medications, allergies, past medical, surgical, social and family histories were reviewed and updated as appropriate.    Objective   Temp 97.6 °F (36.4 °C) (Temporal)   Ht 29.53\" (75 cm)   Wt 9.08 kg (20 lb 0.3 oz)   HC 45 cm (17.72\")   BMI 16.14 kg/m²   BSA 0.42 m²   BMI Percentile: 44 %ile (Z= -0.15) based on WHO (Girls, 0-2 years) BMI-for-age based on BMI available as of 10/30/2023.  Growth parameters appropriate for age? Yes except for gross motor delay    Physical Exam  Vitals and nursing note reviewed.   Constitutional:       General: She is active.      Appearance: She is well-developed.   HENT:      Right Ear: Tympanic membrane normal.      Left Ear: Tympanic membrane normal.      Mouth/Throat:      Mouth: Mucous membranes are moist.       Pharynx: Oropharynx is clear.      Tonsils: No tonsillar exudate.      Neck: Neck supple.   Eyes:      General:         Right eye: No discharge.         Left eye: No discharge.      Pupils: Pupils are equal, round, and reactive to light.   Cardiovascular:      Rate and Rhythm: Regular rhythm.      Heart sounds: S1 normal and S2 normal. No murmur heard.  Pulmonary:      Effort: Pulmonary effort is normal. No respiratory distress.      Breath sounds: No wheezing or rales.   Abdominal:      Palpations: Abdomen is soft.      Tenderness: There is no abdominal tenderness.   Musculoskeletal:         General: Normal range of motion.   Skin:     General: Skin is moist.   Neurological:      Mental Status: She is alert.         Assessment   Problem List Items Addressed This Visit    None  Visit Diagnoses     Encounter for routine child health examination with abnormal findings    -  Primary    Need for vaccination        Relevant Orders    VARICELLA CHICKEN POX LIVES VACC, SQ (VARIVAX) (Completed)    MMR VACC, SQ (Completed)    Hepatitis A vaccine pediatric / adolescent 2 dose IM (Completed)    PNEUMOCOCCAL CONJUGATE 20 VALENT VACC (PREVNAR-20) (Completed)    Screening for deficiency anemia        Relevant Orders    POCT Hemoglobin (Completed)    Encounter for screening for certain developmental disorders in childhood        Gross motor delay              Screening tests    Developmental milestones were reviewed and were: normal based on age except for gross motor delay    Immunizations today: per orders.  History of previous adverse reactions to immunizations? no  Immunizations given today and vaccine counseling including benefits, risks, and adverse reactions were provided by myself during the visit.    Follow-up for the 15 month well child visit, or sooner as needed.    Continue PT   I personally spent

## 2024-01-12 NOTE — DISCHARGE NOTE PROVIDER - NSDCCPCAREPLAN_GEN_ALL_CORE_FT
PRINCIPAL DISCHARGE DIAGNOSIS  Diagnosis: COVID  Assessment and Plan of Treatment: You came to the hospital with weakness, loss of appetite, shortness of breath. You were found to be COVID positive. You were seen by infectious disease and started on antiviral medications and steroids. Your symptoms improved and you are clinically stable for discharge. Please continue to take all of your medications as prescribed and follow up with your PCP.  Call your local emergency number (911 in the [place]) if:  You have trouble breathing or shortness of breath at rest.  You have chest pain or pressure that lasts longer than 5 minutes.  You become confused or hard to wake.  When should I seek immediate care?  The skin on your face, fingers, or toes look blue or darker than usual.  When should I call my doctor?  You have a fever.  You have questions or concerns about your condition or care.        SECONDARY DISCHARGE DIAGNOSES  Diagnosis: Bacteremia  Assessment and Plan of Treatment: You were seen by infectious disease for your blood infection. You were to be discharged with IV antibiotics. Please complete your course of antibiotics.    Diagnosis: Hypoxia  Assessment and Plan of Treatment:     Diagnosis: Coumadin toxicity  Assessment and Plan of Treatment:     Diagnosis: Elevated troponin  Assessment and Plan of Treatment:     Diagnosis: Fluid overload  Assessment and Plan of Treatment: You were fluid overloaded when you came to the hospital. You were diuresised with Lasix and your volume status improved. Please continue to take all of your medications as prescribed.

## 2024-01-12 NOTE — PROGRESS NOTE ADULT - ASSESSMENT
82 yo F w PMH of AF on warfarin, rheumatic MV disease, severe MR and TR s/p bioprosthetic MV replacement and TV annuloplasty on 12/13/2023 at [facility], c/b CHB s/p Micra opn 12/17, R thoracentesis for pleural effusion on 12/18/2023, , readmission at [facility] for MDR/ESBL Klebsiella bacteremia, positive urine and sputum cultures, s/p repeat R thoracentesis , possible infected pericardial effusion, discharged on 12/28 with PICC line for 4 weeks of IV ertapenem.   Pt was sent to the ED today by PCP as her INR was 8. She was also hypoxic on ambulation to 80s.       Acute HFpEF  Covid-19 infection  S/P recent MV replacement (bioprosthetic) + TV annuloplasty  Complete heart block s/p Micra  H/O recent ESBL Klebsiella bacteremia   Supratherapeutic INR                 PLAN:     ·	Tele reviewed.   ·	EKG:   ·	CTA chest noted. No acute PE. Pt is fluid overload  ·	Lasix 40 mg iv daily. first dose now  ·	Check i's and o's and daily wt  ·	Low salt diet and water restriction to 1.5 L/D  ·	Monitor electrolytes and renal function. Replete K and Mg.   ·	ID eval noted. Recommended IV RDV for 5 days  and dexamethasone 6 mg iv q24h for 10 days. If discharged earlier then d/c steroids.   ·	H/O ESBL Klebsiella bacteremia. ID recommended Meropenem 1 gm iv q8h for now and on discharge Ertapenem 1 gm iv q24h with  end date 4 weeks from 12/28 and to be f/u by ID at [facility].   ·	INR is 3.23. 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, c/b CHB s/p Micra opn 12/17, R thoracentesis for pleural effusion on 12/18/2023, , readmission at [facility] for MDR/ESBL Klebsiella bacteremia, positive urine and sputum cultures, s/p repeat R thoracentesis , possible infected pericardial effusion, discharged on 12/28 with PICC line for 4 weeks of IV ertapenem.   Pt was sent to the ED today by PCP as her INR was 8. She was also hypoxic on ambulation to 80s.       Acute HFpEF  Covid-19 infection  S/P recent MV replacement (bioprosthetic) + TV annuloplasty  Complete heart block s/p Micra  H/O recent ESBL Klebsiella bacteremia   Supratherapeutic INR                 PLAN:     ·	Tele reviewed.   ·	EKG:   ·	CTA chest noted. No acute PE. Pt is fluid overload  ·	Lasix 40 mg iv daily. first dose now  ·	Check i's and o's and daily wt  ·	Low salt diet and water restriction to 1.5 L/D  ·	Monitor electrolytes and renal function. Replete K and Mg.   ·	ID eval noted. Recommended IV RDV for 5 days  and dexamethasone 6 mg iv q24h for 10 days. If discharged earlier then d/c steroids.   ·	H/O ESBL Klebsiella bacteremia. ID recommended Meropenem 1 gm iv q8h for now and on discharge Ertapenem 1 gm iv q24h with  end date 4 weeks from 12/28 and to be f/u by ID at [facility].   ·	INR is 3.23. Give Coumadin 2 mg po tonight and check INR in AM    Progress Note Handoff    Pending (specify):  Consults_________, Tests________, Test Results_______, Other__Fluid overload, Covid-19 infection_______  Family discussion:  Disposition: Home___/SNF___/Other________/Unknown at this time________    Armando Pinto MD  Spectra: [phone]

## 2024-01-12 NOTE — DISCHARGE NOTE PROVIDER - PROVIDER TOKENS
PROVIDER:[TOKEN:[[id number]],FOLLOWUP:[2 weeks]],PROVIDER:[TOKEN:[[id number]],FOLLOWUP:[2 weeks]],PROVIDER:[TOKEN:[[id number]],FOLLOWUP:[2 weeks],ESTABLISHEDPATIENT:[T]] PROVIDER:[TOKEN:[[id number]],FOLLOWUP:[2 weeks]],PROVIDER:[TOKEN:[[id number]],FOLLOWUP:[2 weeks]],PROVIDER:[TOKEN:[[id number]],FOLLOWUP:[2 weeks],ESTABLISHEDPATIENT:[T]] PROVIDER:[TOKEN:[[id number]],FOLLOWUP:[2 weeks]],PROVIDER:[TOKEN:[[id number]],FOLLOWUP:[2 weeks]],PROVIDER:[TOKEN:[[id number]],FOLLOWUP:[2 weeks],ESTABLISHEDPATIENT:[T]] PROVIDER:[TOKEN:[[id number]],FOLLOWUP:[2 weeks]],PROVIDER:[TOKEN:[[id number]],FOLLOWUP:[2 weeks]],PROVIDER:[TOKEN:[[id number]],FOLLOWUP:[2 weeks],ESTABLISHEDPATIENT:[T]]

## 2024-01-12 NOTE — DISCHARGE NOTE PROVIDER - NSDCMRMEDTOKEN_GEN_ALL_CORE_FT
Albuterol (Eqv-ProAir HFA) 90 mcg/inh inhalation aerosol: 2 puff(s) inhaled 2 times a day as needed for  bronchospasm  alendronate 35 mg oral tablet: 1 tab(s) orally once a week  amLODIPine 5 mg oral tablet: 1 tab(s) orally once a day  bisacodyl 5 mg oral tablet: 1 tab(s) orally once a day  Coumadin 5 mg oral tablet: 1 tab(s) orally once a day (at bedtime) MDD:1  ertapenem 1 g injection: 1 gram(s) intravenously once a day  Lasix 40 mg oral tablet: 1 tab(s) orally once a day  Metoprolol Succinate ER 50 mg oral tablet, extended release: 1 tab(s) orally once a day  MiraLax oral powder for reconstitution: 17 gram(s) orally once a day  olopatadine 0.1% ophthalmic solution: 1 drop(s) in each affected eye once a day  rosuvastatin 20 mg oral tablet: 1 tab(s) orally once a day (at bedtime)  Synthroid 25 mcg (0.025 mg) oral tablet: 1 tab(s) orally once a day  valsartan-hydrochlorothiazide 160 mg-25 mg oral tablet: 1 tab(s) orally once a day   Albuterol (Eqv-ProAir HFA) 90 mcg/inh inhalation aerosol: 2 puff(s) inhaled 2 times a day as needed for  bronchospasm  alendronate 35 mg oral tablet: 1 tab(s) orally once a week  amLODIPine 5 mg oral tablet: 1 tab(s) orally once a day  bisacodyl 5 mg oral tablet: 1 tab(s) orally once a day  ertapenem 1 g injection: 1 gram(s) intravenously once a day  Lasix 40 mg oral tablet: 1 tab(s) orally once a day  Metoprolol Succinate ER 50 mg oral tablet, extended release: 1 tab(s) orally once a day  MiraLax oral powder for reconstitution: 17 gram(s) orally once a day  olopatadine 0.1% ophthalmic solution: 1 drop(s) in each affected eye once a day  rosuvastatin 20 mg oral tablet: 1 tab(s) orally once a day (at bedtime)  Synthroid 25 mcg (0.025 mg) oral tablet: 1 tab(s) orally once a day  valsartan-hydrochlorothiazide 160 mg-25 mg oral tablet: 1 tab(s) orally once a day  warfarin 3 mg oral tablet: 1 tab(s) orally once a day (at bedtime)

## 2024-01-12 NOTE — DISCHARGE NOTE PROVIDER - CARE PROVIDERS DIRECT ADDRESSES
,DirectAddress_Unknown,[email or web address] ,DirectAddress_Unknown,[email or web address] ,DirectAddress_Unknown,[email or web address] ,DirectAddress_Unknown,[email or web address]

## 2024-01-12 NOTE — PROGRESS NOTE ADULT - SUBJECTIVE AND OBJECTIVE BOX
SORAYA KING  83y Female    CHIEF COMPLAINT:    Patient is a 83y old  Female who presents with a chief complaint of 83F admit w/ Acute Respiratory Failure with Hypoxia (10 Felipe 2024 20:16)      INTERVAL HPI/OVERNIGHT EVENTS:    Patient seen and examined.    ROS: All other systems are negative.    Vital Signs:    T(F): 97.4 (24 @ 12:41), Max: 98.1 (24 @ 22:01)  HR: 60 (24 @ 12:41) (59 - 60)  BP: 127/56 (24 @ 12:41) (127/56 - 146/66)  RR: 19 (24 @ 12:41) (18 - 20)  SpO2: 95% (24 @ 08:41) (95% - 96%)  I&O's Summary    2024 07:  -  2024 07:00  --------------------------------------------------------  IN: 0 mL / OUT: 200 mL / NET: -200 mL    2024 07:01  -  2024 17:20  --------------------------------------------------------  IN: 0 mL / OUT: 950 mL / NET: -950 mL      Daily Height in cm: 152.4 (2024 22:01)    Daily Weight in k (2024 22:01)  CAPILLARY BLOOD GLUCOSE          PHYSICAL EXAM:    GENERAL:  NAD  SKIN: No rashes or lesions  HENT: Atraumatic. Normocephalic. PERRL. Moist membranes.  NECK: Supple, No JVD. No lymphadenopathy.  PULMONARY: CTA B/L. No wheezing. No rales  CVS: Normal S1, S2. Rate and Rhythm are regular. No murmurs.  ABDOMEN/GI: Soft, Nontender, Nondistended; BS present  EXTREMITIES: Peripheral pulses intact. No edema B/L LE.  NEUROLOGIC:  No motor or sensory deficit.  PSYCH: Alert & oriented x 3    Consultant(s) Notes Reviewed:  [x ] YES  [ ] NO  Care Discussed with Consultants/Other Providers [ x] YES  [ ] NO    EKG reviewed  Telemetry reviewed    LABS:                        7.6    10.69 )-----------( 275      ( 2024 05:33 )             23.9     01    138  |  94<L>  |  23<H>  ----------------------------<  113<H>  3.2<L>   |  34<H>  |  0.5<L>    Ca    7.5<L>      2024 05:33  Mg     1.6         TPro  5.2<L>  /  Alb  3.0<L>  /  TBili  0.9  /  DBili  0.4<H>  /  AST  47<H>  /  ALT  35  /  AlkPhos  138<H>      PT/INR - ( 2024 05:33 )   PT: 37.30 sec;   INR: 3.23 ratio         PTT - ( 2024 05:33 )  PTT:37.6 sec          RADIOLOGY & ADDITIONAL TESTS:    < from: Xray Chest 1 View- PORTABLE-Urgent (24 @ 21:32) >  Impression:    Low lung volume.    Bilateral opacities.    Loop recorder.    < end of copied text >  < from: CT Angio Chest PE Protocol w/ IV Cont (24 @ 01:29) >    IMPRESSION:    Bilateral areas of peritonealnodularity versus complex loculated fluid,   greatest at right anteromedial epicardial region measuring up to 5 x 2   cm. Findings could represent empyema, given report of outside diagnosis.   Consider less likely alternative possibilities of pleuraltumor versus   right middle lobe mass. Follow-up recommended.    Cardiomegaly. Small pericardial effusion. Interstitial edema. Diffuse   lung groundglass opacities, consistent with pulmonary edema in the   appropriate clinical setting. Findings compatible with CHF.    No pulmonary embolism.      < end of copied text >    Imaging or report Personally Reviewed:  [x ] YES  [ ] NO    Medications:  Standing  amLODIPine   Tablet 5 milliGRAM(s) Oral daily  atorvastatin 80 milliGRAM(s) Oral at bedtime  bisacodyl 5 milliGRAM(s) Oral at bedtime  chlorhexidine 2% Cloths 1 Application(s) Topical daily  dexAMETHasone  Injectable 6 milliGRAM(s) IV Push daily  furosemide    Tablet 40 milliGRAM(s) Oral daily  ketotifen 0.025% Ophthalmic Solution 1 Drop(s) Both EYES daily  levothyroxine 25 MICROGram(s) Oral daily  magnesium oxide 400 milliGRAM(s) Oral three times a day with meals  meropenem  IVPB 1000 milliGRAM(s) IV Intermittent every 8 hours  metoprolol succinate ER 50 milliGRAM(s) Oral daily  pantoprazole    Tablet 40 milliGRAM(s) Oral before breakfast  polyethylene glycol 3350 17 Gram(s) Oral daily  remdesivir  IVPB 100 milliGRAM(s) IV Intermittent every 24 hours  remdesivir  IVPB   IV Intermittent   valsartan 160 milliGRAM(s) Oral daily    PRN Meds  albuterol    90 MICROgram(s) HFA Inhaler 2 Puff(s) Inhalation every 6 hours PRN      Case discussed with resident    Care discussed with pt/family

## 2024-01-13 LAB
ALBUMIN SERPL ELPH-MCNC: 3.2 G/DL — LOW (ref 3.5–5.2)
ALBUMIN SERPL ELPH-MCNC: 3.2 G/DL — LOW (ref 3.5–5.2)
ALBUMIN SERPL ELPH-MCNC: 3.3 G/DL — LOW (ref 3.5–5.2)
ALBUMIN SERPL ELPH-MCNC: 3.3 G/DL — LOW (ref 3.5–5.2)
ALP SERPL-CCNC: 161 U/L — HIGH (ref 30–115)
ALP SERPL-CCNC: 161 U/L — HIGH (ref 30–115)
ALP SERPL-CCNC: 165 U/L — HIGH (ref 30–115)
ALP SERPL-CCNC: 165 U/L — HIGH (ref 30–115)
ALT FLD-CCNC: 71 U/L — HIGH (ref 0–41)
ANION GAP SERPL CALC-SCNC: 5 MMOL/L — LOW (ref 7–14)
ANION GAP SERPL CALC-SCNC: 5 MMOL/L — LOW (ref 7–14)
APTT BLD: 40.6 SEC — HIGH (ref 27–39.2)
APTT BLD: 40.6 SEC — HIGH (ref 27–39.2)
AST SERPL-CCNC: 85 U/L — HIGH (ref 0–41)
BASOPHILS # BLD AUTO: 0.01 K/UL — SIGNIFICANT CHANGE UP (ref 0–0.2)
BASOPHILS # BLD AUTO: 0.01 K/UL — SIGNIFICANT CHANGE UP (ref 0–0.2)
BASOPHILS NFR BLD AUTO: 0.1 % — SIGNIFICANT CHANGE UP (ref 0–1)
BASOPHILS NFR BLD AUTO: 0.1 % — SIGNIFICANT CHANGE UP (ref 0–1)
BILIRUB DIRECT SERPL-MCNC: 0.4 MG/DL — HIGH (ref 0–0.3)
BILIRUB DIRECT SERPL-MCNC: 0.4 MG/DL — HIGH (ref 0–0.3)
BILIRUB INDIRECT FLD-MCNC: 0.6 MG/DL — SIGNIFICANT CHANGE UP (ref 0.2–1.2)
BILIRUB INDIRECT FLD-MCNC: 0.6 MG/DL — SIGNIFICANT CHANGE UP (ref 0.2–1.2)
BILIRUB SERPL-MCNC: 1 MG/DL — SIGNIFICANT CHANGE UP (ref 0.2–1.2)
BUN SERPL-MCNC: 23 MG/DL — HIGH (ref 10–20)
BUN SERPL-MCNC: 23 MG/DL — HIGH (ref 10–20)
CALCIUM SERPL-MCNC: 7.8 MG/DL — LOW (ref 8.4–10.5)
CALCIUM SERPL-MCNC: 7.8 MG/DL — LOW (ref 8.4–10.5)
CHLORIDE SERPL-SCNC: 92 MMOL/L — LOW (ref 98–110)
CHLORIDE SERPL-SCNC: 92 MMOL/L — LOW (ref 98–110)
CO2 SERPL-SCNC: 39 MMOL/L — HIGH (ref 17–32)
CO2 SERPL-SCNC: 39 MMOL/L — HIGH (ref 17–32)
CREAT SERPL-MCNC: 0.6 MG/DL — LOW (ref 0.7–1.5)
CREAT SERPL-MCNC: 0.6 MG/DL — LOW (ref 0.7–1.5)
EGFR: 89 ML/MIN/1.73M2 — SIGNIFICANT CHANGE UP
EGFR: 89 ML/MIN/1.73M2 — SIGNIFICANT CHANGE UP
EOSINOPHIL # BLD AUTO: 0 K/UL — SIGNIFICANT CHANGE UP (ref 0–0.7)
EOSINOPHIL # BLD AUTO: 0 K/UL — SIGNIFICANT CHANGE UP (ref 0–0.7)
EOSINOPHIL NFR BLD AUTO: 0 % — SIGNIFICANT CHANGE UP (ref 0–8)
EOSINOPHIL NFR BLD AUTO: 0 % — SIGNIFICANT CHANGE UP (ref 0–8)
GLUCOSE SERPL-MCNC: 116 MG/DL — HIGH (ref 70–99)
GLUCOSE SERPL-MCNC: 116 MG/DL — HIGH (ref 70–99)
HCT VFR BLD CALC: 27.5 % — LOW (ref 37–47)
HCT VFR BLD CALC: 27.5 % — LOW (ref 37–47)
HGB BLD-MCNC: 8.6 G/DL — LOW (ref 12–16)
HGB BLD-MCNC: 8.6 G/DL — LOW (ref 12–16)
IMM GRANULOCYTES NFR BLD AUTO: 0.7 % — HIGH (ref 0.1–0.3)
IMM GRANULOCYTES NFR BLD AUTO: 0.7 % — HIGH (ref 0.1–0.3)
INR BLD: 4.59 RATIO — HIGH (ref 0.65–1.3)
INR BLD: 4.59 RATIO — HIGH (ref 0.65–1.3)
LYMPHOCYTES # BLD AUTO: 1.5 K/UL — SIGNIFICANT CHANGE UP (ref 1.2–3.4)
LYMPHOCYTES # BLD AUTO: 1.5 K/UL — SIGNIFICANT CHANGE UP (ref 1.2–3.4)
LYMPHOCYTES # BLD AUTO: 12.6 % — LOW (ref 20.5–51.1)
LYMPHOCYTES # BLD AUTO: 12.6 % — LOW (ref 20.5–51.1)
MAGNESIUM SERPL-MCNC: 2 MG/DL — SIGNIFICANT CHANGE UP (ref 1.8–2.4)
MAGNESIUM SERPL-MCNC: 2 MG/DL — SIGNIFICANT CHANGE UP (ref 1.8–2.4)
MCHC RBC-ENTMCNC: 27 PG — SIGNIFICANT CHANGE UP (ref 27–31)
MCHC RBC-ENTMCNC: 27 PG — SIGNIFICANT CHANGE UP (ref 27–31)
MCHC RBC-ENTMCNC: 31.3 G/DL — LOW (ref 32–37)
MCHC RBC-ENTMCNC: 31.3 G/DL — LOW (ref 32–37)
MCV RBC AUTO: 86.5 FL — SIGNIFICANT CHANGE UP (ref 81–99)
MCV RBC AUTO: 86.5 FL — SIGNIFICANT CHANGE UP (ref 81–99)
MONOCYTES # BLD AUTO: 1.65 K/UL — HIGH (ref 0.1–0.6)
MONOCYTES # BLD AUTO: 1.65 K/UL — HIGH (ref 0.1–0.6)
MONOCYTES NFR BLD AUTO: 13.8 % — HIGH (ref 1.7–9.3)
MONOCYTES NFR BLD AUTO: 13.8 % — HIGH (ref 1.7–9.3)
NEUTROPHILS # BLD AUTO: 8.69 K/UL — HIGH (ref 1.4–6.5)
NEUTROPHILS # BLD AUTO: 8.69 K/UL — HIGH (ref 1.4–6.5)
NEUTROPHILS NFR BLD AUTO: 72.8 % — SIGNIFICANT CHANGE UP (ref 42.2–75.2)
NEUTROPHILS NFR BLD AUTO: 72.8 % — SIGNIFICANT CHANGE UP (ref 42.2–75.2)
NRBC # BLD: 0 /100 WBCS — SIGNIFICANT CHANGE UP (ref 0–0)
NRBC # BLD: 0 /100 WBCS — SIGNIFICANT CHANGE UP (ref 0–0)
PLATELET # BLD AUTO: 320 K/UL — SIGNIFICANT CHANGE UP (ref 130–400)
PLATELET # BLD AUTO: 320 K/UL — SIGNIFICANT CHANGE UP (ref 130–400)
PMV BLD: 10.1 FL — SIGNIFICANT CHANGE UP (ref 7.4–10.4)
PMV BLD: 10.1 FL — SIGNIFICANT CHANGE UP (ref 7.4–10.4)
POTASSIUM SERPL-MCNC: 3.8 MMOL/L — SIGNIFICANT CHANGE UP (ref 3.5–5)
POTASSIUM SERPL-MCNC: 3.8 MMOL/L — SIGNIFICANT CHANGE UP (ref 3.5–5)
POTASSIUM SERPL-SCNC: 3.8 MMOL/L — SIGNIFICANT CHANGE UP (ref 3.5–5)
POTASSIUM SERPL-SCNC: 3.8 MMOL/L — SIGNIFICANT CHANGE UP (ref 3.5–5)
PROT SERPL-MCNC: 5.4 G/DL — LOW (ref 6–8)
PROT SERPL-MCNC: 5.4 G/DL — LOW (ref 6–8)
PROT SERPL-MCNC: 5.6 G/DL — LOW (ref 6–8)
PROT SERPL-MCNC: 5.6 G/DL — LOW (ref 6–8)
PROTHROM AB SERPL-ACNC: >40 SEC — HIGH (ref 9.95–12.87)
PROTHROM AB SERPL-ACNC: >40 SEC — HIGH (ref 9.95–12.87)
RBC # BLD: 3.18 M/UL — LOW (ref 4.2–5.4)
RBC # BLD: 3.18 M/UL — LOW (ref 4.2–5.4)
RBC # FLD: 15.5 % — HIGH (ref 11.5–14.5)
RBC # FLD: 15.5 % — HIGH (ref 11.5–14.5)
SODIUM SERPL-SCNC: 136 MMOL/L — SIGNIFICANT CHANGE UP (ref 135–146)
SODIUM SERPL-SCNC: 136 MMOL/L — SIGNIFICANT CHANGE UP (ref 135–146)
WBC # BLD: 11.93 K/UL — HIGH (ref 4.8–10.8)
WBC # BLD: 11.93 K/UL — HIGH (ref 4.8–10.8)
WBC # FLD AUTO: 11.93 K/UL — HIGH (ref 4.8–10.8)
WBC # FLD AUTO: 11.93 K/UL — HIGH (ref 4.8–10.8)

## 2024-01-13 PROCEDURE — 99233 SBSQ HOSP IP/OBS HIGH 50: CPT

## 2024-01-13 PROCEDURE — 71045 X-RAY EXAM CHEST 1 VIEW: CPT | Mod: 26

## 2024-01-13 RX ORDER — ACETAZOLAMIDE 250 MG/1
500 TABLET ORAL ONCE
Refills: 0 | Status: COMPLETED | OUTPATIENT
Start: 2024-01-13 | End: 2024-01-13

## 2024-01-13 RX ADMIN — REMDESIVIR 200 MILLIGRAM(S): 5 INJECTION INTRAVENOUS at 15:07

## 2024-01-13 RX ADMIN — MAGNESIUM OXIDE 400 MG ORAL TABLET 400 MILLIGRAM(S): 241.3 TABLET ORAL at 13:29

## 2024-01-13 RX ADMIN — POLYETHYLENE GLYCOL 3350 17 GRAM(S): 17 POWDER, FOR SOLUTION ORAL at 11:12

## 2024-01-13 RX ADMIN — MAGNESIUM OXIDE 400 MG ORAL TABLET 400 MILLIGRAM(S): 241.3 TABLET ORAL at 08:18

## 2024-01-13 RX ADMIN — Medication 40 MILLIGRAM(S): at 05:46

## 2024-01-13 RX ADMIN — VALSARTAN 160 MILLIGRAM(S): 80 TABLET ORAL at 05:46

## 2024-01-13 RX ADMIN — KETOTIFEN FUMARATE 1 DROP(S): 0.34 SOLUTION OPHTHALMIC at 11:12

## 2024-01-13 RX ADMIN — Medication 6 MILLIGRAM(S): at 05:46

## 2024-01-13 RX ADMIN — MEROPENEM 100 MILLIGRAM(S): 1 INJECTION INTRAVENOUS at 05:46

## 2024-01-13 RX ADMIN — PANTOPRAZOLE SODIUM 40 MILLIGRAM(S): 20 TABLET, DELAYED RELEASE ORAL at 06:08

## 2024-01-13 RX ADMIN — Medication 5 MILLIGRAM(S): at 22:25

## 2024-01-13 RX ADMIN — MAGNESIUM OXIDE 400 MG ORAL TABLET 400 MILLIGRAM(S): 241.3 TABLET ORAL at 17:30

## 2024-01-13 RX ADMIN — ACETAZOLAMIDE 110 MILLIGRAM(S): 250 TABLET ORAL at 17:30

## 2024-01-13 RX ADMIN — ATORVASTATIN CALCIUM 80 MILLIGRAM(S): 80 TABLET, FILM COATED ORAL at 22:26

## 2024-01-13 RX ADMIN — MEROPENEM 100 MILLIGRAM(S): 1 INJECTION INTRAVENOUS at 13:30

## 2024-01-13 RX ADMIN — MEROPENEM 100 MILLIGRAM(S): 1 INJECTION INTRAVENOUS at 22:26

## 2024-01-13 RX ADMIN — Medication 50 MILLIGRAM(S): at 05:46

## 2024-01-13 RX ADMIN — CHLORHEXIDINE GLUCONATE 1 APPLICATION(S): 213 SOLUTION TOPICAL at 11:13

## 2024-01-13 RX ADMIN — AMLODIPINE BESYLATE 5 MILLIGRAM(S): 2.5 TABLET ORAL at 05:46

## 2024-01-13 RX ADMIN — Medication 25 MICROGRAM(S): at 05:46

## 2024-01-13 NOTE — PROGRESS NOTE ADULT - SUBJECTIVE AND OBJECTIVE BOX
SORAYA KING  83y Female    CHIEF COMPLAINT:    Patient is a 83y old  Female who presents with a chief complaint of 83F admit w/ Acute Respiratory Failure with Hypoxia (10 Felipe 2024 20:16)      INTERVAL HPI/OVERNIGHT EVENTS:    Patient seen and examined. Sitting in a chair. On 2L NC. RA POX was 95%. No cough or fever     ROS: All other systems are negative.    Vital Signs:    T(F): 96.9 (24 @ 04:43), Max: 97.6 (24 @ 19:44)  HR: 60 (24 @ 04:43) (60 - 81)  BP: 155/72 (24 @ 04:43) (119/56 - 155/72)  RR: 18 (24 @ 13:06) (18 - 18)  SpO2: 99% (24 @ 13:06) (95% - 99%)  I&O's Summary    2024 07:01  -  2024 07:00  --------------------------------------------------------  IN: 0 mL / OUT: 2050 mL / NET: -2050 mL    2024 07:01  -  2024 14:25  --------------------------------------------------------  IN: 240 mL / OUT: 1001 mL / NET: -761 mL      Daily     Daily Weight in k.5 (2024 04:43)  CAPILLARY BLOOD GLUCOSE          PHYSICAL EXAM:    GENERAL:  NAD  SKIN: No rashes or lesions  HENT: Atraumatic. Normocephalic. PERRL. Moist membranes.  NECK: Supple, No JVD. No lymphadenopathy.  PULMONARY: Decreased BS in the bases B/L. No wheezing. No rales  CVS: Normal S1, S2. Rate and Rhythm are regular. No murmurs.  ABDOMEN/GI: Soft, Nontender, Nondistended; BS present  EXTREMITIES: Peripheral pulses intact. No edema B/L LE.  NEUROLOGIC:  No motor or sensory deficit.  PSYCH: Alert & oriented x 3    Consultant(s) Notes Reviewed:  [x ] YES  [ ] NO  Care Discussed with Consultants/Other Providers [ x] YES  [ ] NO    EKG reviewed  Telemetry reviewed    LABS:                        8.6    11.93 )-----------( 320      ( 2024 06:01 )             27.5         136  |  92<L>  |  23<H>  ----------------------------<  116<H>  3.8   |  39<H>  |  0.6<L>    Ca    7.8<L>      2024 06:01  Mg     2.0         TPro  5.4<L>  /  Alb  3.3<L>  /  TBili  1.0  /  DBili  0.4<H>  /  AST  85<H>  /  ALT  71<H>  /  AlkPhos  161<H>      PT/INR - ( 2024 06:01 )   PT: >40.00 sec;   INR: 4.59 ratio         PTT - ( 2024 06:01 )  PTT:40.6 sec          RADIOLOGY & ADDITIONAL TESTS:      Imaging or report Personally Reviewed:  [ ] YES  [ ] NO    Medications:  Standing  amLODIPine   Tablet 5 milliGRAM(s) Oral daily  atorvastatin 80 milliGRAM(s) Oral at bedtime  bisacodyl 5 milliGRAM(s) Oral at bedtime  chlorhexidine 2% Cloths 1 Application(s) Topical daily  dexAMETHasone  Injectable 6 milliGRAM(s) IV Push daily  furosemide   Injectable 40 milliGRAM(s) IV Push daily  ketotifen 0.025% Ophthalmic Solution 1 Drop(s) Both EYES daily  levothyroxine 25 MICROGram(s) Oral daily  magnesium oxide 400 milliGRAM(s) Oral three times a day with meals  meropenem  IVPB 1000 milliGRAM(s) IV Intermittent every 8 hours  metoprolol succinate ER 50 milliGRAM(s) Oral daily  pantoprazole    Tablet 40 milliGRAM(s) Oral before breakfast  polyethylene glycol 3350 17 Gram(s) Oral daily  remdesivir  IVPB   IV Intermittent   remdesivir  IVPB 100 milliGRAM(s) IV Intermittent every 24 hours  valsartan 160 milliGRAM(s) Oral daily    PRN Meds  albuterol    90 MICROgram(s) HFA Inhaler 2 Puff(s) Inhalation every 6 hours PRN      Case discussed with resident    Care discussed with pt/family

## 2024-01-13 NOTE — PROGRESS NOTE ADULT - ASSESSMENT
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   Pt was sent to the ED today by PCP as her INR was 8. She was also hypoxic on ambulation to 80s.       Acute HFpEF  Covid-19 infection  S/P recent MV replacement (bioprosthetic) + TV annuloplasty  Complete heart block s/p Micra  H/O recent ESBL Klebsiella bacteremia   Supratherapeutic INR  Transaminitis                  PLAN:     ·	Tele reviewed. No events.   ·	CTA chest noted. No acute PE.   ·	Lasix 40 mg iv daily.   ·	CO2 and BUN are trending up. Will give one dose of Acetazolamide 500 mg IV.   ·	Check i's and o's and daily wt  ·	Low salt diet and water restriction to 1.5 L/D  ·	Monitor electrolytes and renal function. Replete K and Mg.   ·	ID eval noted. Recommended IV RDV for 5 days  and dexamethasone 6 mg iv q24h for 10 days. If discharged earlier then d/c steroids.   ·	H/O ESBL Klebsiella bacteremia. ID recommended Meropenem 1 gm iv q8h for now and on discharge Ertapenem 1 gm iv q24h with  end date 4 weeks from 12/28 and to be f/u by ID at [facility].   ·	INR is 4.59. Will hold Coumadin today and check INR in AM  ·	LFT'S noted. Trending up. Likely due to RDV. Last dose today. 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, c/b CHB s/p Micra opn 12/17, R thoracentesis for pleural effusion on 12/18/2023, , readmission at [facility] for MDR/ESBL Klebsiella bacteremia, positive urine and sputum cultures, s/p repeat R thoracentesis , possible infected pericardial effusion, discharged on 12/28 with PICC line for 4 weeks of IV ertapenem.   Pt was sent to the ED today by PCP as her INR was 8. She was also hypoxic on ambulation to 80s.       Acute HFpEF  Covid-19 infection  S/P recent MV replacement (bioprosthetic) + TV annuloplasty  Complete heart block s/p Micra  H/O recent ESBL Klebsiella bacteremia   Supratherapeutic INR  Transaminitis                  PLAN:     ·	Tele reviewed. No events.   ·	CTA chest noted. No acute PE.   ·	Lasix 40 mg iv daily.   ·	CO2 and BUN are trending up. Will give one dose of Acetazolamide 500 mg IV.   ·	Check i's and o's and daily wt  ·	Low salt diet and water restriction to 1.5 L/D  ·	Monitor electrolytes and renal function. Replete K and Mg.   ·	ID eval noted. Recommended IV RDV for 5 days  and dexamethasone 6 mg iv q24h for 10 days. If discharged earlier then d/c steroids.   ·	H/O ESBL Klebsiella bacteremia. ID recommended Meropenem 1 gm iv q8h for now and on discharge Ertapenem 1 gm iv q24h with  end date 4 weeks from 12/28 and to be f/u by ID at [facility].   ·	INR is 4.59. Will hold Coumadin today and check INR in AM  ·	LFT'S noted. Trending up. Likely due to RDV. Last dose today. Follow LFT'S    Progress Note Handoff    Pending (specify):  Consults_________, Tests________, Test Results_______, Other__Fluid overload, Covid-19 infection_______  Family discussion:  Disposition: Home___/SNF___/Other________/Unknown at this time________    Armando Pinto MD  Spectra: [phone]

## 2024-01-14 LAB
ALBUMIN SERPL ELPH-MCNC: 3.1 G/DL — LOW (ref 3.5–5.2)
ALBUMIN SERPL ELPH-MCNC: 3.1 G/DL — LOW (ref 3.5–5.2)
ALBUMIN SERPL ELPH-MCNC: 3.2 G/DL — LOW (ref 3.5–5.2)
ALBUMIN SERPL ELPH-MCNC: 3.2 G/DL — LOW (ref 3.5–5.2)
ALP SERPL-CCNC: 165 U/L — HIGH (ref 30–115)
ALP SERPL-CCNC: 165 U/L — HIGH (ref 30–115)
ALP SERPL-CCNC: 167 U/L — HIGH (ref 30–115)
ALP SERPL-CCNC: 167 U/L — HIGH (ref 30–115)
ALT FLD-CCNC: 71 U/L — HIGH (ref 0–41)
ANION GAP SERPL CALC-SCNC: 7 MMOL/L — SIGNIFICANT CHANGE UP (ref 7–14)
ANION GAP SERPL CALC-SCNC: 7 MMOL/L — SIGNIFICANT CHANGE UP (ref 7–14)
APTT BLD: 46 SEC — HIGH (ref 27–39.2)
APTT BLD: 46 SEC — HIGH (ref 27–39.2)
AST SERPL-CCNC: 68 U/L — HIGH (ref 0–41)
BASOPHILS # BLD AUTO: 0.01 K/UL — SIGNIFICANT CHANGE UP (ref 0–0.2)
BASOPHILS # BLD AUTO: 0.01 K/UL — SIGNIFICANT CHANGE UP (ref 0–0.2)
BASOPHILS NFR BLD AUTO: 0.1 % — SIGNIFICANT CHANGE UP (ref 0–1)
BASOPHILS NFR BLD AUTO: 0.1 % — SIGNIFICANT CHANGE UP (ref 0–1)
BILIRUB DIRECT SERPL-MCNC: 0.3 MG/DL — SIGNIFICANT CHANGE UP (ref 0–0.3)
BILIRUB DIRECT SERPL-MCNC: 0.3 MG/DL — SIGNIFICANT CHANGE UP (ref 0–0.3)
BILIRUB INDIRECT FLD-MCNC: 0.7 MG/DL — SIGNIFICANT CHANGE UP (ref 0.2–1.2)
BILIRUB INDIRECT FLD-MCNC: 0.7 MG/DL — SIGNIFICANT CHANGE UP (ref 0.2–1.2)
BILIRUB SERPL-MCNC: 1 MG/DL — SIGNIFICANT CHANGE UP (ref 0.2–1.2)
BUN SERPL-MCNC: 26 MG/DL — HIGH (ref 10–20)
BUN SERPL-MCNC: 26 MG/DL — HIGH (ref 10–20)
CALCIUM SERPL-MCNC: 8.4 MG/DL — SIGNIFICANT CHANGE UP (ref 8.4–10.5)
CALCIUM SERPL-MCNC: 8.4 MG/DL — SIGNIFICANT CHANGE UP (ref 8.4–10.5)
CHLORIDE SERPL-SCNC: 95 MMOL/L — LOW (ref 98–110)
CHLORIDE SERPL-SCNC: 95 MMOL/L — LOW (ref 98–110)
CO2 SERPL-SCNC: 34 MMOL/L — HIGH (ref 17–32)
CO2 SERPL-SCNC: 34 MMOL/L — HIGH (ref 17–32)
CREAT SERPL-MCNC: 0.6 MG/DL — LOW (ref 0.7–1.5)
CREAT SERPL-MCNC: 0.6 MG/DL — LOW (ref 0.7–1.5)
CULTURE RESULTS: SIGNIFICANT CHANGE UP
EGFR: 89 ML/MIN/1.73M2 — SIGNIFICANT CHANGE UP
EGFR: 89 ML/MIN/1.73M2 — SIGNIFICANT CHANGE UP
EOSINOPHIL # BLD AUTO: 0.01 K/UL — SIGNIFICANT CHANGE UP (ref 0–0.7)
EOSINOPHIL # BLD AUTO: 0.01 K/UL — SIGNIFICANT CHANGE UP (ref 0–0.7)
EOSINOPHIL NFR BLD AUTO: 0.1 % — SIGNIFICANT CHANGE UP (ref 0–8)
EOSINOPHIL NFR BLD AUTO: 0.1 % — SIGNIFICANT CHANGE UP (ref 0–8)
GLUCOSE SERPL-MCNC: 112 MG/DL — HIGH (ref 70–99)
GLUCOSE SERPL-MCNC: 112 MG/DL — HIGH (ref 70–99)
HCT VFR BLD CALC: 29.5 % — LOW (ref 37–47)
HCT VFR BLD CALC: 29.5 % — LOW (ref 37–47)
HGB BLD-MCNC: 9.2 G/DL — LOW (ref 12–16)
HGB BLD-MCNC: 9.2 G/DL — LOW (ref 12–16)
IMM GRANULOCYTES NFR BLD AUTO: 1.1 % — HIGH (ref 0.1–0.3)
IMM GRANULOCYTES NFR BLD AUTO: 1.1 % — HIGH (ref 0.1–0.3)
INR BLD: 5.48 RATIO — CRITICAL HIGH (ref 0.65–1.3)
INR BLD: 5.48 RATIO — CRITICAL HIGH (ref 0.65–1.3)
LYMPHOCYTES # BLD AUTO: 1.53 K/UL — SIGNIFICANT CHANGE UP (ref 1.2–3.4)
LYMPHOCYTES # BLD AUTO: 1.53 K/UL — SIGNIFICANT CHANGE UP (ref 1.2–3.4)
LYMPHOCYTES # BLD AUTO: 11.9 % — LOW (ref 20.5–51.1)
LYMPHOCYTES # BLD AUTO: 11.9 % — LOW (ref 20.5–51.1)
MAGNESIUM SERPL-MCNC: 1.9 MG/DL — SIGNIFICANT CHANGE UP (ref 1.8–2.4)
MAGNESIUM SERPL-MCNC: 1.9 MG/DL — SIGNIFICANT CHANGE UP (ref 1.8–2.4)
MCHC RBC-ENTMCNC: 26.8 PG — LOW (ref 27–31)
MCHC RBC-ENTMCNC: 26.8 PG — LOW (ref 27–31)
MCHC RBC-ENTMCNC: 31.2 G/DL — LOW (ref 32–37)
MCHC RBC-ENTMCNC: 31.2 G/DL — LOW (ref 32–37)
MCV RBC AUTO: 86 FL — SIGNIFICANT CHANGE UP (ref 81–99)
MCV RBC AUTO: 86 FL — SIGNIFICANT CHANGE UP (ref 81–99)
MONOCYTES # BLD AUTO: 1.71 K/UL — HIGH (ref 0.1–0.6)
MONOCYTES # BLD AUTO: 1.71 K/UL — HIGH (ref 0.1–0.6)
MONOCYTES NFR BLD AUTO: 13.3 % — HIGH (ref 1.7–9.3)
MONOCYTES NFR BLD AUTO: 13.3 % — HIGH (ref 1.7–9.3)
NEUTROPHILS # BLD AUTO: 9.44 K/UL — HIGH (ref 1.4–6.5)
NEUTROPHILS # BLD AUTO: 9.44 K/UL — HIGH (ref 1.4–6.5)
NEUTROPHILS NFR BLD AUTO: 73.5 % — SIGNIFICANT CHANGE UP (ref 42.2–75.2)
NEUTROPHILS NFR BLD AUTO: 73.5 % — SIGNIFICANT CHANGE UP (ref 42.2–75.2)
NRBC # BLD: 0 /100 WBCS — SIGNIFICANT CHANGE UP (ref 0–0)
NRBC # BLD: 0 /100 WBCS — SIGNIFICANT CHANGE UP (ref 0–0)
PLATELET # BLD AUTO: 352 K/UL — SIGNIFICANT CHANGE UP (ref 130–400)
PLATELET # BLD AUTO: 352 K/UL — SIGNIFICANT CHANGE UP (ref 130–400)
PMV BLD: 10.3 FL — SIGNIFICANT CHANGE UP (ref 7.4–10.4)
PMV BLD: 10.3 FL — SIGNIFICANT CHANGE UP (ref 7.4–10.4)
POTASSIUM SERPL-MCNC: 3.5 MMOL/L — SIGNIFICANT CHANGE UP (ref 3.5–5)
POTASSIUM SERPL-MCNC: 3.5 MMOL/L — SIGNIFICANT CHANGE UP (ref 3.5–5)
POTASSIUM SERPL-SCNC: 3.5 MMOL/L — SIGNIFICANT CHANGE UP (ref 3.5–5)
POTASSIUM SERPL-SCNC: 3.5 MMOL/L — SIGNIFICANT CHANGE UP (ref 3.5–5)
PROT SERPL-MCNC: 5.7 G/DL — LOW (ref 6–8)
PROTHROM AB SERPL-ACNC: >40 SEC — HIGH (ref 9.95–12.87)
PROTHROM AB SERPL-ACNC: >40 SEC — HIGH (ref 9.95–12.87)
RBC # BLD: 3.43 M/UL — LOW (ref 4.2–5.4)
RBC # BLD: 3.43 M/UL — LOW (ref 4.2–5.4)
RBC # FLD: 15.7 % — HIGH (ref 11.5–14.5)
RBC # FLD: 15.7 % — HIGH (ref 11.5–14.5)
SODIUM SERPL-SCNC: 136 MMOL/L — SIGNIFICANT CHANGE UP (ref 135–146)
SODIUM SERPL-SCNC: 136 MMOL/L — SIGNIFICANT CHANGE UP (ref 135–146)
SPECIMEN SOURCE: SIGNIFICANT CHANGE UP
WBC # BLD: 12.84 K/UL — HIGH (ref 4.8–10.8)
WBC # BLD: 12.84 K/UL — HIGH (ref 4.8–10.8)
WBC # FLD AUTO: 12.84 K/UL — HIGH (ref 4.8–10.8)
WBC # FLD AUTO: 12.84 K/UL — HIGH (ref 4.8–10.8)

## 2024-01-14 PROCEDURE — 99232 SBSQ HOSP IP/OBS MODERATE 35: CPT

## 2024-01-14 RX ADMIN — MAGNESIUM OXIDE 400 MG ORAL TABLET 400 MILLIGRAM(S): 241.3 TABLET ORAL at 12:57

## 2024-01-14 RX ADMIN — MEROPENEM 100 MILLIGRAM(S): 1 INJECTION INTRAVENOUS at 23:22

## 2024-01-14 RX ADMIN — MAGNESIUM OXIDE 400 MG ORAL TABLET 400 MILLIGRAM(S): 241.3 TABLET ORAL at 17:52

## 2024-01-14 RX ADMIN — CHLORHEXIDINE GLUCONATE 1 APPLICATION(S): 213 SOLUTION TOPICAL at 13:48

## 2024-01-14 RX ADMIN — Medication 50 MILLIGRAM(S): at 05:22

## 2024-01-14 RX ADMIN — Medication 5 MILLIGRAM(S): at 23:17

## 2024-01-14 RX ADMIN — VALSARTAN 160 MILLIGRAM(S): 80 TABLET ORAL at 05:22

## 2024-01-14 RX ADMIN — AMLODIPINE BESYLATE 5 MILLIGRAM(S): 2.5 TABLET ORAL at 05:22

## 2024-01-14 RX ADMIN — POLYETHYLENE GLYCOL 3350 17 GRAM(S): 17 POWDER, FOR SOLUTION ORAL at 12:56

## 2024-01-14 RX ADMIN — MEROPENEM 100 MILLIGRAM(S): 1 INJECTION INTRAVENOUS at 13:48

## 2024-01-14 RX ADMIN — PANTOPRAZOLE SODIUM 40 MILLIGRAM(S): 20 TABLET, DELAYED RELEASE ORAL at 05:23

## 2024-01-14 RX ADMIN — MAGNESIUM OXIDE 400 MG ORAL TABLET 400 MILLIGRAM(S): 241.3 TABLET ORAL at 09:05

## 2024-01-14 RX ADMIN — KETOTIFEN FUMARATE 1 DROP(S): 0.34 SOLUTION OPHTHALMIC at 12:57

## 2024-01-14 RX ADMIN — MEROPENEM 100 MILLIGRAM(S): 1 INJECTION INTRAVENOUS at 05:21

## 2024-01-14 RX ADMIN — Medication 40 MILLIGRAM(S): at 05:21

## 2024-01-14 RX ADMIN — Medication 25 MICROGRAM(S): at 05:22

## 2024-01-14 RX ADMIN — ATORVASTATIN CALCIUM 80 MILLIGRAM(S): 80 TABLET, FILM COATED ORAL at 23:16

## 2024-01-14 RX ADMIN — Medication 6 MILLIGRAM(S): at 05:21

## 2024-01-14 NOTE — PROGRESS NOTE ADULT - SUBJECTIVE AND OBJECTIVE BOX
SORAYA KING  83y Female    CHIEF COMPLAINT:    Patient is a 83y old  Female who presents with a chief complaint of 83F admit w/ Acute Respiratory Failure with Hypoxia (10 Felipe 2024 20:16)      INTERVAL HPI/OVERNIGHT EVENTS:    Patient seen and examined.    ROS: All other systems are negative.    Vital Signs:    T(F): 95.5 (24 @ 05:04), Max: 97.2 (24 @ 20:30)  HR: 85 (24 @ 05:04) (85 - 97)  BP: 121/60 (24 @ 05:04) (121/60 - 142/67)  RR: 18 (24 @ 05:04) (18 - 18)  SpO2: 99% (24 @ 13:06) (95% - 99%)  I&O's Summary    2024 07:01  -  2024 07:00  --------------------------------------------------------  IN: 240 mL / OUT: 1001 mL / NET: -761 mL      Daily     Daily Weight in k (2024 05:04)  CAPILLARY BLOOD GLUCOSE          PHYSICAL EXAM:    GENERAL:  NAD  SKIN: No rashes or lesions  HENT: Atraumatic. Normocephalic. PERRL. Moist membranes.  NECK: Supple, No JVD. No lymphadenopathy.  PULMONARY: CTA B/L. No wheezing. No rales  CVS: Normal S1, S2. Rate and Rhythm are regular. No murmurs.  ABDOMEN/GI: Soft, Nontender, Nondistended; BS present  EXTREMITIES: Peripheral pulses intact. No edema B/L LE.  NEUROLOGIC:  No motor or sensory deficit.  PSYCH: Alert & oriented x 3    Consultant(s) Notes Reviewed:  [x ] YES  [ ] NO  Care Discussed with Consultants/Other Providers [ x] YES  [ ] NO    EKG reviewed  Telemetry reviewed    LABS:                        9.2    12.84 )-----------( 352      ( 2024 06:22 )             29.5         136  |  95<L>  |  26<H>  ----------------------------<  112<H>  3.5   |  34<H>  |  0.6<L>    Ca    8.4      2024 06:22  Mg     1.9         TPro  5.7<L>  /  Alb  3.1<L>  /  TBili  1.0  /  DBili  0.3  /  AST  68<H>  /  ALT  71<H>  /  AlkPhos  167<H>      PT/INR - ( 2024 06:22 )   PT: >40.00 sec;   INR: 5.48 ratio         PTT - ( 2024 06:22 )  PTT:46.0 sec          RADIOLOGY & ADDITIONAL TESTS:      Imaging or report Personally Reviewed:  [ ] YES  [ ] NO    Medications:  Standing  amLODIPine   Tablet 5 milliGRAM(s) Oral daily  atorvastatin 80 milliGRAM(s) Oral at bedtime  bisacodyl 5 milliGRAM(s) Oral at bedtime  chlorhexidine 2% Cloths 1 Application(s) Topical daily  dexAMETHasone  Injectable 6 milliGRAM(s) IV Push daily  furosemide   Injectable 40 milliGRAM(s) IV Push daily  ketotifen 0.025% Ophthalmic Solution 1 Drop(s) Both EYES daily  levothyroxine 25 MICROGram(s) Oral daily  magnesium oxide 400 milliGRAM(s) Oral three times a day with meals  meropenem  IVPB 1000 milliGRAM(s) IV Intermittent every 8 hours  metoprolol succinate ER 50 milliGRAM(s) Oral daily  pantoprazole    Tablet 40 milliGRAM(s) Oral before breakfast  polyethylene glycol 3350 17 Gram(s) Oral daily  valsartan 160 milliGRAM(s) Oral daily    PRN Meds  albuterol    90 MICROgram(s) HFA Inhaler 2 Puff(s) Inhalation every 6 hours PRN      Case discussed with resident    Care discussed with pt/family           SORAYA KING  83y Female    CHIEF COMPLAINT:    Patient is a 83y old  Female who presents with a chief complaint of 83F admit w/ Acute Respiratory Failure with Hypoxia (10 Felipe 2024 20:16)      INTERVAL HPI/OVERNIGHT EVENTS:    Patient seen and examined. Sitting in a chair. No sob. No cough. No other compalint    ROS: All other systems are negative.    Vital Signs:    T(F): 95.5 (24 @ 05:04), Max: 97.2 (24 @ 20:30)  HR: 85 (24 @ 05:04) (85 - 97)  BP: 121/60 (24 @ 05:04) (121/60 - 142/67)  RR: 18 (24 @ 05:04) (18 - 18)  SpO2: 99% (24 @ 13:06) (95% - 99%)  I&O's Summary    2024 07:01  -  2024 07:00  --------------------------------------------------------  IN: 240 mL / OUT: 1001 mL / NET: -761 mL      Daily     Daily Weight in k (2024 05:04)  CAPILLARY BLOOD GLUCOSE          PHYSICAL EXAM:    GENERAL:  NAD  SKIN: No rashes or lesions  HENT: Atraumatic. Normocephalic. PERRL. Moist membranes.  NECK: Supple, No JVD. No lymphadenopathy.  PULMONARY: CTA B/L. No wheezing. No rales  CVS: Normal S1, S2. Rate and Rhythm are regular. No murmurs.  ABDOMEN/GI: Soft, Nontender, Nondistended; BS present  EXTREMITIES: Peripheral pulses intact. No edema B/L LE.  NEUROLOGIC:  No motor or sensory deficit.  PSYCH: Alert & oriented x 3    Consultant(s) Notes Reviewed:  [x ] YES  [ ] NO  Care Discussed with Consultants/Other Providers [ x] YES  [ ] NO    EKG reviewed  Telemetry reviewed    LABS:                        9.2    12.84 )-----------( 352      ( 2024 06:22 )             29.5         136  |  95<L>  |  26<H>  ----------------------------<  112<H>  3.5   |  34<H>  |  0.6<L>    Ca    8.4      2024 06:22  Mg     1.9         TPro  5.7<L>  /  Alb  3.1<L>  /  TBili  1.0  /  DBili  0.3  /  AST  68<H>  /  ALT  71<H>  /  AlkPhos  167<H>      PT/INR - ( 2024 06:22 )   PT: >40.00 sec;   INR: 5.48 ratio         PTT - ( 2024 06:22 )  PTT:46.0 sec          RADIOLOGY & ADDITIONAL TESTS:      Imaging or report Personally Reviewed:  [ ] YES  [ ] NO    Medications:  Standing  amLODIPine   Tablet 5 milliGRAM(s) Oral daily  atorvastatin 80 milliGRAM(s) Oral at bedtime  bisacodyl 5 milliGRAM(s) Oral at bedtime  chlorhexidine 2% Cloths 1 Application(s) Topical daily  dexAMETHasone  Injectable 6 milliGRAM(s) IV Push daily  furosemide   Injectable 40 milliGRAM(s) IV Push daily  ketotifen 0.025% Ophthalmic Solution 1 Drop(s) Both EYES daily  levothyroxine 25 MICROGram(s) Oral daily  magnesium oxide 400 milliGRAM(s) Oral three times a day with meals  meropenem  IVPB 1000 milliGRAM(s) IV Intermittent every 8 hours  metoprolol succinate ER 50 milliGRAM(s) Oral daily  pantoprazole    Tablet 40 milliGRAM(s) Oral before breakfast  polyethylene glycol 3350 17 Gram(s) Oral daily  valsartan 160 milliGRAM(s) Oral daily    PRN Meds  albuterol    90 MICROgram(s) HFA Inhaler 2 Puff(s) Inhalation every 6 hours PRN      Case discussed with resident    Care discussed with pt/family

## 2024-01-14 NOTE — PROGRESS NOTE ADULT - ASSESSMENT
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   Pt was sent to the ED today by PCP as her INR was 8. She was also hypoxic on ambulation to 80s.       Acute HFpEF  Covid-19 infection  S/P recent MV replacement (bioprosthetic) + TV annuloplasty  Complete heart block s/p Micra  H/O recent ESBL Klebsiella bacteremia   Supratherapeutic INR  Transaminitis                  PLAN:     ·	Tele reviewed. No events.   ·	CTA chest noted. No acute PE.   ·	Lasix 40 mg iv daily.   ·	CO2 and BUN are trending up. Will give one dose of Acetazolamide 500 mg IV.   ·	Check i's and o's and daily wt  ·	Low salt diet and water restriction to 1.5 L/D  ·	Monitor electrolytes and renal function. Replete K and Mg.   ·	ID eval noted. Recommended IV RDV for 5 days  and dexamethasone 6 mg iv q24h for 10 days. If discharged earlier then d/c steroids.   ·	H/O ESBL Klebsiella bacteremia. ID recommended Meropenem 1 gm iv q8h for now and on discharge Ertapenem 1 gm iv q24h with  end date 4 weeks from 12/28 and to be f/u by ID at [facility].   ·	INR is 5.48. Will hold Coumadin today and check INR in AM  ·	LFT'S noted. Trending down today. Likely due to RDV. Last dose was yesterday. 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   Pt was sent to the ED today by PCP as her INR was 8. She was also hypoxic on ambulation to 80s.       Acute HFpEF  Covid-19 infection  S/P recent MV replacement (bioprosthetic) + TV annuloplasty  Complete heart block s/p Micra  H/O recent ESBL Klebsiella bacteremia   Supratherapeutic INR  Transaminitis                  PLAN:     ·	Tele reviewed. No events.   ·	CTA chest noted. No acute PE.   ·	Lasix 40 mg iv daily.   ·	CO2 and BUN are trending up. Will give one dose of Acetazolamide 500 mg IV.   ·	Check i's and o's and daily wt  ·	Low salt diet and water restriction to 1.5 L/D  ·	Monitor electrolytes and renal function. Replete K and Mg.   ·	ID eval noted. Recommended IV RDV for 5 days  and dexamethasone 6 mg iv q24h for 10 days. If discharged earlier then d/c steroids.   ·	H/O ESBL Klebsiella bacteremia. ID recommended Meropenem 1 gm iv q8h for now and on discharge Ertapenem 1 gm iv q24h with  end date 4 weeks from 12/28 and to be f/u by ID at [facility].   ·	INR is 5.48. Will hold Coumadin today and check INR in AM  ·	LFT'S noted. Trending down today. Likely due to RDV. Last dose was yesterday. 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   Pt was sent to the ED today by PCP as her INR was 8. She was also hypoxic on ambulation to 80s.       Acute HFpEF  Covid-19 infection  S/P recent MV replacement (bioprosthetic) + TV annuloplasty  Complete heart block s/p Micra  H/O recent ESBL Klebsiella bacteremia   Supratherapeutic INR  Transaminitis                  PLAN:     ·	Tele reviewed. No events.   ·	CTA chest noted. No acute PE.   ·	D/C Lasix. Switch her to Torsemide 20 mg po daily  ·	Check i's and o's and daily wt  ·	Low salt diet and water restriction to 1.5 L/D  ·	Monitor electrolytes and renal function.   ·	ID eval noted. Recommended IV RDV for 5 days  and dexamethasone 6 mg iv q24h for 10 days. If discharged earlier then d/c steroids. Completed RDV yesterday  ·	H/O ESBL Klebsiella bacteremia. ID recommended Meropenem 1 gm iv q8h for now and on discharge Ertapenem 1 gm iv q24h with  end date 4 weeks from 12/28 and to be f/u by ID at [facility].   ·	INR is 5.48. Will hold Coumadin today and check INR in AM  ·	LFT'S noted. Trending down today. Likely due to RDV. Last dose was yesterday. Follow LFT'S  ·	PT eval noted. Family wants her to go to SNF.     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   Pt was sent to the ED today by PCP as her INR was 8. She was also hypoxic on ambulation to 80s.       Acute HFpEF  Covid-19 infection  S/P recent MV replacement (bioprosthetic) + TV annuloplasty  Complete heart block s/p Micra  H/O recent ESBL Klebsiella bacteremia   Supratherapeutic INR  Transaminitis                  PLAN:     ·	Tele reviewed. No events.   ·	CTA chest noted. No acute PE.   ·	D/C Lasix. Switch her to Torsemide 20 mg po daily  ·	Check i's and o's and daily wt  ·	Low salt diet and water restriction to 1.5 L/D  ·	Monitor electrolytes and renal function.   ·	ID eval noted. Recommended IV RDV for 5 days  and dexamethasone 6 mg iv q24h for 10 days. If discharged earlier then d/c steroids. Completed RDV yesterday  ·	H/O ESBL Klebsiella bacteremia. ID recommended Meropenem 1 gm iv q8h for now and on discharge Ertapenem 1 gm iv q24h with  end date 4 weeks from 12/28 and to be f/u by ID at [facility].   ·	INR is 5.48. Will hold Coumadin today and check INR in AM  ·	LFT'S noted. Trending down today. Likely due to RDV. Last dose was yesterday. Follow LFT'S  ·	PT eval noted. Family wants her to go to SNF.     Progress Note Handoff    Pending (specify):  Consults_________, Tests________, Test Results_______, Other__Social services for discharge planning_______  Family discussion: With the daughter about the diagnosis and plan of care  Disposition: Home___/SNF___/Other________/Unknown at this time________    Armando Pinto MD  Spectra: [phone]

## 2024-01-15 LAB
ALBUMIN SERPL ELPH-MCNC: 3.3 G/DL — LOW (ref 3.5–5.2)
ALBUMIN SERPL ELPH-MCNC: 3.3 G/DL — LOW (ref 3.5–5.2)
ALBUMIN SERPL ELPH-MCNC: 3.4 G/DL — LOW (ref 3.5–5.2)
ALBUMIN SERPL ELPH-MCNC: 3.4 G/DL — LOW (ref 3.5–5.2)
ALP SERPL-CCNC: 176 U/L — HIGH (ref 30–115)
ALT FLD-CCNC: 78 U/L — HIGH (ref 0–41)
ALT FLD-CCNC: 78 U/L — HIGH (ref 0–41)
ALT FLD-CCNC: 79 U/L — HIGH (ref 0–41)
ALT FLD-CCNC: 79 U/L — HIGH (ref 0–41)
ANION GAP SERPL CALC-SCNC: 10 MMOL/L — SIGNIFICANT CHANGE UP (ref 7–14)
ANION GAP SERPL CALC-SCNC: 10 MMOL/L — SIGNIFICANT CHANGE UP (ref 7–14)
ANION GAP SERPL CALC-SCNC: 11 MMOL/L — SIGNIFICANT CHANGE UP (ref 7–14)
APTT BLD: 42.8 SEC — HIGH (ref 27–39.2)
APTT BLD: 42.8 SEC — HIGH (ref 27–39.2)
AST SERPL-CCNC: 71 U/L — HIGH (ref 0–41)
AST SERPL-CCNC: 71 U/L — HIGH (ref 0–41)
AST SERPL-CCNC: 82 U/L — HIGH (ref 0–41)
AST SERPL-CCNC: 82 U/L — HIGH (ref 0–41)
BASOPHILS # BLD AUTO: 0.02 K/UL — SIGNIFICANT CHANGE UP (ref 0–0.2)
BASOPHILS # BLD AUTO: 0.02 K/UL — SIGNIFICANT CHANGE UP (ref 0–0.2)
BASOPHILS NFR BLD AUTO: 0.1 % — SIGNIFICANT CHANGE UP (ref 0–1)
BASOPHILS NFR BLD AUTO: 0.1 % — SIGNIFICANT CHANGE UP (ref 0–1)
BILIRUB DIRECT SERPL-MCNC: 0.4 MG/DL — HIGH (ref 0–0.3)
BILIRUB DIRECT SERPL-MCNC: 0.4 MG/DL — HIGH (ref 0–0.3)
BILIRUB INDIRECT FLD-MCNC: 0.7 MG/DL — SIGNIFICANT CHANGE UP (ref 0.2–1.2)
BILIRUB INDIRECT FLD-MCNC: 0.7 MG/DL — SIGNIFICANT CHANGE UP (ref 0.2–1.2)
BILIRUB SERPL-MCNC: 1.1 MG/DL — SIGNIFICANT CHANGE UP (ref 0.2–1.2)
BUN SERPL-MCNC: 26 MG/DL — HIGH (ref 10–20)
BUN SERPL-MCNC: 26 MG/DL — HIGH (ref 10–20)
BUN SERPL-MCNC: 29 MG/DL — HIGH (ref 10–20)
CALCIUM SERPL-MCNC: 8.4 MG/DL — SIGNIFICANT CHANGE UP (ref 8.4–10.4)
CALCIUM SERPL-MCNC: 8.7 MG/DL — SIGNIFICANT CHANGE UP (ref 8.4–10.5)
CALCIUM SERPL-MCNC: 8.7 MG/DL — SIGNIFICANT CHANGE UP (ref 8.4–10.5)
CHLORIDE SERPL-SCNC: 92 MMOL/L — LOW (ref 98–110)
CO2 SERPL-SCNC: 32 MMOL/L — SIGNIFICANT CHANGE UP (ref 17–32)
CO2 SERPL-SCNC: 34 MMOL/L — HIGH (ref 17–32)
CO2 SERPL-SCNC: 34 MMOL/L — HIGH (ref 17–32)
CREAT SERPL-MCNC: 0.5 MG/DL — LOW (ref 0.7–1.5)
EGFR: 93 ML/MIN/1.73M2 — SIGNIFICANT CHANGE UP
EOSINOPHIL # BLD AUTO: 0.04 K/UL — SIGNIFICANT CHANGE UP (ref 0–0.7)
EOSINOPHIL # BLD AUTO: 0.04 K/UL — SIGNIFICANT CHANGE UP (ref 0–0.7)
EOSINOPHIL NFR BLD AUTO: 0.3 % — SIGNIFICANT CHANGE UP (ref 0–8)
EOSINOPHIL NFR BLD AUTO: 0.3 % — SIGNIFICANT CHANGE UP (ref 0–8)
GLUCOSE SERPL-MCNC: 177 MG/DL — HIGH (ref 70–99)
GLUCOSE SERPL-MCNC: 96 MG/DL — SIGNIFICANT CHANGE UP (ref 70–99)
GLUCOSE SERPL-MCNC: 96 MG/DL — SIGNIFICANT CHANGE UP (ref 70–99)
HCT VFR BLD CALC: 33.8 % — LOW (ref 37–47)
HCT VFR BLD CALC: 33.8 % — LOW (ref 37–47)
HGB BLD-MCNC: 10.6 G/DL — LOW (ref 12–16)
HGB BLD-MCNC: 10.6 G/DL — LOW (ref 12–16)
IMM GRANULOCYTES NFR BLD AUTO: 0.9 % — HIGH (ref 0.1–0.3)
IMM GRANULOCYTES NFR BLD AUTO: 0.9 % — HIGH (ref 0.1–0.3)
INR BLD: 3.54 RATIO — HIGH (ref 0.65–1.3)
INR BLD: 3.54 RATIO — HIGH (ref 0.65–1.3)
LYMPHOCYTES # BLD AUTO: 1.98 K/UL — SIGNIFICANT CHANGE UP (ref 1.2–3.4)
LYMPHOCYTES # BLD AUTO: 1.98 K/UL — SIGNIFICANT CHANGE UP (ref 1.2–3.4)
LYMPHOCYTES # BLD AUTO: 14.1 % — LOW (ref 20.5–51.1)
LYMPHOCYTES # BLD AUTO: 14.1 % — LOW (ref 20.5–51.1)
MAGNESIUM SERPL-MCNC: 2 MG/DL — SIGNIFICANT CHANGE UP (ref 1.8–2.4)
MAGNESIUM SERPL-MCNC: 2 MG/DL — SIGNIFICANT CHANGE UP (ref 1.8–2.4)
MCHC RBC-ENTMCNC: 27 PG — SIGNIFICANT CHANGE UP (ref 27–31)
MCHC RBC-ENTMCNC: 27 PG — SIGNIFICANT CHANGE UP (ref 27–31)
MCHC RBC-ENTMCNC: 31.4 G/DL — LOW (ref 32–37)
MCHC RBC-ENTMCNC: 31.4 G/DL — LOW (ref 32–37)
MCV RBC AUTO: 86 FL — SIGNIFICANT CHANGE UP (ref 81–99)
MCV RBC AUTO: 86 FL — SIGNIFICANT CHANGE UP (ref 81–99)
MONOCYTES # BLD AUTO: 2.11 K/UL — HIGH (ref 0.1–0.6)
MONOCYTES # BLD AUTO: 2.11 K/UL — HIGH (ref 0.1–0.6)
MONOCYTES NFR BLD AUTO: 15 % — HIGH (ref 1.7–9.3)
MONOCYTES NFR BLD AUTO: 15 % — HIGH (ref 1.7–9.3)
NEUTROPHILS # BLD AUTO: 9.79 K/UL — HIGH (ref 1.4–6.5)
NEUTROPHILS # BLD AUTO: 9.79 K/UL — HIGH (ref 1.4–6.5)
NEUTROPHILS NFR BLD AUTO: 69.6 % — SIGNIFICANT CHANGE UP (ref 42.2–75.2)
NEUTROPHILS NFR BLD AUTO: 69.6 % — SIGNIFICANT CHANGE UP (ref 42.2–75.2)
NRBC # BLD: 0 /100 WBCS — SIGNIFICANT CHANGE UP (ref 0–0)
NRBC # BLD: 0 /100 WBCS — SIGNIFICANT CHANGE UP (ref 0–0)
PLATELET # BLD AUTO: 376 K/UL — SIGNIFICANT CHANGE UP (ref 130–400)
PLATELET # BLD AUTO: 376 K/UL — SIGNIFICANT CHANGE UP (ref 130–400)
PMV BLD: 10.2 FL — SIGNIFICANT CHANGE UP (ref 7.4–10.4)
PMV BLD: 10.2 FL — SIGNIFICANT CHANGE UP (ref 7.4–10.4)
POTASSIUM SERPL-MCNC: 3.5 MMOL/L — SIGNIFICANT CHANGE UP (ref 3.5–5)
POTASSIUM SERPL-MCNC: 3.6 MMOL/L — SIGNIFICANT CHANGE UP (ref 3.5–5)
POTASSIUM SERPL-MCNC: 3.6 MMOL/L — SIGNIFICANT CHANGE UP (ref 3.5–5)
POTASSIUM SERPL-SCNC: 3.5 MMOL/L — SIGNIFICANT CHANGE UP (ref 3.5–5)
POTASSIUM SERPL-SCNC: 3.6 MMOL/L — SIGNIFICANT CHANGE UP (ref 3.5–5)
POTASSIUM SERPL-SCNC: 3.6 MMOL/L — SIGNIFICANT CHANGE UP (ref 3.5–5)
PROT SERPL-MCNC: 6.1 G/DL — SIGNIFICANT CHANGE UP (ref 6–8)
PROTHROM AB SERPL-ACNC: >40 SEC — HIGH (ref 9.95–12.87)
PROTHROM AB SERPL-ACNC: >40 SEC — HIGH (ref 9.95–12.87)
RBC # BLD: 3.93 M/UL — LOW (ref 4.2–5.4)
RBC # BLD: 3.93 M/UL — LOW (ref 4.2–5.4)
RBC # FLD: 15.7 % — HIGH (ref 11.5–14.5)
RBC # FLD: 15.7 % — HIGH (ref 11.5–14.5)
SODIUM SERPL-SCNC: 135 MMOL/L — SIGNIFICANT CHANGE UP (ref 135–146)
SODIUM SERPL-SCNC: 136 MMOL/L — SIGNIFICANT CHANGE UP (ref 135–146)
SODIUM SERPL-SCNC: 136 MMOL/L — SIGNIFICANT CHANGE UP (ref 135–146)
WBC # BLD: 14.06 K/UL — HIGH (ref 4.8–10.8)
WBC # BLD: 14.06 K/UL — HIGH (ref 4.8–10.8)
WBC # FLD AUTO: 14.06 K/UL — HIGH (ref 4.8–10.8)
WBC # FLD AUTO: 14.06 K/UL — HIGH (ref 4.8–10.8)

## 2024-01-15 PROCEDURE — 99232 SBSQ HOSP IP/OBS MODERATE 35: CPT

## 2024-01-15 RX ORDER — WARFARIN SODIUM 2.5 MG/1
2 TABLET ORAL ONCE
Refills: 0 | Status: DISCONTINUED | OUTPATIENT
Start: 2024-01-15 | End: 2024-01-15

## 2024-01-15 RX ORDER — POTASSIUM CHLORIDE 20 MEQ
40 PACKET (EA) ORAL ONCE
Refills: 0 | Status: COMPLETED | OUTPATIENT
Start: 2024-01-15 | End: 2024-01-15

## 2024-01-15 RX ADMIN — Medication 6 MILLIGRAM(S): at 06:32

## 2024-01-15 RX ADMIN — MEROPENEM 100 MILLIGRAM(S): 1 INJECTION INTRAVENOUS at 21:39

## 2024-01-15 RX ADMIN — MEROPENEM 100 MILLIGRAM(S): 1 INJECTION INTRAVENOUS at 13:16

## 2024-01-15 RX ADMIN — MEROPENEM 100 MILLIGRAM(S): 1 INJECTION INTRAVENOUS at 06:26

## 2024-01-15 RX ADMIN — Medication 5 MILLIGRAM(S): at 21:39

## 2024-01-15 RX ADMIN — Medication 25 MICROGRAM(S): at 06:30

## 2024-01-15 RX ADMIN — MAGNESIUM OXIDE 400 MG ORAL TABLET 400 MILLIGRAM(S): 241.3 TABLET ORAL at 17:55

## 2024-01-15 RX ADMIN — VALSARTAN 160 MILLIGRAM(S): 80 TABLET ORAL at 06:30

## 2024-01-15 RX ADMIN — PANTOPRAZOLE SODIUM 40 MILLIGRAM(S): 20 TABLET, DELAYED RELEASE ORAL at 06:30

## 2024-01-15 RX ADMIN — POLYETHYLENE GLYCOL 3350 17 GRAM(S): 17 POWDER, FOR SOLUTION ORAL at 13:16

## 2024-01-15 RX ADMIN — AMLODIPINE BESYLATE 5 MILLIGRAM(S): 2.5 TABLET ORAL at 06:30

## 2024-01-15 RX ADMIN — MAGNESIUM OXIDE 400 MG ORAL TABLET 400 MILLIGRAM(S): 241.3 TABLET ORAL at 08:27

## 2024-01-15 RX ADMIN — MAGNESIUM OXIDE 400 MG ORAL TABLET 400 MILLIGRAM(S): 241.3 TABLET ORAL at 13:16

## 2024-01-15 RX ADMIN — ATORVASTATIN CALCIUM 80 MILLIGRAM(S): 80 TABLET, FILM COATED ORAL at 21:39

## 2024-01-15 RX ADMIN — Medication 50 MILLIGRAM(S): at 06:29

## 2024-01-15 RX ADMIN — Medication 40 MILLIGRAM(S): at 06:32

## 2024-01-15 RX ADMIN — KETOTIFEN FUMARATE 1 DROP(S): 0.34 SOLUTION OPHTHALMIC at 13:21

## 2024-01-15 RX ADMIN — Medication 40 MILLIEQUIVALENT(S): at 10:11

## 2024-01-15 NOTE — PROGRESS NOTE ADULT - ASSESSMENT
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   Pt was sent to the ED today by PCP as her INR was 8. She was also hypoxic on ambulation to 80s.       Acute HFpEF  Covid-19 infection  S/P recent MV replacement (bioprosthetic) + TV annuloplasty  Complete heart block s/p Micra  H/O recent ESBL Klebsiella bacteremia   Supratherapeutic INR  Transaminitis                  PLAN:     ·	Tele reviewed. No events.   ·	CTA chest noted. No acute PE.   ·	D/C Lasix.  Torsemide 20 mg po daily  ·	Check i's and o's and daily wt  ·	Low salt diet and water restriction to 1.5 L/D  ·	Monitor electrolytes and renal function.   ·	ID eval noted. Recommended IV RDV for 5 days  and dexamethasone 6 mg iv q24h for 10 days. If discharged earlier then d/c steroids. Completed RDV.  ·	H/O ESBL Klebsiella bacteremia. ID recommended Meropenem 1 gm iv q8h for now and on discharge Ertapenem 1 gm iv q24h with  end date 4 weeks from 12/28 and to be f/u by ID at [facility].   ·	INR is 3.54. Will hold Coumadin today and check INR in AM  ·	LFT'S noted. Trending down today. Likely due to RDV. Last dose was yesterday. Follow LFT'S  ·	PT eval noted. Family wants her to go to SNF.     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   Pt was sent to the ED today by PCP as her INR was 8. She was also hypoxic on ambulation to 80s.       Acute HFpEF  Covid-19 infection  S/P recent MV replacement (bioprosthetic) + TV annuloplasty  Complete heart block s/p Micra  H/O recent ESBL Klebsiella bacteremia   Supratherapeutic INR  Transaminitis                  PLAN:     ·	Tele reviewed. No events.   ·	CTA chest noted. No acute PE.   ·	D/C Lasix.  Torsemide 20 mg po daily  ·	Check i's and o's and daily wt  ·	Low salt diet and water restriction to 1.5 L/D  ·	Monitor electrolytes and renal function.   ·	ID eval noted. Recommended IV RDV for 5 days  and dexamethasone 6 mg iv q24h for 10 days. If discharged earlier then d/c steroids. Completed RDV.  ·	H/O ESBL Klebsiella bacteremia. ID recommended Meropenem 1 gm iv q8h for now and on discharge Ertapenem 1 gm iv q24h with  end date 4 weeks from 12/28 and to be f/u by ID at [facility].   ·	INR is 3.54. Will hold Coumadin today and check INR in AM  ·	LFT'S noted. Trending down today. Likely due to RDV. Last dose was yesterday. Follow LFT'S  ·	PT eval noted. Family wants her to go to SNF.     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   Pt was sent to the ED today by PCP as her INR was 8. She was also hypoxic on ambulation to 80s.       Acute HFpEF  Covid-19 infection  S/P recent MV replacement (bioprosthetic) + TV annuloplasty  Complete heart block s/p Micra  H/O recent ESBL Klebsiella bacteremia   Supratherapeutic INR  Transaminitis                  PLAN:     ·	Tele reviewed. No events.   ·	CTA chest noted. No acute PE.   ·	D/C Lasix.  Torsemide 20 mg po daily  ·	Check i's and o's and daily wt  ·	Low salt diet and water restriction to 1.5 L/D  ·	Monitor electrolytes and renal function.   ·	ID eval noted. Recommended IV RDV for 5 days  and dexamethasone 6 mg iv q24h for 10 days. If discharged earlier then d/c steroids. Completed RDV.  ·	H/O ESBL Klebsiella bacteremia. ID recommended Meropenem 1 gm iv q8h for now and on discharge Ertapenem 1 gm iv q24h with  end date 4 weeks from 12/28 and to be f/u by ID at [facility].   ·	INR is 3.54. Will hold Coumadin today and check INR in AM  ·	LFT'S noted. Trending down today. Likely due to RDV. Last dose was yesterday. Follow LFT'S  ·	PT eval noted. Family wants her to go to SNF.     Progress Note Handoff    Pending (specify):  Consults_________, Tests________, Test Results_______, Other__Social services for discharge planning_______  Family discussion: With the daughter about the diagnosis and plan of care  Disposition: Home___/SNF___/Other________/Unknown at this time________    Armando Pinto MD  Spectra: [phone]

## 2024-01-15 NOTE — PROGRESS NOTE ADULT - ASSESSMENT
82 yo F w PMH of AF on warfarin, rheumatic MV disease, severe MR and TR s/p bioprosthetic MV replacement and TV annuloplasty on 12/13/2023 at [facility], c/b CHB s/p Micra opn 12/17, R thoracentesis for pleural effusion on 12/18/2023, , readmission at [facility] for MDR/ESBL Klebsiella bacteremia, positive urine and sputum cultures, s/p repeat R thoracentesis , possible infected pericardial effusion, discharged on 12/28 with PICC line for 4 weeks of IV ertapenem here for eval of SOB and supratherapeutic INR.    medrec based on surescripts, pt does not know meds  pharmacy: [facility]; plz call Rx / daughter to confirm medrec in am    #AHRF 2/2 HFpEF or covid  #HFpEF  #rheumatid heart dz sp recent MV replacement (bioprosthetic) + TV annuloplasty  #CHB sp micra  bnp 7k  CT chest showing bl GGO + small pericardial effusion  seen by cardio  - cont metoprolol 50 qd  01/12  - TTE: EF 55-60%. Entire inferior wall and apical septal segment are abnormal. Mildly increased LV wall thickness. Moderately reduced RV systolic function. Moderately enlarged left atrium.  mild pulmonary hypertension.  01/15  - completed remdesevir course on 01/13  - continue w/ valsartan  - Torsemide 20mg daily  - dexamethasone 6 mg iv q24h for 10 days (end 01/19) per ID    #recent open heart surgery complicated by infected pleural effusion?  # H/x ESBL Klebsiella bacteremia  CT chest : empyema   vs   RML mass    vs   tumor  Bilateral areas of peritoneal nodularity versus complex loculated fluid,   greatest at right anteromedial epicardial region measuring up to 5 x 2   cm.  01/15  - pulm recs apprceciated  - BCX NG@5 days  - ID: start Meropenem 1 gm iv q8h for now and on discharge Ertapenem 1 gm iv q24h with  end date 4 weeks from 12/28 and to be f/u by ID at [facility]  - procal negative    #transaminitis  -LFTs noted, likely due to remdesivir    #supratheupeutic INR  INR 7.79  01/15  - INR 3.54  - continue to hold warfarin per pharmacy    #normocytic anemia  Hb 8.6  - iron studies note  - b12   folate WNL      #HTN  - cont valsartan and amlodipine  - hold home HCTZ in view of hypokalemia    #constipation  - cont miralax and bisacodyl    #copd vs asthma  - cont albuterol prn    pt on tramadol 50 but unsure of frequency    dvt ppx:   GI ppx: protonix  diet: DASH  activity: AAT    Pending: From SNF, DC to STR per family wishes 82 yo F w PMH of AF on warfarin, rheumatic MV disease, 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    medrec based on surescripts, pt does not know meds  pharmacy: [facility]; plz call Rx / daughter to confirm medrec in am    #AHRF 2/2 HFpEF or covid  #HFpEF  #rheumatid heart dz sp recent MV replacement (bioprosthetic) + TV annuloplasty  #CHB sp micra  bnp 7k  CT chest showing bl GGO + small pericardial effusion  seen by cardio  - cont metoprolol 50 qd  01/12  - TTE: EF 55-60%. Entire inferior wall and apical septal segment are abnormal. Mildly increased LV wall thickness. Moderately reduced RV systolic function. Moderately enlarged left atrium.  mild pulmonary hypertension.  01/15  - completed remdesevir course on 01/13  - continue w/ valsartan  - Torsemide 20mg daily  - dexamethasone 6 mg iv q24h for 10 days (end 01/19) per ID    #recent open heart surgery complicated by infected pleural effusion?  # H/x ESBL Klebsiella bacteremia  CT chest : empyema   vs   RML mass    vs   tumor  Bilateral areas of peritoneal nodularity versus complex loculated fluid,   greatest at right anteromedial epicardial region measuring up to 5 x 2   cm.  01/15  - pulm recs apprceciated  - BCX NG@5 days  - ID: start Meropenem 1 gm iv q8h for now and on discharge Ertapenem 1 gm iv q24h with  end date 4 weeks from 12/28 and to be f/u by ID at [facility]  - procal negative    #transaminitis  -LFTs noted, likely due to remdesivir    #supratheupeutic INR  INR 7.79  01/15  - INR 3.54  - continue to hold warfarin per pharmacy    #normocytic anemia  Hb 8.6  - iron studies note  - b12   folate WNL      #HTN  - cont valsartan and amlodipine  - hold home HCTZ in view of hypokalemia    #constipation  - cont miralax and bisacodyl    #copd vs asthma  - cont albuterol prn    pt on tramadol 50 but unsure of frequency    dvt ppx:   GI ppx: protonix  diet: DASH  activity: AAT    Pending: From SNF, DC to STR per family wishes 84 yo F w PMH of AF on warfarin, rheumatic MV disease, 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    medrec based on surescripts, pt does not know meds  pharmacy: [facility]; plz call Rx / daughter to confirm medrec in am    #AHRF 2/2 HFpEF or covid  #HFpEF  #rheumatid heart dz sp recent MV replacement (bioprosthetic) + TV annuloplasty  #CHB sp micra  bnp 7k  CT chest showing bl GGO + small pericardial effusion  seen by cardio  - cont metoprolol 50 qd  01/12  - TTE: EF 55-60%. Entire inferior wall and apical septal segment are abnormal. Mildly increased LV wall thickness. Moderately reduced RV systolic function. Moderately enlarged left atrium.  mild pulmonary hypertension.  01/15  - completed remdesevir course on 01/13  - continue w/ valsartan  - Torsemide 20mg daily  - dexamethasone 6 mg iv q24h for 10 days (end 01/19) per ID    #recent open heart surgery complicated by infected pleural effusion?  # H/x ESBL Klebsiella bacteremia  CT chest : empyema   vs   RML mass    vs   tumor  Bilateral areas of peritoneal nodularity versus complex loculated fluid,   greatest at right anteromedial epicardial region measuring up to 5 x 2   cm.  01/15  - pulm recs apprceciated  - BCX NG@5 days  - ID: start Meropenem 1 gm iv q8h for now and on discharge Ertapenem 1 gm iv q24h with  end date 4 weeks from 12/28 and to be f/u by ID at [facility]  - procal negative    #transaminitis  -LFTs noted, likely due to remdesivir    #supratheupeutic INR  INR 7.79  01/15  - INR 3.54  - continue to hold warfarin per pharmacy    #normocytic anemia  Hb 8.6  - iron studies note  - b12   folate WNL      #HTN  - cont valsartan and amlodipine  - hold home HCTZ in view of hypokalemia    #constipation  - cont miralax and bisacodyl    #copd vs asthma  - cont albuterol prn    pt on tramadol 50 but unsure of frequency    dvt ppx:   GI ppx: protonix  diet: DASH  activity: AAT    Pending: From SNF, DC to STR per family wishes

## 2024-01-15 NOTE — PROGRESS NOTE ADULT - SUBJECTIVE AND OBJECTIVE BOX
SORAYA KING  83y Female    CHIEF COMPLAINT:    Patient is a 83y old  Female who presents with a chief complaint of 83F admit w/ Acute Respiratory Failure with Hypoxia (10 Felipe 2024 20:16)      INTERVAL HPI/OVERNIGHT EVENTS:    Patient seen and examined. Sitting in a chair. No more sob. No fever.     ROS: All other systems are negative.    Vital Signs:    T(F): 97 (01-15-24 @ 14:24), Max: 97.6 (01-14-24 @ 20:45)  HR: 77 (01-15-24 @ 14:24) (60 - 77)  BP: 105/54 (01-15-24 @ 14:24) (105/54 - 181/75)  RR: 18 (01-15-24 @ 14:24) (18 - 18)  SpO2: 98% (01-14-24 @ 20:45) (98% - 98%)  I&O's Summary    14 Jan 2024 07:01  -  15 Felipe 2024 07:00  --------------------------------------------------------  IN: 820 mL / OUT: 2000 mL / NET: -1180 mL    15 Felipe 2024 07:01  -  15 Felipe 2024 14:32  --------------------------------------------------------  IN: 240 mL / OUT: 500 mL / NET: -260 mL      Daily     Daily   CAPILLARY BLOOD GLUCOSE          PHYSICAL EXAM:    GENERAL:  NAD  SKIN: No rashes or lesions  HENT: Atraumatic. Normocephalic. PERRL. Moist membranes.  NECK: Supple, No JVD. No lymphadenopathy.  PULMONARY: CTA B/L. No wheezing. No rales  CVS: Normal S1, S2. Rate and Rhythm are regular. No murmurs.  ABDOMEN/GI: Soft, Nontender, Nondistended; BS present  EXTREMITIES: Peripheral pulses intact. No edema B/L LE.  NEUROLOGIC:  No motor or sensory deficit.  PSYCH: Alert & oriented x 3    Consultant(s) Notes Reviewed:  [x ] YES  [ ] NO  Care Discussed with Consultants/Other Providers [ x] YES  [ ] NO    EKG reviewed  Telemetry reviewed    LABS:                        10.6   14.06 )-----------( 376      ( 15 Felipe 2024 07:17 )             33.8     01-15    136  |  92<L>  |  26<H>  ----------------------------<  96  3.6   |  34<H>  |  0.5<L>    Ca    8.7      15 Felipe 2024 07:17  Mg     2.0     01-15    TPro  6.1  /  Alb  3.4<L>  /  TBili  1.1  /  DBili  0.4<H>  /  AST  71<H>  /  ALT  79<H>  /  AlkPhos  176<H>  01-15    PT/INR - ( 15 Felipe 2024 07:17 )   PT: >40.00 sec;   INR: 3.54 ratio         PTT - ( 15 Felipe 2024 07:17 )  PTT:42.8 sec          RADIOLOGY & ADDITIONAL TESTS:      Imaging or report Personally Reviewed:  [ ] YES  [ ] NO    Medications:  Standing  amLODIPine   Tablet 5 milliGRAM(s) Oral daily  atorvastatin 80 milliGRAM(s) Oral at bedtime  bisacodyl 5 milliGRAM(s) Oral at bedtime  chlorhexidine 2% Cloths 1 Application(s) Topical daily  dexAMETHasone  Injectable 6 milliGRAM(s) IV Push daily  furosemide   Injectable 40 milliGRAM(s) IV Push daily  ketotifen 0.025% Ophthalmic Solution 1 Drop(s) Both EYES daily  levothyroxine 25 MICROGram(s) Oral daily  magnesium oxide 400 milliGRAM(s) Oral three times a day with meals  meropenem  IVPB 1000 milliGRAM(s) IV Intermittent every 8 hours  metoprolol succinate ER 50 milliGRAM(s) Oral daily  pantoprazole    Tablet 40 milliGRAM(s) Oral before breakfast  polyethylene glycol 3350 17 Gram(s) Oral daily  valsartan 160 milliGRAM(s) Oral daily    PRN Meds  albuterol    90 MICROgram(s) HFA Inhaler 2 Puff(s) Inhalation every 6 hours PRN      Case discussed with resident    Care discussed with pt/family           SORAYA KING  83y Female    CHIEF COMPLAINT:    Patient is a 83y old  Female who presents with a chief complaint of 83F admit w/ Acute Respiratory Failure with Hypoxia (10 Felipe 2024 20:16)      INTERVAL HPI/OVERNIGHT EVENTS:    Patient seen and examined. Sitting in a chair. No more sob. No fever.     ROS: All other systems are negative.    Vital Signs:    T(F): 97 (01-15-24 @ 14:24), Max: 97.6 (01-14-24 @ 20:45)  HR: 77 (01-15-24 @ 14:24) (60 - 77)  BP: 105/54 (01-15-24 @ 14:24) (105/54 - 181/75)  RR: 18 (01-15-24 @ 14:24) (18 - 18)  SpO2: 98% (01-14-24 @ 20:45) (98% - 98%)  I&O's Summary    14 Jan 2024 07:01  -  15 Felipe 2024 07:00  --------------------------------------------------------  IN: 820 mL / OUT: 2000 mL / NET: -1180 mL    15 Felipe 2024 07:01  -  15 Felipe 2024 14:32  --------------------------------------------------------  IN: 240 mL / OUT: 500 mL / NET: -260 mL      Daily     Daily   CAPILLARY BLOOD GLUCOSE          PHYSICAL EXAM:    GENERAL:  NAD  SKIN: No rashes or lesions  HENT: Atraumatic. Normocephalic. PERRL. Moist membranes.  NECK: Supple, No JVD. No lymphadenopathy.  PULMONARY: CTA B/L. No wheezing. No rales  CVS: Normal S1, S2. Rate and Rhythm are regular. No murmurs.  ABDOMEN/GI: Soft, Nontender, Nondistended; BS present  EXTREMITIES: Peripheral pulses intact. No edema B/L LE.  NEUROLOGIC:  No motor or sensory deficit.  PSYCH: Alert & oriented x 3    Consultant(s) Notes Reviewed:  [x ] YES  [ ] NO  Care Discussed with Consultants/Other Providers [ x] YES  [ ] NO    EKG reviewed  Telemetry reviewed    LABS:                        10.6   14.06 )-----------( 376      ( 15 Felipe 2024 07:17 )             33.8     01-15    136  |  92<L>  |  26<H>  ----------------------------<  96  3.6   |  34<H>  |  0.5<L>    Ca    8.7      15 Fleipe 2024 07:17  Mg     2.0     01-15    TPro  6.1  /  Alb  3.4<L>  /  TBili  1.1  /  DBili  0.4<H>  /  AST  71<H>  /  ALT  79<H>  /  AlkPhos  176<H>  01-15    PT/INR - ( 15 Felipe 2024 07:17 )   PT: >40.00 sec;   INR: 3.54 ratio         PTT - ( 15 Felipe 2024 07:17 )  PTT:42.8 sec          RADIOLOGY & ADDITIONAL TESTS:      Imaging or report Personally Reviewed:  [ ] YES  [ ] NO    Medications:  Standing  amLODIPine   Tablet 5 milliGRAM(s) Oral daily  atorvastatin 80 milliGRAM(s) Oral at bedtime  bisacodyl 5 milliGRAM(s) Oral at bedtime  chlorhexidine 2% Cloths 1 Application(s) Topical daily  dexAMETHasone  Injectable 6 milliGRAM(s) IV Push daily  furosemide   Injectable 40 milliGRAM(s) IV Push daily  ketotifen 0.025% Ophthalmic Solution 1 Drop(s) Both EYES daily  levothyroxine 25 MICROGram(s) Oral daily  magnesium oxide 400 milliGRAM(s) Oral three times a day with meals  meropenem  IVPB 1000 milliGRAM(s) IV Intermittent every 8 hours  metoprolol succinate ER 50 milliGRAM(s) Oral daily  pantoprazole    Tablet 40 milliGRAM(s) Oral before breakfast  polyethylene glycol 3350 17 Gram(s) Oral daily  valsartan 160 milliGRAM(s) Oral daily    PRN Meds  albuterol    90 MICROgram(s) HFA Inhaler 2 Puff(s) Inhalation every 6 hours PRN      Case discussed with resident    Care discussed with pt/family

## 2024-01-15 NOTE — PROGRESS NOTE ADULT - SUBJECTIVE AND OBJECTIVE BOX
24H events:    Patient is a 83y old Female who presents with a chief complaint of 83F admit w/ Acute Respiratory Failure with Hypoxia (10 Felipe 2024 20:16)    Primary diagnosis of COVID      Today is hospital day 6d. This morning patient was seen and examined at bedside, resting comfortably in bed. Patient has no concerns or complaints at this time. Denies fevers chills, cough on RA. Otherwise, no acute or major events overnight. Hemodynamically stable, tolerating oral diet, voiding appropriately with appropriate bowel movements.     PAST MEDICAL & SURGICAL HISTORY  HTN (hypertension)    Afib    Hypothyroid    FHx: spinal stenosis      SOCIAL HISTORY:  Social History:      ALLERGIES:  No Known Allergies    MEDICATIONS:  STANDING MEDICATIONS  amLODIPine   Tablet 5 milliGRAM(s) Oral daily  atorvastatin 80 milliGRAM(s) Oral at bedtime  bisacodyl 5 milliGRAM(s) Oral at bedtime  chlorhexidine 2% Cloths 1 Application(s) Topical daily  dexAMETHasone  Injectable 6 milliGRAM(s) IV Push daily  furosemide   Injectable 40 milliGRAM(s) IV Push daily  ketotifen 0.025% Ophthalmic Solution 1 Drop(s) Both EYES daily  levothyroxine 25 MICROGram(s) Oral daily  magnesium oxide 400 milliGRAM(s) Oral three times a day with meals  meropenem  IVPB 1000 milliGRAM(s) IV Intermittent every 8 hours  metoprolol succinate ER 50 milliGRAM(s) Oral daily  pantoprazole    Tablet 40 milliGRAM(s) Oral before breakfast  polyethylene glycol 3350 17 Gram(s) Oral daily  valsartan 160 milliGRAM(s) Oral daily    PRN MEDICATIONS  albuterol    90 MICROgram(s) HFA Inhaler 2 Puff(s) Inhalation every 6 hours PRN    VITALS:   T(F): 96.4  HR: 60  BP: 181/75  RR: 18  SpO2: 98%    PHYSICAL EXAM:  GENERAL: NAD, lying in bed comfortably, cooperative  HEAD: NCAT  NECK: Supple     HEART: Systolic murmur, Regular rate and rhythm, normal S1/S2  LUNGS: No acute respiratory distress, clear b/l breath sounds  ABDOMEN:  soft, non-tender, non-distended  EXTREMITIES: no edema  NERVOUS SYSTEM:  A&Ox3, follows commands, answers questions appropriately   SKIN: No rashes or lesions     LABS:                        10.6   14.06 )-----------( 376      ( 15 Felipe 2024 07:17 )             33.8     01-15    136  |  92<L>  |  26<H>  ----------------------------<  96  3.6   |  34<H>  |  0.5<L>    Ca    8.7      15 Felipe 2024 07:17  Mg     2.0     01-15    TPro  6.1  /  Alb  3.4<L>  /  TBili  1.1  /  DBili  0.4<H>  /  AST  71<H>  /  ALT  79<H>  /  AlkPhos  176<H>  01-15    PT/INR - ( 15 Felipe 2024 07:17 )   PT: >40.00 sec;   INR: 3.54 ratio         PTT - ( 15 Felipe 2024 07:17 )  PTT:42.8 sec  Urinalysis Basic - ( 15 Felipe 2024 07:17 )    Color: x / Appearance: x / SG: x / pH: x  Gluc: 96 mg/dL / Ketone: x  / Bili: x / Urobili: x   Blood: x / Protein: x / Nitrite: x   Leuk Esterase: x / RBC: x / WBC x   Sq Epi: x / Non Sq Epi: x / Bacteria: x                RADIOLOGY:    RADIOLOGY

## 2024-01-16 LAB
ALBUMIN SERPL ELPH-MCNC: 3.2 G/DL — LOW (ref 3.5–5.2)
ALBUMIN SERPL ELPH-MCNC: 3.2 G/DL — LOW (ref 3.5–5.2)
ALP SERPL-CCNC: 151 U/L — HIGH (ref 30–115)
ALP SERPL-CCNC: 156 U/L — HIGH (ref 30–115)
ALT FLD-CCNC: 65 U/L — HIGH (ref 0–41)
ALT FLD-CCNC: 65 U/L — HIGH (ref 0–41)
ANION GAP SERPL CALC-SCNC: 9 MMOL/L — SIGNIFICANT CHANGE UP (ref 7–14)
APTT BLD: 39.2 SEC — SIGNIFICANT CHANGE UP (ref 27–39.2)
AST SERPL-CCNC: 55 U/L — HIGH (ref 0–41)
AST SERPL-CCNC: 56 U/L — HIGH (ref 0–41)
BASOPHILS # BLD AUTO: 0.01 K/UL — SIGNIFICANT CHANGE UP (ref 0–0.2)
BASOPHILS NFR BLD AUTO: 0.1 % — SIGNIFICANT CHANGE UP (ref 0–1)
BILIRUB DIRECT SERPL-MCNC: 0.3 MG/DL — SIGNIFICANT CHANGE UP (ref 0–0.3)
BILIRUB INDIRECT FLD-MCNC: 0.7 MG/DL — SIGNIFICANT CHANGE UP (ref 0.2–1.2)
BILIRUB SERPL-MCNC: 1 MG/DL — SIGNIFICANT CHANGE UP (ref 0.2–1.2)
BILIRUB SERPL-MCNC: 1.1 MG/DL — SIGNIFICANT CHANGE UP (ref 0.2–1.2)
BUN SERPL-MCNC: 26 MG/DL — HIGH (ref 10–20)
CALCIUM SERPL-MCNC: 8.7 MG/DL — SIGNIFICANT CHANGE UP (ref 8.4–10.4)
CHLORIDE SERPL-SCNC: 95 MMOL/L — LOW (ref 98–110)
CO2 SERPL-SCNC: 34 MMOL/L — HIGH (ref 17–32)
CREAT SERPL-MCNC: 0.5 MG/DL — LOW (ref 0.7–1.5)
EGFR: 93 ML/MIN/1.73M2 — SIGNIFICANT CHANGE UP
EOSINOPHIL # BLD AUTO: 0.03 K/UL — SIGNIFICANT CHANGE UP (ref 0–0.7)
EOSINOPHIL NFR BLD AUTO: 0.2 % — SIGNIFICANT CHANGE UP (ref 0–8)
GLUCOSE SERPL-MCNC: 89 MG/DL — SIGNIFICANT CHANGE UP (ref 70–99)
HCT VFR BLD CALC: 30.5 % — LOW (ref 37–47)
HGB BLD-MCNC: 9.6 G/DL — LOW (ref 12–16)
IMM GRANULOCYTES NFR BLD AUTO: 0.7 % — HIGH (ref 0.1–0.3)
INR BLD: 2.77 RATIO — HIGH (ref 0.65–1.3)
LYMPHOCYTES # BLD AUTO: 15.6 % — LOW (ref 20.5–51.1)
LYMPHOCYTES # BLD AUTO: 2.23 K/UL — SIGNIFICANT CHANGE UP (ref 1.2–3.4)
MAGNESIUM SERPL-MCNC: 1.8 MG/DL — SIGNIFICANT CHANGE UP (ref 1.8–2.4)
MCHC RBC-ENTMCNC: 26.8 PG — LOW (ref 27–31)
MCHC RBC-ENTMCNC: 31.5 G/DL — LOW (ref 32–37)
MCV RBC AUTO: 85.2 FL — SIGNIFICANT CHANGE UP (ref 81–99)
MONOCYTES # BLD AUTO: 2.13 K/UL — HIGH (ref 0.1–0.6)
MONOCYTES NFR BLD AUTO: 14.9 % — HIGH (ref 1.7–9.3)
NEUTROPHILS # BLD AUTO: 9.8 K/UL — HIGH (ref 1.4–6.5)
NEUTROPHILS NFR BLD AUTO: 68.5 % — SIGNIFICANT CHANGE UP (ref 42.2–75.2)
NRBC # BLD: 0 /100 WBCS — SIGNIFICANT CHANGE UP (ref 0–0)
PLATELET # BLD AUTO: 332 K/UL — SIGNIFICANT CHANGE UP (ref 130–400)
PMV BLD: 10.2 FL — SIGNIFICANT CHANGE UP (ref 7.4–10.4)
POTASSIUM SERPL-MCNC: 4.3 MMOL/L — SIGNIFICANT CHANGE UP (ref 3.5–5)
POTASSIUM SERPL-SCNC: 4.3 MMOL/L — SIGNIFICANT CHANGE UP (ref 3.5–5)
PROT SERPL-MCNC: 5.5 G/DL — LOW (ref 6–8)
PROT SERPL-MCNC: 5.5 G/DL — LOW (ref 6–8)
PROTHROM AB SERPL-ACNC: 31.9 SEC — HIGH (ref 9.95–12.87)
RBC # BLD: 3.58 M/UL — LOW (ref 4.2–5.4)
RBC # FLD: 15.5 % — HIGH (ref 11.5–14.5)
SODIUM SERPL-SCNC: 138 MMOL/L — SIGNIFICANT CHANGE UP (ref 135–146)
WBC # BLD: 14.3 K/UL — HIGH (ref 4.8–10.8)
WBC # FLD AUTO: 14.3 K/UL — HIGH (ref 4.8–10.8)

## 2024-01-16 PROCEDURE — 99232 SBSQ HOSP IP/OBS MODERATE 35: CPT

## 2024-01-16 RX ORDER — WARFARIN SODIUM 2.5 MG/1
2 TABLET ORAL ONCE
Refills: 0 | Status: COMPLETED | OUTPATIENT
Start: 2024-01-16 | End: 2024-01-16

## 2024-01-16 RX ADMIN — Medication 25 MICROGRAM(S): at 06:12

## 2024-01-16 RX ADMIN — Medication 6 MILLIGRAM(S): at 06:10

## 2024-01-16 RX ADMIN — Medication 40 MILLIGRAM(S): at 06:05

## 2024-01-16 RX ADMIN — MEROPENEM 100 MILLIGRAM(S): 1 INJECTION INTRAVENOUS at 13:01

## 2024-01-16 RX ADMIN — ATORVASTATIN CALCIUM 80 MILLIGRAM(S): 80 TABLET, FILM COATED ORAL at 21:21

## 2024-01-16 RX ADMIN — MEROPENEM 100 MILLIGRAM(S): 1 INJECTION INTRAVENOUS at 06:04

## 2024-01-16 RX ADMIN — WARFARIN SODIUM 2 MILLIGRAM(S): 2.5 TABLET ORAL at 21:21

## 2024-01-16 RX ADMIN — PANTOPRAZOLE SODIUM 40 MILLIGRAM(S): 20 TABLET, DELAYED RELEASE ORAL at 06:11

## 2024-01-16 RX ADMIN — MEROPENEM 100 MILLIGRAM(S): 1 INJECTION INTRAVENOUS at 21:21

## 2024-01-16 RX ADMIN — AMLODIPINE BESYLATE 5 MILLIGRAM(S): 2.5 TABLET ORAL at 06:11

## 2024-01-16 RX ADMIN — VALSARTAN 160 MILLIGRAM(S): 80 TABLET ORAL at 06:11

## 2024-01-16 RX ADMIN — Medication 50 MILLIGRAM(S): at 06:11

## 2024-01-16 RX ADMIN — CHLORHEXIDINE GLUCONATE 1 APPLICATION(S): 213 SOLUTION TOPICAL at 12:26

## 2024-01-16 RX ADMIN — KETOTIFEN FUMARATE 1 DROP(S): 0.34 SOLUTION OPHTHALMIC at 12:26

## 2024-01-16 NOTE — PROGRESS NOTE ADULT - ASSESSMENT
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   Pt was sent to the ED today by PCP as her INR was 8. She was also hypoxic on ambulation to 80s.       Acute HFpEF  Covid-19 infection  S/P recent MV replacement (bioprosthetic) + TV annuloplasty  Complete heart block s/p Micra  H/O recent ESBL Klebsiella bacteremia   Supratherapeutic INR  Transaminitis                  PLAN:     ·	Tele reviewed. No events.   ·	CTA chest noted. No acute PE.   ·	Cont Torsemide 20 mg po daily  ·	Check i's and o's and daily wt  ·	Low salt diet and water restriction to 1.5 L/D  ·	Monitor electrolytes and renal function.   ·	ID eval noted. Recommended IV RDV for 5 days  and dexamethasone 6 mg iv q24h for 10 days. If discharged earlier then d/c steroids. Completed RDV.  ·	H/O ESBL Klebsiella bacteremia. ID recommended Meropenem 1 gm iv q8h for now and on discharge Ertapenem 1 gm iv q24h with  end date 4 weeks from 12/28 and to be f/u by ID at [facility].   ·	INR is 2.77 today. Will give Coumadin 2 mg and check INR in AM  ·	LFT'S noted. Trending down today. Likely due to RDV. Last dose was yesterday. Follow LFT'S  ·	PT eval noted. Family wants her to go to SNF.     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   Pt was sent to the ED today by PCP as her INR was 8. She was also hypoxic on ambulation to 80s.       Acute HFpEF  Covid-19 infection  S/P recent MV replacement (bioprosthetic) + TV annuloplasty  Complete heart block s/p Micra  H/O recent ESBL Klebsiella bacteremia   Supratherapeutic INR  Transaminitis                  PLAN:     ·	Tele reviewed. No events.   ·	CTA chest noted. No acute PE.   ·	Cont Torsemide 20 mg po daily  ·	Check i's and o's and daily wt  ·	Low salt diet and water restriction to 1.5 L/D  ·	Monitor electrolytes and renal function.   ·	ID eval noted. Recommended IV RDV for 5 days  and dexamethasone 6 mg iv q24h for 10 days. If discharged earlier then d/c steroids. Completed RDV.  ·	H/O ESBL Klebsiella bacteremia. ID recommended Meropenem 1 gm iv q8h for now and on discharge Ertapenem 1 gm iv q24h with  end date 4 weeks from 12/28 and to be f/u by ID at [facility].   ·	INR is 2.77 today. Will give Coumadin 2 mg and check INR in AM  ·	LFT'S noted. Trending down today. Likely due to RDV. Last dose was yesterday. Follow LFT'S  ·	PT eval noted. Family wants her to go to SNF.     Progress Note Handoff    Pending (specify):  Consults_________, Tests________, Test Results_______, Other__Social services for discharge planning_______  Family discussion: With the daughter about the diagnosis and plan of care  Disposition: Home___/SNF___/Other________/Unknown at this time________    Armando Pinto MD  Spectra: [phone]

## 2024-01-16 NOTE — PROGRESS NOTE ADULT - SUBJECTIVE AND OBJECTIVE BOX
SORAYA KING  83y Female    CHIEF COMPLAINT:    Patient is a 83y old  Female who presents with a chief complaint of 83F admit w/ Acute Respiratory Failure with Hypoxia (10 Felipe 2024 20:16)      INTERVAL HPI/OVERNIGHT EVENTS:    Patient seen and examined. Feels good. No sob. Waiting SNF placement    ROS: All other systems are negative.    Vital Signs:    T(F): 97.4 (24 @ 13:41), Max: 97.7 (24 @ 04:42)  HR: 60 (24 @ 13:41) (60 - 61)  BP: 142/63 (24 @ 13:41) (119/53 - 146/70)  RR: 17 (24 @ 13:41) (17 - 18)  SpO2: 97% (01-15-24 @ 21:00) (97% - 97%)  I&O's Summary    15 Felipe 2024 07:  -  2024 07:00  --------------------------------------------------------  IN: 360 mL / OUT: 1200 mL / NET: -840 mL    2024 07:01  -  2024 14:43  --------------------------------------------------------  IN: 0 mL / OUT: 1001 mL / NET: -1001 mL      Daily     Daily Weight in k (2024 04:42)  CAPILLARY BLOOD GLUCOSE          PHYSICAL EXAM:    GENERAL:  NAD  SKIN: No rashes or lesions  HENT: Atraumatic. Normocephalic. PERRL. Moist membranes.  NECK: Supple, No JVD. No lymphadenopathy.  PULMONARY: CTA B/L. No wheezing. No rales  CVS: Normal S1, S2. Rate and Rhythm are regular. No murmurs.  ABDOMEN/GI: Soft, Nontender, Nondistended; BS present  EXTREMITIES: Peripheral pulses intact. No edema B/L LE.  NEUROLOGIC:  No motor or sensory deficit.  PSYCH: Alert & oriented x 3    Consultant(s) Notes Reviewed:  [x ] YES  [ ] NO  Care Discussed with Consultants/Other Providers [ x] YES  [ ] NO    EKG reviewed  Telemetry reviewed    LABS:                        9.6    14.30 )-----------( 332      ( 2024 06:04 )             30.5     01-16    138  |  95<L>  |  26<H>  ----------------------------<  89  4.3   |  34<H>  |  0.5<L>    Ca    8.7      2024 06:04  Mg     1.8     -16    TPro  5.5<L>  /  Alb  3.2<L>  /  TBili  1.1  /  DBili  0.3  /  AST  55<H>  /  ALT  65<H>  /  AlkPhos  156<H>  -    PT/INR - ( 2024 06:04 )   PT: 31.90 sec;   INR: 2.77 ratio         PTT - ( 2024 06:04 )  PTT:39.2 sec          RADIOLOGY & ADDITIONAL TESTS:      Imaging or report Personally Reviewed:  [ ] YES  [ ] NO    Medications:  Standing  amLODIPine   Tablet 5 milliGRAM(s) Oral daily  atorvastatin 80 milliGRAM(s) Oral at bedtime  bisacodyl 5 milliGRAM(s) Oral at bedtime  chlorhexidine 2% Cloths 1 Application(s) Topical daily  dexAMETHasone  Injectable 6 milliGRAM(s) IV Push daily  furosemide   Injectable 40 milliGRAM(s) IV Push daily  ketotifen 0.025% Ophthalmic Solution 1 Drop(s) Both EYES daily  levothyroxine 25 MICROGram(s) Oral daily  magnesium oxide 400 milliGRAM(s) Oral three times a day with meals  meropenem  IVPB 1000 milliGRAM(s) IV Intermittent every 8 hours  metoprolol succinate ER 50 milliGRAM(s) Oral daily  pantoprazole    Tablet 40 milliGRAM(s) Oral before breakfast  polyethylene glycol 3350 17 Gram(s) Oral daily  valsartan 160 milliGRAM(s) Oral daily  warfarin 2 milliGRAM(s) Oral once    PRN Meds  albuterol    90 MICROgram(s) HFA Inhaler 2 Puff(s) Inhalation every 6 hours PRN      Case discussed with resident    Care discussed with pt/family

## 2024-01-16 NOTE — PHARMACOTHERAPY INTERVENTION NOTE - COMMENTS
83yFemale      Indication:  INR Goal:  Home Dose:  Bridge Therapy:      albuterol    90 MICROgram(s) HFA Inhaler 2 Puff(s) Inhalation every 6 hours PRN  amLODIPine   Tablet 5 milliGRAM(s) Oral daily  atorvastatin 80 milliGRAM(s) Oral at bedtime  bisacodyl 5 milliGRAM(s) Oral at bedtime  chlorhexidine 2% Cloths 1 Application(s) Topical daily  dexAMETHasone  Injectable 6 milliGRAM(s) IV Push daily  furosemide   Injectable 40 milliGRAM(s) IV Push daily  ketotifen 0.025% Ophthalmic Solution 1 Drop(s) Both EYES daily  levothyroxine 25 MICROGram(s) Oral daily  magnesium oxide 400 milliGRAM(s) Oral three times a day with meals  meropenem  IVPB 1000 milliGRAM(s) IV Intermittent every 8 hours  metoprolol succinate ER 50 milliGRAM(s) Oral daily  pantoprazole    Tablet 40 milliGRAM(s) Oral before breakfast  polyethylene glycol 3350 17 Gram(s) Oral daily  valsartan 160 milliGRAM(s) Oral daily  warfarin 2 milliGRAM(s) Oral once        Drug Interactions:       H/H:   PLT:   GFR:    Date---------------INR-----------------Dose    INR: 2.77 ratio (01-16-24 @ 06:04)  INR: 3.54 ratio (01-15-24 @ 07:17)  INR: 5.48 ratio (01-14-24 @ 06:22)  INR: 4.59 ratio (01-13-24 @ 06:01)  INR: 3.23 ratio (01-12-24 @ 05:33)  INR: 2.19 ratio (01-11-24 @ 08:32)  INR: 2.25 ratio (01-10-24 @ 13:29)  INR: 5.02 ratio (01-09-24 @ 17:01)      1. Recommend Warfarin 2mg     PO x 1   2. Obtain INR tomorrow AM

## 2024-01-16 NOTE — PROGRESS NOTE ADULT - SUBJECTIVE AND OBJECTIVE BOX
24H events:    Patient is a 83y old Female who presents with a chief complaint of 83F admit w/ Acute Respiratory Failure with Hypoxia (10 Felipe 2024 20:16)    Primary diagnosis of COVID      Today is hospital day 7d. This morning patient was seen and examined at bedside, resting comfortably in bed. Patient had no complaints or concerns at this time. Otherwise, no acute or major events overnight. Hemodynamically stable, tolerating oral diet, voiding appropriately with appropriate bowel movements.     PAST MEDICAL & SURGICAL HISTORY  HTN (hypertension)    Afib    Hypothyroid    FHx: spinal stenosis      SOCIAL HISTORY:  Social History:      ALLERGIES:  No Known Allergies    MEDICATIONS:  STANDING MEDICATIONS  amLODIPine   Tablet 5 milliGRAM(s) Oral daily  atorvastatin 80 milliGRAM(s) Oral at bedtime  bisacodyl 5 milliGRAM(s) Oral at bedtime  chlorhexidine 2% Cloths 1 Application(s) Topical daily  dexAMETHasone  Injectable 6 milliGRAM(s) IV Push daily  furosemide   Injectable 40 milliGRAM(s) IV Push daily  ketotifen 0.025% Ophthalmic Solution 1 Drop(s) Both EYES daily  levothyroxine 25 MICROGram(s) Oral daily  magnesium oxide 400 milliGRAM(s) Oral three times a day with meals  meropenem  IVPB 1000 milliGRAM(s) IV Intermittent every 8 hours  metoprolol succinate ER 50 milliGRAM(s) Oral daily  pantoprazole    Tablet 40 milliGRAM(s) Oral before breakfast  polyethylene glycol 3350 17 Gram(s) Oral daily  valsartan 160 milliGRAM(s) Oral daily  warfarin 2 milliGRAM(s) Oral once    PRN MEDICATIONS  albuterol    90 MICROgram(s) HFA Inhaler 2 Puff(s) Inhalation every 6 hours PRN    VITALS:   T(F): 97.4  HR: 60  BP: 142/63  RR: 17  SpO2: 97%    PHYSICAL EXAM:  GENERAL: NAD, lying in bed comfortably, cooperative on RA  HEAD: NCAT  NECK: Supple     HEART: Systolic murmur. Regular rate and rhythm, normal S1/S2  LUNGS: No acute respiratory distress, b/l mild ronchi  ABDOMEN:  soft, non-tender, non-distended  EXTREMITIES: b/l 1 + pitting edema.  NERVOUS SYSTEM:  A&Ox3, follows commands, answers questions appropriately   SKIN: No rashes or lesions     LABS:                        9.6    14.30 )-----------( 332      ( 16 Jan 2024 06:04 )             30.5     01-16    138  |  95<L>  |  26<H>  ----------------------------<  89  4.3   |  34<H>  |  0.5<L>    Ca    8.7      16 Jan 2024 06:04  Mg     1.8     01-16    TPro  5.5<L>  /  Alb  3.2<L>  /  TBili  1.1  /  DBili  0.3  /  AST  55<H>  /  ALT  65<H>  /  AlkPhos  156<H>  01-16    PT/INR - ( 16 Jan 2024 06:04 )   PT: 31.90 sec;   INR: 2.77 ratio         PTT - ( 16 Jan 2024 06:04 )  PTT:39.2 sec  Urinalysis Basic - ( 16 Jan 2024 06:04 )    Color: x / Appearance: x / SG: x / pH: x  Gluc: 89 mg/dL / Ketone: x  / Bili: x / Urobili: x   Blood: x / Protein: x / Nitrite: x   Leuk Esterase: x / RBC: x / WBC x   Sq Epi: x / Non Sq Epi: x / Bacteria: x                RADIOLOGY:    RADIOLOGY

## 2024-01-16 NOTE — PROGRESS NOTE ADULT - ASSESSMENT
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    medrec based on surescripts, pt does not know meds  pharmacy: [facility]; plz call Rx / daughter to confirm medrec in am    #AHRF 2/2 HFpEF or covid  #HFpEF  #rheumatid heart dz sp recent MV replacement (bioprosthetic) + TV annuloplasty  #CHB sp micra  bnp 7k  CT chest showing bl GGO + small pericardial effusion  seen by cardio  - cont metoprolol 50 qd  01/12  - TTE: EF 55-60%. Entire inferior wall and apical septal segment are abnormal. Mildly increased LV wall thickness. Moderately reduced RV systolic function. Moderately enlarged left atrium.  mild pulmonary hypertension.  01/15  - completed remdesevir course on 01/13  - continue w/ valsartan  - Torsemide 20mg daily  - dexamethasone 6 mg iv q24h for 10 days (end 01/19) per ID  01/16  - WBC stable at 14 (on dexamethasone 6mg)    #recent open heart surgery complicated by infected pleural effusion?  # H/x ESBL Klebsiella bacteremia  CT chest : empyema   vs   RML mass    vs   tumor  Bilateral areas of peritoneal nodularity versus complex loculated fluid,   greatest at right anteromedial epicardial region measuring up to 5 x 2   cm.  01/15  - pulm recs apprceciated  - BCX NG@5 days  - ID: start Meropenem 1 gm iv q8h for now and on discharge Ertapenem 1 gm iv q24h with  end date 4 weeks from 12/28 and to be f/u by ID at [facility]  - procal negative    #transaminitis  -LFTs noted, likely due to remdesivir    #supratheupeutic INR  INR 7.79  01/15  - INR 3.54  - continue to hold warfarin per pharmacy  01/16  - INR 2.77  - 2mg coumadin    #normocytic anemia  Hb 8.6  - iron studies note  - b12   folate WNL      #HTN  - cont valsartan and amlodipine  - hold home HCTZ in view of hypokalemia    #constipation  - cont miralax and bisacodyl    #copd vs asthma  - cont albuterol prn    pt on tramadol 50 but unsure of frequency    dvt ppx:   GI ppx: protonix  diet: DASH  activity: AAT    Pending: From [facility], [place] to [facility] per family wishes. pending authorization. 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    medrec based on surescripts, pt does not know meds  pharmacy: [facility]; plz call Rx / daughter to confirm medrec in am    #AHRF 2/2 HFpEF or covid  #HFpEF  #rheumatid heart dz sp recent MV replacement (bioprosthetic) + TV annuloplasty  #CHB sp micra  bnp 7k  CT chest showing bl GGO + small pericardial effusion  seen by cardio  - cont metoprolol 50 qd  01/12  - TTE: EF 55-60%. Entire inferior wall and apical septal segment are abnormal. Mildly increased LV wall thickness. Moderately reduced RV systolic function. Moderately enlarged left atrium.  mild pulmonary hypertension.  01/15  - completed remdesevir course on 01/13  - continue w/ valsartan  - Torsemide 20mg daily  - dexamethasone 6 mg iv q24h for 10 days (end 01/19) per ID  01/16  - WBC stable at 14 (on dexamethasone 6mg)    #recent open heart surgery complicated by infected pleural effusion?  # H/x ESBL Klebsiella bacteremia  CT chest : empyema   vs   RML mass    vs   tumor  Bilateral areas of peritoneal nodularity versus complex loculated fluid,   greatest at right anteromedial epicardial region measuring up to 5 x 2   cm.  01/15  - pulm recs apprceciated  - BCX NG@5 days  - ID: start Meropenem 1 gm iv q8h for now and on discharge Ertapenem 1 gm iv q24h with  end date 4 weeks from 12/28 and to be f/u by ID at [facility]  - procal negative    #transaminitis  -LFTs noted, likely due to remdesivir    #supratheupeutic INR  INR 7.79  01/15  - INR 3.54  - continue to hold warfarin per pharmacy  01/16  - INR 2.77  - 2mg coumadin    #normocytic anemia  Hb 8.6  - iron studies note  - b12   folate WNL      #HTN  - cont valsartan and amlodipine  - hold home HCTZ in view of hypokalemia    #constipation  - cont miralax and bisacodyl    #copd vs asthma  - cont albuterol prn    pt on tramadol 50 but unsure of frequency    dvt ppx:   GI ppx: protonix  diet: DASH  activity: AAT    Pending: From [facility], [place] to [facility] per family wishes. pending authorization.

## 2024-01-17 LAB
ALBUMIN SERPL ELPH-MCNC: 3.5 G/DL — SIGNIFICANT CHANGE UP (ref 3.5–5.2)
ALBUMIN SERPL ELPH-MCNC: 3.5 G/DL — SIGNIFICANT CHANGE UP (ref 3.5–5.2)
ALP SERPL-CCNC: 167 U/L — HIGH (ref 30–115)
ALP SERPL-CCNC: 167 U/L — HIGH (ref 30–115)
ALT FLD-CCNC: 69 U/L — HIGH (ref 0–41)
ALT FLD-CCNC: 70 U/L — HIGH (ref 0–41)
ANION GAP SERPL CALC-SCNC: 11 MMOL/L — SIGNIFICANT CHANGE UP (ref 7–14)
APTT BLD: 39.3 SEC — HIGH (ref 27–39.2)
AST SERPL-CCNC: 57 U/L — HIGH (ref 0–41)
AST SERPL-CCNC: 57 U/L — HIGH (ref 0–41)
BASOPHILS # BLD AUTO: 0.01 K/UL — SIGNIFICANT CHANGE UP (ref 0–0.2)
BASOPHILS NFR BLD AUTO: 0.1 % — SIGNIFICANT CHANGE UP (ref 0–1)
BILIRUB DIRECT SERPL-MCNC: 0.4 MG/DL — HIGH (ref 0–0.3)
BILIRUB INDIRECT FLD-MCNC: 0.8 MG/DL — SIGNIFICANT CHANGE UP (ref 0.2–1.2)
BILIRUB SERPL-MCNC: 1.2 MG/DL — SIGNIFICANT CHANGE UP (ref 0.2–1.2)
BILIRUB SERPL-MCNC: 1.2 MG/DL — SIGNIFICANT CHANGE UP (ref 0.2–1.2)
BUN SERPL-MCNC: 27 MG/DL — HIGH (ref 10–20)
CALCIUM SERPL-MCNC: 8.9 MG/DL — SIGNIFICANT CHANGE UP (ref 8.4–10.4)
CHLORIDE SERPL-SCNC: 90 MMOL/L — LOW (ref 98–110)
CO2 SERPL-SCNC: 32 MMOL/L — SIGNIFICANT CHANGE UP (ref 17–32)
CREAT SERPL-MCNC: 0.5 MG/DL — LOW (ref 0.7–1.5)
EGFR: 93 ML/MIN/1.73M2 — SIGNIFICANT CHANGE UP
EOSINOPHIL # BLD AUTO: 0.04 K/UL — SIGNIFICANT CHANGE UP (ref 0–0.7)
EOSINOPHIL NFR BLD AUTO: 0.2 % — SIGNIFICANT CHANGE UP (ref 0–8)
GLUCOSE SERPL-MCNC: 161 MG/DL — HIGH (ref 70–99)
HCT VFR BLD CALC: 32.6 % — LOW (ref 37–47)
HGB BLD-MCNC: 10.5 G/DL — LOW (ref 12–16)
IMM GRANULOCYTES NFR BLD AUTO: 0.9 % — HIGH (ref 0.1–0.3)
INR BLD: 2.12 RATIO — HIGH (ref 0.65–1.3)
LYMPHOCYTES # BLD AUTO: 1 K/UL — LOW (ref 1.2–3.4)
LYMPHOCYTES # BLD AUTO: 5.8 % — LOW (ref 20.5–51.1)
MCHC RBC-ENTMCNC: 27.3 PG — SIGNIFICANT CHANGE UP (ref 27–31)
MCHC RBC-ENTMCNC: 32.2 G/DL — SIGNIFICANT CHANGE UP (ref 32–37)
MCV RBC AUTO: 84.7 FL — SIGNIFICANT CHANGE UP (ref 81–99)
MONOCYTES # BLD AUTO: 1.39 K/UL — HIGH (ref 0.1–0.6)
MONOCYTES NFR BLD AUTO: 8.1 % — SIGNIFICANT CHANGE UP (ref 1.7–9.3)
NEUTROPHILS # BLD AUTO: 14.62 K/UL — HIGH (ref 1.4–6.5)
NEUTROPHILS NFR BLD AUTO: 84.9 % — HIGH (ref 42.2–75.2)
NRBC # BLD: 0 /100 WBCS — SIGNIFICANT CHANGE UP (ref 0–0)
PLATELET # BLD AUTO: 357 K/UL — SIGNIFICANT CHANGE UP (ref 130–400)
PMV BLD: 10 FL — SIGNIFICANT CHANGE UP (ref 7.4–10.4)
POTASSIUM SERPL-MCNC: 2.9 MMOL/L — LOW (ref 3.5–5)
POTASSIUM SERPL-SCNC: 2.9 MMOL/L — LOW (ref 3.5–5)
PROT SERPL-MCNC: 6.1 G/DL — SIGNIFICANT CHANGE UP (ref 6–8)
PROT SERPL-MCNC: 6.1 G/DL — SIGNIFICANT CHANGE UP (ref 6–8)
PROTHROM AB SERPL-ACNC: 24.4 SEC — HIGH (ref 9.95–12.87)
RBC # BLD: 3.85 M/UL — LOW (ref 4.2–5.4)
RBC # FLD: 15.6 % — HIGH (ref 11.5–14.5)
SODIUM SERPL-SCNC: 133 MMOL/L — LOW (ref 135–146)
WBC # BLD: 17.22 K/UL — HIGH (ref 4.8–10.8)
WBC # FLD AUTO: 17.22 K/UL — HIGH (ref 4.8–10.8)

## 2024-01-17 PROCEDURE — 99239 HOSP IP/OBS DSCHRG MGMT >30: CPT

## 2024-01-17 RX ORDER — WARFARIN SODIUM 2.5 MG/1
4 TABLET ORAL ONCE
Refills: 0 | Status: COMPLETED | OUTPATIENT
Start: 2024-01-17 | End: 2024-01-17

## 2024-01-17 RX ORDER — ERTAPENEM SODIUM 1 G/1
1000 INJECTION, POWDER, LYOPHILIZED, FOR SOLUTION INTRAMUSCULAR; INTRAVENOUS EVERY 24 HOURS
Refills: 0 | Status: COMPLETED | OUTPATIENT
Start: 2024-01-17 | End: 2024-01-17

## 2024-01-17 RX ORDER — MAGNESIUM SULFATE 500 MG/ML
2 VIAL (ML) INJECTION ONCE
Refills: 0 | Status: COMPLETED | OUTPATIENT
Start: 2024-01-17 | End: 2024-01-17

## 2024-01-17 RX ORDER — WARFARIN SODIUM 2.5 MG/1
1 TABLET ORAL
Qty: 30 | Refills: 0
Start: 2024-01-17 | End: 2024-02-15

## 2024-01-17 RX ORDER — POTASSIUM CHLORIDE 20 MEQ
20 PACKET (EA) ORAL
Refills: 0 | Status: COMPLETED | OUTPATIENT
Start: 2024-01-17 | End: 2024-01-17

## 2024-01-17 RX ORDER — POTASSIUM CHLORIDE 20 MEQ
20 PACKET (EA) ORAL ONCE
Refills: 0 | Status: COMPLETED | OUTPATIENT
Start: 2024-01-17 | End: 2024-01-17

## 2024-01-17 RX ADMIN — Medication 20 MILLIEQUIVALENT(S): at 23:15

## 2024-01-17 RX ADMIN — MEROPENEM 100 MILLIGRAM(S): 1 INJECTION INTRAVENOUS at 14:18

## 2024-01-17 RX ADMIN — Medication 20 MILLIEQUIVALENT(S): at 21:42

## 2024-01-17 RX ADMIN — ERTAPENEM SODIUM 120 MILLIGRAM(S): 1 INJECTION, POWDER, LYOPHILIZED, FOR SOLUTION INTRAMUSCULAR; INTRAVENOUS at 17:32

## 2024-01-17 RX ADMIN — MAGNESIUM OXIDE 400 MG ORAL TABLET 400 MILLIGRAM(S): 241.3 TABLET ORAL at 10:32

## 2024-01-17 RX ADMIN — MAGNESIUM OXIDE 400 MG ORAL TABLET 400 MILLIGRAM(S): 241.3 TABLET ORAL at 13:02

## 2024-01-17 RX ADMIN — VALSARTAN 160 MILLIGRAM(S): 80 TABLET ORAL at 05:52

## 2024-01-17 RX ADMIN — Medication 50 MILLIGRAM(S): at 05:52

## 2024-01-17 RX ADMIN — Medication 20 MILLIEQUIVALENT(S): at 20:22

## 2024-01-17 RX ADMIN — Medication 40 MILLIGRAM(S): at 05:53

## 2024-01-17 RX ADMIN — Medication 50 MILLIEQUIVALENT(S): at 17:35

## 2024-01-17 RX ADMIN — Medication 6 MILLIGRAM(S): at 05:53

## 2024-01-17 RX ADMIN — ATORVASTATIN CALCIUM 80 MILLIGRAM(S): 80 TABLET, FILM COATED ORAL at 21:42

## 2024-01-17 RX ADMIN — CHLORHEXIDINE GLUCONATE 1 APPLICATION(S): 213 SOLUTION TOPICAL at 13:02

## 2024-01-17 RX ADMIN — WARFARIN SODIUM 4 MILLIGRAM(S): 2.5 TABLET ORAL at 20:22

## 2024-01-17 RX ADMIN — Medication 25 MICROGRAM(S): at 05:52

## 2024-01-17 RX ADMIN — PANTOPRAZOLE SODIUM 40 MILLIGRAM(S): 20 TABLET, DELAYED RELEASE ORAL at 05:52

## 2024-01-17 RX ADMIN — AMLODIPINE BESYLATE 5 MILLIGRAM(S): 2.5 TABLET ORAL at 05:52

## 2024-01-17 RX ADMIN — MEROPENEM 100 MILLIGRAM(S): 1 INJECTION INTRAVENOUS at 06:08

## 2024-01-17 RX ADMIN — MAGNESIUM OXIDE 400 MG ORAL TABLET 400 MILLIGRAM(S): 241.3 TABLET ORAL at 17:34

## 2024-01-17 RX ADMIN — POLYETHYLENE GLYCOL 3350 17 GRAM(S): 17 POWDER, FOR SOLUTION ORAL at 13:02

## 2024-01-17 RX ADMIN — Medication 25 GRAM(S): at 14:18

## 2024-01-17 NOTE — PROGRESS NOTE ADULT - SUBJECTIVE AND OBJECTIVE BOX
SORAYA KING  83y Female    CHIEF COMPLAINT:    Patient is a 83y old  Female who presents with a chief complaint of 83F admit w/ Acute Respiratory Failure with Hypoxia (10 Felipe 2024 20:16)      INTERVAL HPI/OVERNIGHT EVENTS:    Patient seen and examined. Feels good. No sob. Has bed available in the NH    ROS: All other systems are negative.    Vital Signs:    T(F): 97.3 (24 @ 13:09), Max: 97.8 (24 @ 04:45)  HR: 81 (24 @ 13:09) (81 - 87)  BP: 139/65 (24 @ 13:09) (127/60 - 145/69)  RR: 18 (24 @ 13:09) (18 - 18)  SpO2: --  I&O's Summary    2024 07:01  -  2024 07:00  --------------------------------------------------------  IN: 236 mL / OUT: 1501 mL / NET: -1265 mL    2024 07:01  -  2024 16:32  --------------------------------------------------------  IN: 622 mL / OUT: 1100 mL / NET: -478 mL      Daily     Daily Weight in k (2024 04:45)  CAPILLARY BLOOD GLUCOSE          PHYSICAL EXAM:    GENERAL:  NAD  SKIN: No rashes or lesions  HENT: Atraumatic. Normocephalic. PERRL. Moist membranes.  NECK: Supple, No JVD. No lymphadenopathy.  PULMONARY: CTA B/L. No wheezing. No rales  CVS: Normal S1, S2. Rate and Rhythm are regular. No murmurs.  ABDOMEN/GI: Soft, Nontender, Nondistended; BS present  EXTREMITIES: Peripheral pulses intact. No edema B/L LE.  NEUROLOGIC:  No motor or sensory deficit.  PSYCH: Alert & oriented x 3    Consultant(s) Notes Reviewed:  [x ] YES  [ ] NO  Care Discussed with Consultants/Other Providers [ x] YES  [ ] NO    EKG reviewed  Telemetry reviewed    LABS:                        10.5   17.22 )-----------( 357      ( 2024 04:30 )             32.6     01-    133<L>  |  90<L>  |  27<H>  ----------------------------<  161<H>  2.9<L>   |  32  |  0.5<L>    Ca    8.9      2024 04:30  Mg     1.8         TPro  6.1  /  Alb  3.5  /  TBili  1.2  /  DBili  0.4<H>  /  AST  57<H>  /  ALT  70<H>  /  AlkPhos  167<H>      PT/INR - ( 2024 04:30 )   PT: 24.40 sec;   INR: 2.12 ratio         PTT - ( 2024 04:30 )  PTT:39.3 sec          RADIOLOGY & ADDITIONAL TESTS:      Imaging or report Personally Reviewed:  [ ] YES  [ ] NO    Medications:  Standing  amLODIPine   Tablet 5 milliGRAM(s) Oral daily  atorvastatin 80 milliGRAM(s) Oral at bedtime  bisacodyl 5 milliGRAM(s) Oral at bedtime  chlorhexidine 2% Cloths 1 Application(s) Topical daily  dexAMETHasone  Injectable 6 milliGRAM(s) IV Push daily  ertapenem  IVPB 1000 milliGRAM(s) IV Intermittent every 24 hours  furosemide   Injectable 40 milliGRAM(s) IV Push daily  ketotifen 0.025% Ophthalmic Solution 1 Drop(s) Both EYES daily  levothyroxine 25 MICROGram(s) Oral daily  magnesium oxide 400 milliGRAM(s) Oral three times a day with meals  metoprolol succinate ER 50 milliGRAM(s) Oral daily  pantoprazole    Tablet 40 milliGRAM(s) Oral before breakfast  polyethylene glycol 3350 17 Gram(s) Oral daily  valsartan 160 milliGRAM(s) Oral daily  warfarin 4 milliGRAM(s) Oral once    PRN Meds  albuterol    90 MICROgram(s) HFA Inhaler 2 Puff(s) Inhalation every 6 hours PRN      Case discussed with resident    Care discussed with pt/family

## 2024-01-17 NOTE — PROGRESS NOTE ADULT - ASSESSMENT
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   Pt was sent to the ED today by PCP as her INR was 8. She was also hypoxic on ambulation to 80s.       Acute HFpEF  Covid-19 infection  S/P recent MV replacement (bioprosthetic) + TV annuloplasty  Complete heart block s/p Micra  H/O recent ESBL Klebsiella bacteremia   Supratherapeutic INR  Transaminitis                  PLAN:     ·	Labs noted. Replete K and Mg.   ·	CTA chest noted. No acute PE.   ·	Cont Torsemide 20 mg po daily  ·	Check i's and o's and daily wt  ·	Low salt diet and water restriction to 1.5 L/D  ·	Monitor electrolytes and renal function.   ·	Completed 5 day course of RDV as per ID recommendation. D/C Dexamethasone  ·	H/O ESBL Klebsiella bacteremia. ID recommended Meropenem 1 gm iv q8h for now and on discharge Ertapenem 1 gm iv q24h with  end date 4 weeks from 12/28 and to be f/u by ID at [facility].   ·	INR is 2.12 today. Will give Coumadin 4 mg tonight. On d/c Coumadin 3 mg po qhs.   ·	LFT'S noted. Trending down. Likely due to RDV.   ·	PT eval noted. Family wants her to go to SNF.   ·	Can d/c to SNF    * Med rec reviewed. Plan of care d/w the pt and her family on bedside. Time spent 40 minutes.

## 2024-01-18 ENCOUNTER — TRANSCRIPTION ENCOUNTER (OUTPATIENT)
Age: 84
End: 2024-01-18

## 2024-01-18 VITALS
RESPIRATION RATE: 18 BRPM | DIASTOLIC BLOOD PRESSURE: 56 MMHG | SYSTOLIC BLOOD PRESSURE: 135 MMHG | HEART RATE: 102 BPM | TEMPERATURE: 97 F

## 2024-01-18 LAB
ALBUMIN SERPL ELPH-MCNC: 3.7 G/DL — SIGNIFICANT CHANGE UP (ref 3.5–5.2)
ALBUMIN SERPL ELPH-MCNC: 3.7 G/DL — SIGNIFICANT CHANGE UP (ref 3.5–5.2)
ALP SERPL-CCNC: 168 U/L — HIGH (ref 30–115)
ALP SERPL-CCNC: 169 U/L — HIGH (ref 30–115)
ALT FLD-CCNC: 68 U/L — HIGH (ref 0–41)
ALT FLD-CCNC: 69 U/L — HIGH (ref 0–41)
ANION GAP SERPL CALC-SCNC: 8 MMOL/L — SIGNIFICANT CHANGE UP (ref 7–14)
APTT BLD: 35.7 SEC — SIGNIFICANT CHANGE UP (ref 27–39.2)
AST SERPL-CCNC: 56 U/L — HIGH (ref 0–41)
AST SERPL-CCNC: 56 U/L — HIGH (ref 0–41)
BASOPHILS # BLD AUTO: 0.02 K/UL — SIGNIFICANT CHANGE UP (ref 0–0.2)
BASOPHILS NFR BLD AUTO: 0.1 % — SIGNIFICANT CHANGE UP (ref 0–1)
BILIRUB DIRECT SERPL-MCNC: 0.4 MG/DL — HIGH (ref 0–0.3)
BILIRUB INDIRECT FLD-MCNC: 0.9 MG/DL — SIGNIFICANT CHANGE UP (ref 0.2–1.2)
BILIRUB SERPL-MCNC: 1.3 MG/DL — HIGH (ref 0.2–1.2)
BILIRUB SERPL-MCNC: 1.3 MG/DL — HIGH (ref 0.2–1.2)
BUN SERPL-MCNC: 26 MG/DL — HIGH (ref 10–20)
CALCIUM SERPL-MCNC: 9.2 MG/DL — SIGNIFICANT CHANGE UP (ref 8.4–10.4)
CHLORIDE SERPL-SCNC: 93 MMOL/L — LOW (ref 98–110)
CO2 SERPL-SCNC: 36 MMOL/L — HIGH (ref 17–32)
CREAT SERPL-MCNC: 0.5 MG/DL — LOW (ref 0.7–1.5)
EGFR: 93 ML/MIN/1.73M2 — SIGNIFICANT CHANGE UP
EOSINOPHIL # BLD AUTO: 0.17 K/UL — SIGNIFICANT CHANGE UP (ref 0–0.7)
EOSINOPHIL NFR BLD AUTO: 1 % — SIGNIFICANT CHANGE UP (ref 0–8)
GLUCOSE SERPL-MCNC: 96 MG/DL — SIGNIFICANT CHANGE UP (ref 70–99)
HCT VFR BLD CALC: 32 % — LOW (ref 37–47)
HGB BLD-MCNC: 10.1 G/DL — LOW (ref 12–16)
IMM GRANULOCYTES NFR BLD AUTO: 0.8 % — HIGH (ref 0.1–0.3)
INR BLD: 1.94 RATIO — HIGH (ref 0.65–1.3)
LYMPHOCYTES # BLD AUTO: 14.7 % — LOW (ref 20.5–51.1)
LYMPHOCYTES # BLD AUTO: 2.51 K/UL — SIGNIFICANT CHANGE UP (ref 1.2–3.4)
MCHC RBC-ENTMCNC: 27.2 PG — SIGNIFICANT CHANGE UP (ref 27–31)
MCHC RBC-ENTMCNC: 31.6 G/DL — LOW (ref 32–37)
MCV RBC AUTO: 86 FL — SIGNIFICANT CHANGE UP (ref 81–99)
MONOCYTES # BLD AUTO: 2.65 K/UL — HIGH (ref 0.1–0.6)
MONOCYTES NFR BLD AUTO: 15.6 % — HIGH (ref 1.7–9.3)
NEUTROPHILS # BLD AUTO: 11.55 K/UL — HIGH (ref 1.4–6.5)
NEUTROPHILS NFR BLD AUTO: 67.8 % — SIGNIFICANT CHANGE UP (ref 42.2–75.2)
NRBC # BLD: 0 /100 WBCS — SIGNIFICANT CHANGE UP (ref 0–0)
PLATELET # BLD AUTO: 354 K/UL — SIGNIFICANT CHANGE UP (ref 130–400)
PMV BLD: 10 FL — SIGNIFICANT CHANGE UP (ref 7.4–10.4)
POTASSIUM SERPL-MCNC: 4.5 MMOL/L — SIGNIFICANT CHANGE UP (ref 3.5–5)
POTASSIUM SERPL-SCNC: 4.5 MMOL/L — SIGNIFICANT CHANGE UP (ref 3.5–5)
PROT SERPL-MCNC: 6.2 G/DL — SIGNIFICANT CHANGE UP (ref 6–8)
PROT SERPL-MCNC: 6.2 G/DL — SIGNIFICANT CHANGE UP (ref 6–8)
PROTHROM AB SERPL-ACNC: 22.3 SEC — HIGH (ref 9.95–12.87)
RBC # BLD: 3.72 M/UL — LOW (ref 4.2–5.4)
RBC # FLD: 15.7 % — HIGH (ref 11.5–14.5)
SODIUM SERPL-SCNC: 137 MMOL/L — SIGNIFICANT CHANGE UP (ref 135–146)
WBC # BLD: 17.03 K/UL — HIGH (ref 4.8–10.8)
WBC # FLD AUTO: 17.03 K/UL — HIGH (ref 4.8–10.8)

## 2024-01-18 RX ORDER — ERTAPENEM SODIUM 1 G/1
1000 INJECTION, POWDER, LYOPHILIZED, FOR SOLUTION INTRAMUSCULAR; INTRAVENOUS EVERY 24 HOURS
Refills: 0 | Status: DISCONTINUED | OUTPATIENT
Start: 2024-01-18 | End: 2024-01-18

## 2024-01-18 RX ORDER — WARFARIN SODIUM 2.5 MG/1
3 TABLET ORAL ONCE
Refills: 0 | Status: COMPLETED | OUTPATIENT
Start: 2024-01-18 | End: 2024-01-18

## 2024-01-18 RX ADMIN — MAGNESIUM OXIDE 400 MG ORAL TABLET 400 MILLIGRAM(S): 241.3 TABLET ORAL at 13:28

## 2024-01-18 RX ADMIN — Medication 25 MICROGRAM(S): at 05:22

## 2024-01-18 RX ADMIN — PANTOPRAZOLE SODIUM 40 MILLIGRAM(S): 20 TABLET, DELAYED RELEASE ORAL at 05:22

## 2024-01-18 RX ADMIN — POLYETHYLENE GLYCOL 3350 17 GRAM(S): 17 POWDER, FOR SOLUTION ORAL at 13:27

## 2024-01-18 RX ADMIN — MAGNESIUM OXIDE 400 MG ORAL TABLET 400 MILLIGRAM(S): 241.3 TABLET ORAL at 10:49

## 2024-01-18 RX ADMIN — Medication 40 MILLIGRAM(S): at 05:22

## 2024-01-18 RX ADMIN — AMLODIPINE BESYLATE 5 MILLIGRAM(S): 2.5 TABLET ORAL at 05:22

## 2024-01-18 RX ADMIN — Medication 50 MILLIGRAM(S): at 05:22

## 2024-01-18 RX ADMIN — WARFARIN SODIUM 3 MILLIGRAM(S): 2.5 TABLET ORAL at 13:26

## 2024-01-18 RX ADMIN — VALSARTAN 160 MILLIGRAM(S): 80 TABLET ORAL at 05:22

## 2024-01-18 RX ADMIN — CHLORHEXIDINE GLUCONATE 1 APPLICATION(S): 213 SOLUTION TOPICAL at 13:24

## 2024-01-18 RX ADMIN — ERTAPENEM SODIUM 120 MILLIGRAM(S): 1 INJECTION, POWDER, LYOPHILIZED, FOR SOLUTION INTRAMUSCULAR; INTRAVENOUS at 13:24

## 2024-01-18 NOTE — PROGRESS NOTE ADULT - PROVIDER SPECIALTY LIST ADULT
Internal Medicine
Internal Medicine
Hospitalist
Internal Medicine
Hospitalist
Internal Medicine

## 2024-01-18 NOTE — DISCHARGE NOTE NURSING/CASE MANAGEMENT/SOCIAL WORK - NSDCPEFALRISK_GEN_ALL_CORE
For information on Fall & Injury Prevention, visit: [email or web address] OR  [email or web address] OR  https://www.cdc.gov/steadi/patient.html

## 2024-01-18 NOTE — PROGRESS NOTE ADULT - SUBJECTIVE AND OBJECTIVE BOX
24H events:    Patient is a 83y old Female who presents with a chief complaint of 83F admit w/ Acute Respiratory Failure with Hypoxia (10 Felipe 2024 20:16)    Primary diagnosis of COVID      Today is hospital day 9d. This morning patient was seen and examined at bedside, resting comfortably in bed.  Otherwise, no acute or major events overnight. Hemodynamically stable, tolerating oral diet, voiding appropriately with appropriate bowel movements.     PAST MEDICAL & SURGICAL HISTORY  HTN (hypertension)    Afib    Hypothyroid    FHx: spinal stenosis      SOCIAL HISTORY:  Social History:      ALLERGIES:  No Known Allergies    MEDICATIONS:  STANDING MEDICATIONS  amLODIPine   Tablet 5 milliGRAM(s) Oral daily  atorvastatin 80 milliGRAM(s) Oral at bedtime  bisacodyl 5 milliGRAM(s) Oral at bedtime  chlorhexidine 2% Cloths 1 Application(s) Topical daily  furosemide   Injectable 40 milliGRAM(s) IV Push daily  ketotifen 0.025% Ophthalmic Solution 1 Drop(s) Both EYES daily  levothyroxine 25 MICROGram(s) Oral daily  magnesium oxide 400 milliGRAM(s) Oral three times a day with meals  metoprolol succinate ER 50 milliGRAM(s) Oral daily  pantoprazole    Tablet 40 milliGRAM(s) Oral before breakfast  polyethylene glycol 3350 17 Gram(s) Oral daily  valsartan 160 milliGRAM(s) Oral daily    PRN MEDICATIONS  albuterol    90 MICROgram(s) HFA Inhaler 2 Puff(s) Inhalation every 6 hours PRN    VITALS:   T(F): 97.7  HR: 107  BP: 162/74  RR: 18  SpO2: --    PHYSICAL EXAM:  GENERAL: NAD, lying in bed comfortably, cooperative,   HEAD: NCAT  NECK: Supple  LUNGS: No acute respiratory distress,   ABDOMEN: non-distended,   NERVOUS SYSTEM: answers questions appropriately     LABS:                        10.1   17.03 )-----------( 354      ( 18 Jan 2024 06:14 )             32.0     01-18    137  |  93<L>  |  26<H>  ----------------------------<  96  4.5   |  36<H>  |  0.5<L>    Ca    9.2      18 Jan 2024 06:14    TPro  6.2  /  Alb  3.7  /  TBili  1.3<H>  /  DBili  0.4<H>  /  AST  56<H>  /  ALT  69<H>  /  AlkPhos  168<H>  01-18    PT/INR - ( 18 Jan 2024 06:14 )   PT: 22.30 sec;   INR: 1.94 ratio         PTT - ( 18 Jan 2024 06:14 )  PTT:35.7 sec  Urinalysis Basic - ( 18 Jan 2024 06:14 )    Color: x / Appearance: x / SG: x / pH: x  Gluc: 96 mg/dL / Ketone: x  / Bili: x / Urobili: x   Blood: x / Protein: x / Nitrite: x   Leuk Esterase: x / RBC: x / WBC x   Sq Epi: x / Non Sq Epi: x / Bacteria: x                RADIOLOGY:    RADIOLOGY

## 2024-01-18 NOTE — PROGRESS NOTE ADULT - ASSESSMENT
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    medrec based on surescripts, pt does not know meds  pharmacy: [facility]; plz call Rx / daughter to confirm medrec in am    #AHRF 2/2 HFpEF or covid  #HFpEF  #rheumatid heart dz sp recent MV replacement (bioprosthetic) + TV annuloplasty  #CHB sp micra  bnp 7k  CT chest showing bl GGO + small pericardial effusion  seen by cardio  - cont metoprolol 50 qd  01/12  - TTE: EF 55-60%. Entire inferior wall and apical septal segment are abnormal. Mildly increased LV wall thickness. Moderately reduced RV systolic function. Moderately enlarged left atrium.  mild pulmonary hypertension.  01/15  - completed remdesevir course on 01/13  - continue w/ valsartan  - Torsemide 20mg daily  - dexamethasone 6 mg iv q24h for 10 days (end 01/19) per ID  01/16  - WBC stable at 14 (on dexamethasone 6mg)  01/17  - DC dexamethasone    #recent open heart surgery complicated by infected pleural effusion?  # H/x ESBL Klebsiella bacteremia  CT chest : empyema   vs   RML mass    vs   tumor  Bilateral areas of peritoneal nodularity versus complex loculated fluid,   greatest at right anteromedial epicardial region measuring up to 5 x 2   cm.  01/15  - pulm recs apprceciated  - BCX NG@5 days  - ID: start Meropenem 1 gm iv q8h for now and on discharge Ertapenem 1 gm iv q24h with  end date 4 weeks from 12/28 and to be [facility]  - procal negative    #transaminitis  -LFTs noted, likely due to remdesivir    #supratheupeutic INR  INR 7.79  01/15  - INR 3.54  - continue to hold warfarin per pharmacy  01/16  - INR 2.77  - 2mg coumadin  01/17  - INR 2.12  - 4mg coumadin  01/18  - INR 1.94    #normocytic anemia  Hb 8.6  - iron studies note  - b12   folate WNL      #HTN  - cont valsartan and amlodipine  - hold home HCTZ in view of hypokalemia    #constipation  - cont miralax and bisacodyl    #copd vs asthma  - cont albuterol prn    pt on tramadol 50 but unsure of frequency    dvt ppx:   GI ppx: protonix  diet: DASH  activity: AAT    Pending: From [facility], [place] to [facility] per family wishes. pending authorization.

## 2024-01-18 NOTE — DISCHARGE NOTE NURSING/CASE MANAGEMENT/SOCIAL WORK - PATIENT PORTAL LINK FT
You can access the FollowMyHealth Patient Portal offered by [facility] by registering at the following website: [email or web address]. By joining [facility]’s FollowMyHealth portal, you will also be able to view your health information using other applications (apps) compatible with our system.

## 2024-01-19 ENCOUNTER — NON-APPOINTMENT (OUTPATIENT)
Age: 84
End: 2024-01-19

## 2024-02-07 ENCOUNTER — INPATIENT (INPATIENT)
Facility: HOSPITAL | Age: 84
LOS: 6 days | Discharge: HOME | DRG: 871 | End: 2024-02-14
Attending: INTERNAL MEDICINE | Admitting: HOSPITALIST
Payer: MEDICARE

## 2024-02-07 VITALS
RESPIRATION RATE: 20 BRPM | HEIGHT: 60 IN | HEART RATE: 75 BPM | DIASTOLIC BLOOD PRESSURE: 67 MMHG | OXYGEN SATURATION: 95 % | TEMPERATURE: 97 F | SYSTOLIC BLOOD PRESSURE: 153 MMHG

## 2024-02-07 DIAGNOSIS — R09.89 OTHER SPECIFIED SYMPTOMS AND SIGNS INVOLVING THE CIRCULATORY AND RESPIRATORY SYSTEMS: ICD-10-CM

## 2024-02-07 DIAGNOSIS — Z82.69 FAMILY HISTORY OF OTHER DISEASES OF THE MUSCULOSKELETAL SYSTEM AND CONNECTIVE TISSUE: Chronic | ICD-10-CM

## 2024-02-07 DIAGNOSIS — J18.9 PNEUMONIA, UNSPECIFIED ORGANISM: ICD-10-CM

## 2024-02-07 LAB
ALBUMIN SERPL ELPH-MCNC: 3.4 G/DL — LOW (ref 3.5–5.2)
ALP SERPL-CCNC: 152 U/L — HIGH (ref 30–115)
ALT FLD-CCNC: 23 U/L — SIGNIFICANT CHANGE UP (ref 0–41)
ANION GAP SERPL CALC-SCNC: 11 MMOL/L — SIGNIFICANT CHANGE UP (ref 7–14)
ANION GAP SERPL CALC-SCNC: 14 MMOL/L — SIGNIFICANT CHANGE UP (ref 7–14)
APPEARANCE UR: CLEAR — SIGNIFICANT CHANGE UP
AST SERPL-CCNC: 34 U/L — SIGNIFICANT CHANGE UP (ref 0–41)
BASOPHILS # BLD AUTO: 0 K/UL — SIGNIFICANT CHANGE UP (ref 0–0.2)
BASOPHILS NFR BLD AUTO: 0 % — SIGNIFICANT CHANGE UP (ref 0–1)
BILIRUB SERPL-MCNC: 1.1 MG/DL — SIGNIFICANT CHANGE UP (ref 0.2–1.2)
BILIRUB UR-MCNC: NEGATIVE — SIGNIFICANT CHANGE UP
BUN SERPL-MCNC: 12 MG/DL — SIGNIFICANT CHANGE UP (ref 10–20)
BUN SERPL-MCNC: 14 MG/DL — SIGNIFICANT CHANGE UP (ref 10–20)
CALCIUM SERPL-MCNC: 7.5 MG/DL — LOW (ref 8.4–10.5)
CALCIUM SERPL-MCNC: 8.6 MG/DL — SIGNIFICANT CHANGE UP (ref 8.4–10.4)
CHLORIDE SERPL-SCNC: 94 MMOL/L — LOW (ref 98–110)
CHLORIDE SERPL-SCNC: 96 MMOL/L — LOW (ref 98–110)
CO2 SERPL-SCNC: 23 MMOL/L — SIGNIFICANT CHANGE UP (ref 17–32)
CO2 SERPL-SCNC: 24 MMOL/L — SIGNIFICANT CHANGE UP (ref 17–32)
COLOR SPEC: YELLOW — SIGNIFICANT CHANGE UP
CREAT SERPL-MCNC: 0.6 MG/DL — LOW (ref 0.7–1.5)
CREAT SERPL-MCNC: 0.7 MG/DL — SIGNIFICANT CHANGE UP (ref 0.7–1.5)
DIFF PNL FLD: NEGATIVE — SIGNIFICANT CHANGE UP
EGFR: 86 ML/MIN/1.73M2 — SIGNIFICANT CHANGE UP
EGFR: 89 ML/MIN/1.73M2 — SIGNIFICANT CHANGE UP
EOSINOPHIL # BLD AUTO: 0 K/UL — SIGNIFICANT CHANGE UP (ref 0–0.7)
EOSINOPHIL NFR BLD AUTO: 0 % — SIGNIFICANT CHANGE UP (ref 0–8)
FLUAV AG NPH QL: SIGNIFICANT CHANGE UP
FLUBV AG NPH QL: SIGNIFICANT CHANGE UP
GAS PNL BLDV: SIGNIFICANT CHANGE UP
GLUCOSE SERPL-MCNC: 119 MG/DL — HIGH (ref 70–99)
GLUCOSE SERPL-MCNC: 123 MG/DL — HIGH (ref 70–99)
GLUCOSE UR QL: NEGATIVE MG/DL — SIGNIFICANT CHANGE UP
HCT VFR BLD CALC: 29 % — LOW (ref 37–47)
HGB BLD-MCNC: 9.1 G/DL — LOW (ref 12–16)
INR BLD: 2.9 RATIO — HIGH (ref 0.65–1.3)
KETONES UR-MCNC: NEGATIVE MG/DL — SIGNIFICANT CHANGE UP
LEUKOCYTE ESTERASE UR-ACNC: ABNORMAL
LYMPHOCYTES # BLD AUTO: 0.17 K/UL — LOW (ref 1.2–3.4)
LYMPHOCYTES # BLD AUTO: 0.9 % — LOW (ref 20.5–51.1)
MCHC RBC-ENTMCNC: 26.5 PG — LOW (ref 27–31)
MCHC RBC-ENTMCNC: 31.4 G/DL — LOW (ref 32–37)
MCV RBC AUTO: 84.3 FL — SIGNIFICANT CHANGE UP (ref 81–99)
MONOCYTES # BLD AUTO: 1.82 K/UL — HIGH (ref 0.1–0.6)
MONOCYTES NFR BLD AUTO: 9.6 % — HIGH (ref 1.7–9.3)
NEUTROPHILS # BLD AUTO: 16.6 K/UL — HIGH (ref 1.4–6.5)
NEUTROPHILS NFR BLD AUTO: 86.9 % — HIGH (ref 42.2–75.2)
NITRITE UR-MCNC: NEGATIVE — SIGNIFICANT CHANGE UP
NT-PROBNP SERPL-SCNC: 6236 PG/ML — HIGH (ref 0–300)
PH UR: 6 — SIGNIFICANT CHANGE UP (ref 5–8)
PLATELET # BLD AUTO: 343 K/UL — SIGNIFICANT CHANGE UP (ref 130–400)
PMV BLD: 10.1 FL — SIGNIFICANT CHANGE UP (ref 7.4–10.4)
POTASSIUM SERPL-MCNC: 3.3 MMOL/L — LOW (ref 3.5–5)
POTASSIUM SERPL-MCNC: 3.9 MMOL/L — SIGNIFICANT CHANGE UP (ref 3.5–5)
POTASSIUM SERPL-SCNC: 3.3 MMOL/L — LOW (ref 3.5–5)
POTASSIUM SERPL-SCNC: 3.9 MMOL/L — SIGNIFICANT CHANGE UP (ref 3.5–5)
PROT SERPL-MCNC: 7 G/DL — SIGNIFICANT CHANGE UP (ref 6–8)
PROT UR-MCNC: NEGATIVE MG/DL — SIGNIFICANT CHANGE UP
PROTHROM AB SERPL-ACNC: 33.4 SEC — HIGH (ref 9.95–12.87)
RBC # BLD: 3.44 M/UL — LOW (ref 4.2–5.4)
RBC # FLD: 18.6 % — HIGH (ref 11.5–14.5)
RSV RNA NPH QL NAA+NON-PROBE: DETECTED
SARS-COV-2 RNA SPEC QL NAA+PROBE: SIGNIFICANT CHANGE UP
SODIUM SERPL-SCNC: 130 MMOL/L — LOW (ref 135–146)
SODIUM SERPL-SCNC: 132 MMOL/L — LOW (ref 135–146)
SP GR SPEC: 1.01 — SIGNIFICANT CHANGE UP (ref 1–1.03)
TROPONIN T, HIGH SENSITIVITY RESULT: 34 NG/L — HIGH (ref 6–13)
UROBILINOGEN FLD QL: 0.2 MG/DL — SIGNIFICANT CHANGE UP (ref 0.2–1)
WBC # BLD: 18.91 K/UL — HIGH (ref 4.8–10.8)
WBC # FLD AUTO: 18.91 K/UL — HIGH (ref 4.8–10.8)

## 2024-02-07 PROCEDURE — 87641 MR-STAPH DNA AMP PROBE: CPT

## 2024-02-07 PROCEDURE — 71045 X-RAY EXAM CHEST 1 VIEW: CPT | Mod: 26

## 2024-02-07 PROCEDURE — 83540 ASSAY OF IRON: CPT

## 2024-02-07 PROCEDURE — 85730 THROMBOPLASTIN TIME PARTIAL: CPT

## 2024-02-07 PROCEDURE — 72170 X-RAY EXAM OF PELVIS: CPT | Mod: 26

## 2024-02-07 PROCEDURE — 99285 EMERGENCY DEPT VISIT HI MDM: CPT

## 2024-02-07 PROCEDURE — 71260 CT THORAX DX C+: CPT | Mod: 26,MA

## 2024-02-07 PROCEDURE — 36415 COLL VENOUS BLD VENIPUNCTURE: CPT

## 2024-02-07 PROCEDURE — 84145 PROCALCITONIN (PCT): CPT

## 2024-02-07 PROCEDURE — 72125 CT NECK SPINE W/O DYE: CPT | Mod: 26,MA

## 2024-02-07 PROCEDURE — 80053 COMPREHEN METABOLIC PANEL: CPT

## 2024-02-07 PROCEDURE — 93005 ELECTROCARDIOGRAM TRACING: CPT

## 2024-02-07 PROCEDURE — 87449 NOS EACH ORGANISM AG IA: CPT

## 2024-02-07 PROCEDURE — 82728 ASSAY OF FERRITIN: CPT

## 2024-02-07 PROCEDURE — 93970 EXTREMITY STUDY: CPT | Mod: 26

## 2024-02-07 PROCEDURE — 83735 ASSAY OF MAGNESIUM: CPT

## 2024-02-07 PROCEDURE — 87640 STAPH A DNA AMP PROBE: CPT

## 2024-02-07 PROCEDURE — 85610 PROTHROMBIN TIME: CPT

## 2024-02-07 PROCEDURE — 74177 CT ABD & PELVIS W/CONTRAST: CPT | Mod: 26,MA

## 2024-02-07 PROCEDURE — 71045 X-RAY EXAM CHEST 1 VIEW: CPT

## 2024-02-07 PROCEDURE — 84484 ASSAY OF TROPONIN QUANT: CPT

## 2024-02-07 PROCEDURE — 99223 1ST HOSP IP/OBS HIGH 75: CPT

## 2024-02-07 PROCEDURE — 97161 PT EVAL LOW COMPLEX 20 MIN: CPT | Mod: GP

## 2024-02-07 PROCEDURE — 80061 LIPID PANEL: CPT

## 2024-02-07 PROCEDURE — 94640 AIRWAY INHALATION TREATMENT: CPT

## 2024-02-07 PROCEDURE — 92610 EVALUATE SWALLOWING FUNCTION: CPT | Mod: GN

## 2024-02-07 PROCEDURE — 93970 EXTREMITY STUDY: CPT

## 2024-02-07 PROCEDURE — 83550 IRON BINDING TEST: CPT

## 2024-02-07 PROCEDURE — 80048 BASIC METABOLIC PNL TOTAL CA: CPT

## 2024-02-07 PROCEDURE — 83880 ASSAY OF NATRIURETIC PEPTIDE: CPT

## 2024-02-07 PROCEDURE — 87899 AGENT NOS ASSAY W/OPTIC: CPT

## 2024-02-07 PROCEDURE — 70450 CT HEAD/BRAIN W/O DYE: CPT | Mod: 26,MA

## 2024-02-07 PROCEDURE — 85025 COMPLETE CBC W/AUTO DIFF WBC: CPT

## 2024-02-07 RX ORDER — ROSUVASTATIN CALCIUM 5 MG/1
1 TABLET ORAL
Qty: 0 | Refills: 0 | DISCHARGE

## 2024-02-07 RX ORDER — ALENDRONATE SODIUM 70 MG/1
1 TABLET ORAL
Refills: 0 | DISCHARGE

## 2024-02-07 RX ORDER — ACETAMINOPHEN 500 MG
650 TABLET ORAL ONCE
Refills: 0 | Status: COMPLETED | OUTPATIENT
Start: 2024-02-07 | End: 2024-02-07

## 2024-02-07 RX ORDER — IPRATROPIUM/ALBUTEROL SULFATE 18-103MCG
3 AEROSOL WITH ADAPTER (GRAM) INHALATION
Refills: 0 | DISCHARGE

## 2024-02-07 RX ORDER — ATORVASTATIN CALCIUM 80 MG/1
40 TABLET, FILM COATED ORAL AT BEDTIME
Refills: 0 | Status: DISCONTINUED | OUTPATIENT
Start: 2024-02-07 | End: 2024-02-14

## 2024-02-07 RX ORDER — CEFTRIAXONE 500 MG/1
1000 INJECTION, POWDER, FOR SOLUTION INTRAMUSCULAR; INTRAVENOUS ONCE
Refills: 0 | Status: COMPLETED | OUTPATIENT
Start: 2024-02-07 | End: 2024-02-07

## 2024-02-07 RX ORDER — FUROSEMIDE 40 MG
1 TABLET ORAL
Refills: 0 | DISCHARGE

## 2024-02-07 RX ORDER — SPIRONOLACTONE 25 MG/1
1 TABLET, FILM COATED ORAL
Refills: 0 | DISCHARGE

## 2024-02-07 RX ORDER — POTASSIUM CHLORIDE 20 MEQ
20 PACKET (EA) ORAL
Refills: 0 | Status: COMPLETED | OUTPATIENT
Start: 2024-02-07 | End: 2024-02-07

## 2024-02-07 RX ORDER — POLYETHYLENE GLYCOL 3350 17 G/17G
17 POWDER, FOR SOLUTION ORAL
Refills: 0 | DISCHARGE

## 2024-02-07 RX ORDER — METOPROLOL TARTRATE 50 MG
50 TABLET ORAL DAILY
Refills: 0 | Status: DISCONTINUED | OUTPATIENT
Start: 2024-02-07 | End: 2024-02-14

## 2024-02-07 RX ORDER — LEVOTHYROXINE SODIUM 125 MCG
25 TABLET ORAL DAILY
Refills: 0 | Status: DISCONTINUED | OUTPATIENT
Start: 2024-02-07 | End: 2024-02-14

## 2024-02-07 RX ORDER — FUROSEMIDE 40 MG
40 TABLET ORAL DAILY
Refills: 0 | Status: DISCONTINUED | OUTPATIENT
Start: 2024-02-07 | End: 2024-02-14

## 2024-02-07 RX ORDER — METOPROLOL TARTRATE 50 MG
1 TABLET ORAL
Refills: 0 | DISCHARGE

## 2024-02-07 RX ORDER — WARFARIN SODIUM 2.5 MG/1
1 TABLET ORAL
Refills: 0 | DISCHARGE

## 2024-02-07 RX ORDER — LANOLIN ALCOHOL/MO/W.PET/CERES
3 CREAM (GRAM) TOPICAL AT BEDTIME
Refills: 0 | Status: DISCONTINUED | OUTPATIENT
Start: 2024-02-07 | End: 2024-02-14

## 2024-02-07 RX ORDER — ACETAMINOPHEN 500 MG
650 TABLET ORAL EVERY 6 HOURS
Refills: 0 | Status: DISCONTINUED | OUTPATIENT
Start: 2024-02-07 | End: 2024-02-14

## 2024-02-07 RX ORDER — IPRATROPIUM/ALBUTEROL SULFATE 18-103MCG
3 AEROSOL WITH ADAPTER (GRAM) INHALATION EVERY 6 HOURS
Refills: 0 | Status: DISCONTINUED | OUTPATIENT
Start: 2024-02-07 | End: 2024-02-10

## 2024-02-07 RX ORDER — OLOPATADINE HYDROCHLORIDE 1 MG/ML
1 SOLUTION/ DROPS OPHTHALMIC
Refills: 0 | DISCHARGE

## 2024-02-07 RX ORDER — AZITHROMYCIN 500 MG/1
500 TABLET, FILM COATED ORAL ONCE
Refills: 0 | Status: COMPLETED | OUTPATIENT
Start: 2024-02-07 | End: 2024-02-07

## 2024-02-07 RX ORDER — POLYETHYLENE GLYCOL 3350 17 G/17G
17 POWDER, FOR SOLUTION ORAL DAILY
Refills: 0 | Status: DISCONTINUED | OUTPATIENT
Start: 2024-02-07 | End: 2024-02-14

## 2024-02-07 RX ORDER — KETOTIFEN FUMARATE 0.34 MG/ML
1 SOLUTION OPHTHALMIC DAILY
Refills: 0 | Status: DISCONTINUED | OUTPATIENT
Start: 2024-02-07 | End: 2024-02-14

## 2024-02-07 RX ORDER — SODIUM CHLORIDE 9 MG/ML
800 INJECTION INTRAMUSCULAR; INTRAVENOUS; SUBCUTANEOUS ONCE
Refills: 0 | Status: COMPLETED | OUTPATIENT
Start: 2024-02-07 | End: 2024-02-07

## 2024-02-07 RX ORDER — LEVOTHYROXINE SODIUM 125 MCG
1 TABLET ORAL
Refills: 0 | DISCHARGE

## 2024-02-07 RX ORDER — CEFEPIME 1 G/1
2000 INJECTION, POWDER, FOR SOLUTION INTRAMUSCULAR; INTRAVENOUS EVERY 8 HOURS
Refills: 0 | Status: DISCONTINUED | OUTPATIENT
Start: 2024-02-08 | End: 2024-02-08

## 2024-02-07 RX ORDER — MAGNESIUM SULFATE 500 MG/ML
2 VIAL (ML) INJECTION ONCE
Refills: 0 | Status: DISCONTINUED | OUTPATIENT
Start: 2024-02-07 | End: 2024-02-07

## 2024-02-07 RX ORDER — ERTAPENEM SODIUM 1 G/1
1 INJECTION, POWDER, LYOPHILIZED, FOR SOLUTION INTRAMUSCULAR; INTRAVENOUS
Refills: 0 | DISCHARGE
End: 2024-01-10

## 2024-02-07 RX ORDER — CEFEPIME 1 G/1
INJECTION, POWDER, FOR SOLUTION INTRAMUSCULAR; INTRAVENOUS
Refills: 0 | Status: DISCONTINUED | OUTPATIENT
Start: 2024-02-07 | End: 2024-02-07

## 2024-02-07 RX ORDER — CEFEPIME 1 G/1
1000 INJECTION, POWDER, FOR SOLUTION INTRAMUSCULAR; INTRAVENOUS ONCE
Refills: 0 | Status: COMPLETED | OUTPATIENT
Start: 2024-02-07 | End: 2024-02-07

## 2024-02-07 RX ORDER — SPIRONOLACTONE 25 MG/1
25 TABLET, FILM COATED ORAL DAILY
Refills: 0 | Status: DISCONTINUED | OUTPATIENT
Start: 2024-02-07 | End: 2024-02-14

## 2024-02-07 RX ORDER — POTASSIUM CHLORIDE 20 MEQ
40 PACKET (EA) ORAL ONCE
Refills: 0 | Status: COMPLETED | OUTPATIENT
Start: 2024-02-07 | End: 2024-02-07

## 2024-02-07 RX ORDER — SODIUM CHLORIDE 9 MG/ML
1000 INJECTION INTRAMUSCULAR; INTRAVENOUS; SUBCUTANEOUS ONCE
Refills: 0 | Status: COMPLETED | OUTPATIENT
Start: 2024-02-07 | End: 2024-02-07

## 2024-02-07 RX ORDER — AMLODIPINE BESYLATE 2.5 MG/1
1 TABLET ORAL
Refills: 0 | DISCHARGE

## 2024-02-07 RX ORDER — AMLODIPINE BESYLATE 2.5 MG/1
5 TABLET ORAL DAILY
Refills: 0 | Status: DISCONTINUED | OUTPATIENT
Start: 2024-02-07 | End: 2024-02-14

## 2024-02-07 RX ORDER — ALBUTEROL 90 UG/1
2 AEROSOL, METERED ORAL
Refills: 0 | DISCHARGE

## 2024-02-07 RX ORDER — VANCOMYCIN HCL 1 G
1000 VIAL (EA) INTRAVENOUS ONCE
Refills: 0 | Status: COMPLETED | OUTPATIENT
Start: 2024-02-07 | End: 2024-02-07

## 2024-02-07 RX ADMIN — SODIUM CHLORIDE 800 MILLILITER(S): 9 INJECTION INTRAMUSCULAR; INTRAVENOUS; SUBCUTANEOUS at 16:22

## 2024-02-07 RX ADMIN — Medication 40 MILLIEQUIVALENT(S): at 15:56

## 2024-02-07 RX ADMIN — Medication 50 MILLIEQUIVALENT(S): at 22:39

## 2024-02-07 RX ADMIN — AZITHROMYCIN 255 MILLIGRAM(S): 500 TABLET, FILM COATED ORAL at 15:34

## 2024-02-07 RX ADMIN — Medication 40 MILLIGRAM(S): at 20:32

## 2024-02-07 RX ADMIN — Medication 650 MILLIGRAM(S): at 13:57

## 2024-02-07 RX ADMIN — Medication 250 MILLIGRAM(S): at 22:39

## 2024-02-07 RX ADMIN — SODIUM CHLORIDE 1000 MILLILITER(S): 9 INJECTION INTRAMUSCULAR; INTRAVENOUS; SUBCUTANEOUS at 13:57

## 2024-02-07 RX ADMIN — CEFTRIAXONE 100 MILLIGRAM(S): 500 INJECTION, POWDER, FOR SOLUTION INTRAMUSCULAR; INTRAVENOUS at 15:34

## 2024-02-07 RX ADMIN — Medication 50 MILLIEQUIVALENT(S): at 18:29

## 2024-02-07 RX ADMIN — CEFEPIME 100 MILLIGRAM(S): 1 INJECTION, POWDER, FOR SOLUTION INTRAMUSCULAR; INTRAVENOUS at 20:27

## 2024-02-07 RX ADMIN — Medication 50 MILLIEQUIVALENT(S): at 15:56

## 2024-02-07 NOTE — CONSULT NOTE ADULT - SUBJECTIVE AND OBJECTIVE BOX
TRAUMA ACTIVATION LEVEL:  ALERT  ACTIVATED BY:  ED**  INTUBATED:NO**      MECHANISM OF INJURY:   [] Blunt     [] MVC	  [x] Fall	  [] Pedestrian Struck	  [] Motorcycle     [] Assault     [] Bicycle collision    [] Sports injury    [] Penetrating    [] Gun Shot Wound      [] Stab Wound    GCS: 15 	E: 4	V: 5	M: 6    HPI:    83y Female  w/ PMHx of HTN, iron deficiency anemia, afib, COPD, HF and s/p mitral & tricuspid valve replacement on Dec 2023 on coumadin seen as Trauma Alert s/p fall +HT, +LOC, +AC (coumadin) without any external signs of trauma. Patient reports that she fell from the bed at 2AM, afterwards she was feeling ok but then was noted be shaking. Vital Sings done in the nursing home were significant for O2 of 80% at RA. Patient was brought in for further evaluation.  Trauma assessment in ED: ABCs intact , GCS 15 , AAOx3,  MORA.       Fatigue: How much time during the previous 4 weeks did you feel tired?   All or most of the time [   ] Yes (1pt)    [x  ] No  (0pts)  Resistance: Do you have any difficulty walking up 10 steps alone without resting and without aids? [   ] Yes (1pt)    [ x ] No  (0pts)  Ambulation: Do you have any difficulty walking several hundred yards alone without aids? [x   ] Yes (1pt)    [  ] No  (0pts)  Illness: how many illnesses do you have out of list of 11 total? [   ] 5 or more (1pt) [ x ] < 5 (0pts)  Loss of weight: Have you had weight loss of 5% or more? [   ] Yes (1pt)    [ x ] No  (0pts)    Total Score: 1    Score 1-2: Consult medical comangement  Score 3-4: Consult Geriatric service   Score 5: Consult Palliative service [phone]    Laxmi Youngblood G, Ad NELSON, Lisandro PETIT, Gifty KILLIAN. Frality: toward a clinical definition. J AM Med Dir Assoc. 2008; 9 (2): 71-72  Kira JE, Leighton TK, Surjit DK. A simple frailty questionnaire (FRAIL) predicts outcomes in middle aged  Americans. J Nutr Health Aging. 2012; 16 (7): 601-608    PAST MEDICAL & SURGICAL HISTORY:  HTN (hypertension)      Afib      Hypothyroid      FHx: spinal stenosis          Allergies    No Known Allergies    Intolerances        Home Medications:  Albuterol (Eqv-ProAir HFA) 90 mcg/inh inhalation aerosol: 2 puff(s) inhaled 2 times a day as needed for  bronchospasm (09 Jan 2024 06:17)  alendronate 35 mg oral tablet: 1 tab(s) orally once a week (09 Jan 2024 06:17)  amLODIPine 5 mg oral tablet: 1 tab(s) orally once a day (09 Jan 2024 06:17)  bisacodyl 5 mg oral tablet: 1 tab(s) orally once a day (09 Jan 2024 06:20)  ertapenem 1 g injection: 1 gram(s) intravenously once a day (09 Jan 2024 06:17)  Lasix 40 mg oral tablet: 1 tab(s) orally once a day (09 Jan 2024 06:17)  Metoprolol Succinate ER 50 mg oral tablet, extended release: 1 tab(s) orally once a day (09 Jan 2024 06:17)  MiraLax oral powder for reconstitution: 17 gram(s) orally once a day (09 Jan 2024 06:17)  olopatadine 0.1% ophthalmic solution: 1 drop(s) in each affected eye once a day (09 Jan 2024 06:17)  rosuvastatin 20 mg oral tablet: 1 tab(s) orally once a day (at bedtime) (22 Jun 2020 09:12)  Synthroid 25 mcg (0.025 mg) oral tablet: 1 tab(s) orally once a day (09 Jan 2024 06:17)  valsartan-hydrochlorothiazide 160 mg-25 mg oral tablet: 1 tab(s) orally once a day (09 Jan 2024 06:17)      ROS: 10-system review is otherwise negative except HPI above.      Primary Survey:    A - airway intact  B - bilateral breath sounds and good chest rise  C - palpable pulses in all extremities  D - GCS 15 on arrival, MORA  Exposure obtained    Vital Signs Last 24 Hrs  T(C): 39.1 (07 Feb 2024 13:32), Max: 39.1 (07 Feb 2024 13:32)  T(F): 102.4 (07 Feb 2024 13:32), Max: 102.4 (07 Feb 2024 13:32)  HR: 67 (07 Feb 2024 13:32) (67 - 75)  BP: 141/63 (07 Feb 2024 13:32) (136/65 - 154/67)  BP(mean): --  RR: 16 (07 Feb 2024 13:32) (16 - 20)  SpO2: 95% (07 Feb 2024 13:32) (90% - 95%)    Parameters below as of 07 Feb 2024 13:32  Patient On (Oxygen Delivery Method): nasal cannula  O2 Flow (L/min): 3      Secondary Survey:   General: NAD  HEENT: Normocephalic, atraumatic, EOMI, PEERLA. no scalp lacerations   Neck: Soft, midline trachea. no c-spine tenderness  Chest: No chest wall tenderness, no subcutaneous emphysema   Cardiac: S1, S2, RRR  Respiratory: Bilateral breath sounds, clear and equal bilaterally  Abdomen: Soft, non-distended, non-tender, no rebound, no guarding.  Groin: Normal appearing, pelvis stable   Ext:  Moving b/l upper and lower extremities. Palpable Radial b/l UE, b/l DP palpable in LE.   Back: No T/L/S spine tenderness, No palpable runoff/stepoff/deformity          ACCESS / DEVICES:  [ x] Peripheral IV  [ ] Central Venous Line	[ ] R	[ ] L	[ ] IJ	[ ] Fem	[ ] SC	Placed:   [ ] Arterial Line		[ ] R	[ ] L	[ ] Fem	[ ] Rad	[ ] Ax	Placed:   [ ] PICC:					[ ] Mediport  [ ] Urinary Catheter,  Date Placed:   [ ] Chest tube: [ ] Right, [ ] Left  [ ] ERICK/Avery Drains    Labs:  CAPILLARY BLOOD GLUCOSE      POCT Blood Glucose.: 141 mg/dL (07 Feb 2024 13:31)                  LFTs:         Coags:                        RADIOLOGY & ADDITIONAL STUDIES:  ---------------------------------------------------------------------------------------   TRAUMA ACTIVATION LEVEL:  ALERT  ACTIVATED BY:  ED**  INTUBATED:NO**      MECHANISM OF INJURY:   [] Blunt     [] MVC	  [x] Fall	  [] Pedestrian Struck	  [] Motorcycle     [] Assault     [] Bicycle collision    [] Sports injury    [] Penetrating    [] Gun Shot Wound      [] Stab Wound    GCS: 15 	E: 4	V: 5	M: 6    HPI:    83y Female  w/ PMHx of HTN, iron deficiency anemia, afib, COPD, HF and s/p mitral & tricuspid valve replacement on Dec 2023 on coumadin seen as Trauma Alert s/p fall +HT, +LOC, +AC (coumadin) without any external signs of trauma. Patient reports that she fell from the bed at 2AM, afterwards she was feeling ok but then was noted be shaking. Vital Sings done in the nursing home were significant for O2 of 80% at RA. Patient was brought in for further evaluation.  Trauma assessment in ED: ABCs intact , GCS 15 , AAOx3,  MORA.       Fatigue: How much time during the previous 4 weeks did you feel tired?   All or most of the time [   ] Yes (1pt)    [x  ] No  (0pts)  Resistance: Do you have any difficulty walking up 10 steps alone without resting and without aids? [   ] Yes (1pt)    [ x ] No  (0pts)  Ambulation: Do you have any difficulty walking several hundred yards alone without aids? [x   ] Yes (1pt)    [  ] No  (0pts)  Illness: how many illnesses do you have out of list of 11 total? [   ] 5 or more (1pt) [ x ] < 5 (0pts)  Loss of weight: Have you had weight loss of 5% or more? [   ] Yes (1pt)    [ x ] No  (0pts)    Total Score: 1    Score 1-2: Consult medical comangement  Score 3-4: Consult Geriatric service   Score 5: Consult Palliative service [phone]    Laxmi Youngblood G, Ad NELSON, Lisandro PETIT, Gifty KILLIAN. Frality: toward a clinical definition. J AM Med Dir Assoc. 2008; 9 (2): 71-72  Kira JE, Leighton TK, Surjit DK. A simple frailty questionnaire (FRAIL) predicts outcomes in middle aged  Americans. J Nutr Health Aging. 2012; 16 (7): 601-608    PAST MEDICAL & SURGICAL HISTORY:  HTN (hypertension)      Afib      Hypothyroid      FHx: spinal stenosis          Allergies    No Known Allergies    Intolerances        Home Medications:  Albuterol (Eqv-ProAir HFA) 90 mcg/inh inhalation aerosol: 2 puff(s) inhaled 2 times a day as needed for  bronchospasm (2024 06:17)  alendronate 35 mg oral tablet: 1 tab(s) orally once a week (2024 06:17)  amLODIPine 5 mg oral tablet: 1 tab(s) orally once a day (2024 06:17)  bisacodyl 5 mg oral tablet: 1 tab(s) orally once a day (2024 06:20)  ertapenem 1 g injection: 1 gram(s) intravenously once a day (2024 06:17)  Lasix 40 mg oral tablet: 1 tab(s) orally once a day (2024 06:17)  Metoprolol Succinate ER 50 mg oral tablet, extended release: 1 tab(s) orally once a day (2024 06:17)  MiraLax oral powder for reconstitution: 17 gram(s) orally once a day (2024 06:17)  olopatadine 0.1% ophthalmic solution: 1 drop(s) in each affected eye once a day (2024 06:17)  rosuvastatin 20 mg oral tablet: 1 tab(s) orally once a day (at bedtime) (2020 09:12)  Synthroid 25 mcg (0.025 mg) oral tablet: 1 tab(s) orally once a day (2024 06:17)  valsartan-hydrochlorothiazide 160 mg-25 mg oral tablet: 1 tab(s) orally once a day (2024 06:17)      ROS: 10-system review is otherwise negative except HPI above.      Primary Survey:    A - airway intact  B - bilateral breath sounds and good chest rise  C - palpable pulses in all extremities  D - GCS 15 on arrival, MORA  Exposure obtained    Vital Signs Last 24 Hrs  T(C): 39.1 (2024 13:32), Max: 39.1 (2024 13:32)  T(F): 102.4 (2024 13:32), Max: 102.4 (2024 13:32)  HR: 67 (2024 13:32) (67 - 75)  BP: 141/63 (2024 13:32) (136/65 - 154/67)  BP(mean): --  RR: 16 (2024 13:32) (16 - 20)  SpO2: 95% (2024 13:32) (90% - 95%)    Parameters below as of 2024 13:32  Patient On (Oxygen Delivery Method): nasal cannula  O2 Flow (L/min): 3      Secondary Survey:   General: NAD  HEENT: Normocephalic, atraumatic, EOMI, PEERLA. no scalp lacerations   Neck: Soft, midline trachea. no c-spine tenderness  Chest: No chest wall tenderness, no subcutaneous emphysema   Cardiac: S1, S2, RRR  Respiratory: Bilateral breath sounds, clear and equal bilaterally  Abdomen: Soft, non-distended, non-tender, no rebound, no guarding.  Groin: Normal appearing, pelvis stable   Ext:  Moving b/l upper and lower extremities. Palpable Radial b/l UE, b/l DP palpable in LE.   Back: No T/L/S spine tenderness, No palpable runoff/stepoff/deformity          ACCESS / DEVICES:  [ x] Peripheral IV  [ ] Central Venous Line	[ ] R	[ ] L	[ ] IJ	[ ] Fem	[ ] SC	Placed:   [ ] Arterial Line		[ ] R	[ ] L	[ ] Fem	[ ] Rad	[ ] Ax	Placed:   [ ] PICC:					[ ] Mediport  [ ] Urinary Catheter,  Date Placed:   [ ] Chest tube: [ ] Right, [ ] Left  [ ] ERICK/Avery Drains    Labs:  LABS  CBC ( @ 13:56)                              9.1<L>                         18.91<H>  )----------------(  343        86.9<H>% Neutrophils, 0.9<L>% Lymphocytes, ANC: 16.60<H>                              29.0<L>    BMP ( @ 13:56)             132<L>  |  94<L>   |  14    		Ca++ --      Ca 8.6                ---------------------------------( 123<H>		Mg --                 3.3<L>  |  24      |  0.7   			Ph --        LFTs ( @ 13:56)      TPro 7.0 / Alb 3.4<L> / TBili 1.1 / DBili -- / AST 34 / ALT 23 / AlkPhos 152<H>          VBG ( @ 15:23)     7.41 / 38<L> / 35 / 24 / -0.4 / 53.4<L>%     Lactate: 2.0    --------------------------------------------------------------------------------------------    MICROBIOLOGY  Urinalysis ( @ 14:03):     Color: Yellow / Appearance: Clear / S.007 / pH: 6.0 / Gluc: Negative / Ketones: Negative / Bili: Negative / Urobili: 0.2 / Protein :Negative / Nitrites: Negative / Leuk.Est: Trace<!> / RBC: 0 / WBC: 3 / Sq Epi:  / Non Sq Epi: 0 / Bacteria Negative         --------------------------------------------------------------------------------------------    I&O's Summary                    RADIOLOGY & ADDITIONAL STUDIES:  ---------------------------------------------------------------------------------------  < from: CT Head No Cont (24 @ 14:48) >    IMPRESSION:    1.  No evidence of acute intracranial pathology.    2.  Mild/moderate chronic microvascular changes.    --- End of Report ---        < end of copied text >  < from: CT Cervical Spine No Cont (24 @ 14:55) >  IMPRESSION:    1.  The study is significantly limited due to patient motion. The study   may be repeated if clinically warranted.    2.  No obvious acute fractures or subluxation.    3.  Severe degenerative changes.    --- End of Report ---    < end of copied text >  < from: CT Chest w/ IV Cont (24 @ 14:58) >    IMPRESSION:    Areas of consolidation within the right middle lobe and lower lobes.   Small right pleural effusion with adjacent compressive atelectasis. Areas   of nodularity within the right lung not well-seen on prior study may be   inflammatory in nature. Follow-up CAT scan of thorax in 3-6 months time.    Other chronic changes as described.    --- End of Report ---    < end of copied text >   TRAUMA ACTIVATION LEVEL:  ALERT  ACTIVATED BY:  ED**  INTUBATED:NO**      MECHANISM OF INJURY:   [] Blunt     [] MVC	  [x] Fall	  [] Pedestrian Struck	  [] Motorcycle     [] Assault     [] Bicycle collision    [] Sports injury    [] Penetrating    [] Gun Shot Wound      [] Stab Wound    GCS: 15 	E: 4	V: 5	M: 6    HPI:    83y Female  w/ PMHx of HTN, iron deficiency anemia, afib, COPD, HF and s/p mitral & tricuspid valve replacement on Dec 2023 on coumadin seen as Trauma Alert s/p fall +HT, +LOC, +AC (coumadin) without any external signs of trauma. Patient reports that she fell from the bed at 2AM, afterwards she was feeling ok but then was noted be shaking. Vital Sings done in the nursing home were significant for O2 of 80% at RA. Patient was brought in for further evaluation.  Trauma assessment in ED: ABCs intact , GCS 15 , AAOx3,  MORA.       Fatigue: How much time during the previous 4 weeks did you feel tired?   All or most of the time [   ] Yes (1pt)    [x  ] No  (0pts)  Resistance: Do you have any difficulty walking up 10 steps alone without resting and without aids? [   ] Yes (1pt)    [ x ] No  (0pts)  Ambulation: Do you have any difficulty walking several hundred yards alone without aids? [x   ] Yes (1pt)    [  ] No  (0pts)  Illness: how many illnesses do you have out of list of 11 total? [   ] 5 or more (1pt) [ x ] < 5 (0pts)  Loss of weight: Have you had weight loss of 5% or more? [   ] Yes (1pt)    [ x ] No  (0pts)    Total Score: 1    Score 1-2: Consult medical comangement  Score 3-4: Consult Geriatric service   Score 5: Consult Palliative service [phone]    Laxmi Youngblood G, Ad NELSON, Lisandro PETIT, Gifty KILLIAN. Frality: toward a clinical definition. J AM Med Dir Assoc. 2008; 9 (2): 71-72  Kira JE, Leighton TK, Surjit DK. A simple frailty questionnaire (FRAIL) predicts outcomes in middle aged  Americans. J Nutr Health Aging. 2012; 16 (7): 601-608    PAST MEDICAL & SURGICAL HISTORY:  HTN (hypertension)      Afib      Hypothyroid      FHx: spinal stenosis          Allergies    No Known Allergies    Intolerances        Home Medications:  Albuterol (Eqv-ProAir HFA) 90 mcg/inh inhalation aerosol: 2 puff(s) inhaled 2 times a day as needed for  bronchospasm (2024 06:17)  alendronate 35 mg oral tablet: 1 tab(s) orally once a week (2024 06:17)  amLODIPine 5 mg oral tablet: 1 tab(s) orally once a day (2024 06:17)  bisacodyl 5 mg oral tablet: 1 tab(s) orally once a day (2024 06:20)  ertapenem 1 g injection: 1 gram(s) intravenously once a day (2024 06:17)  Lasix 40 mg oral tablet: 1 tab(s) orally once a day (2024 06:17)  Metoprolol Succinate ER 50 mg oral tablet, extended release: 1 tab(s) orally once a day (2024 06:17)  MiraLax oral powder for reconstitution: 17 gram(s) orally once a day (2024 06:17)  olopatadine 0.1% ophthalmic solution: 1 drop(s) in each affected eye once a day (2024 06:17)  rosuvastatin 20 mg oral tablet: 1 tab(s) orally once a day (at bedtime) (2020 09:12)  Synthroid 25 mcg (0.025 mg) oral tablet: 1 tab(s) orally once a day (2024 06:17)  valsartan-hydrochlorothiazide 160 mg-25 mg oral tablet: 1 tab(s) orally once a day (2024 06:17)      ROS: 10-system review is otherwise negative except HPI above.      Primary Survey:    A - airway intact  B - bilateral breath sounds and good chest rise  C - palpable pulses in all extremities  D - GCS 15 on arrival, MORA  Exposure obtained    Vital Signs Last 24 Hrs  T(C): 39.1 (2024 13:32), Max: 39.1 (2024 13:32)  T(F): 102.4 (2024 13:32), Max: 102.4 (2024 13:32)  HR: 67 (2024 13:32) (67 - 75)  BP: 141/63 (2024 13:32) (136/65 - 154/67)  BP(mean): --  RR: 16 (2024 13:32) (16 - 20)  SpO2: 95% (2024 13:32) (90% - 95%)    Parameters below as of 2024 13:32  Patient On (Oxygen Delivery Method): nasal cannula  O2 Flow (L/min): 3      Secondary Survey:   General: NAD  HEENT: Normocephalic, atraumatic, EOMI, PEERLA. no scalp lacerations   Neck: Soft, midline trachea. no c-spine tenderness  Chest: No chest wall tenderness, no subcutaneous emphysema   Cardiac: S1, S2, RRR  Respiratory: Bilateral breath sounds, clear and equal bilaterally  Abdomen: Soft, non-distended, non-tender, no rebound, no guarding.  Groin: Normal appearing, pelvis stable   Ext:  Moving b/l upper and lower extremities. Palpable Radial b/l UE, b/l DP palpable in LE. bilateral ankle edema  Back: No T/L/S spine tenderness, No palpable runoff/stepoff/deformity          ACCESS / DEVICES:  [ x] Peripheral IV  [ ] Central Venous Line	[ ] R	[ ] L	[ ] IJ	[ ] Fem	[ ] SC	Placed:   [ ] Arterial Line		[ ] R	[ ] L	[ ] Fem	[ ] Rad	[ ] Ax	Placed:   [ ] PICC:					[ ] Mediport  [ ] Urinary Catheter,  Date Placed:   [ ] Chest tube: [ ] Right, [ ] Left  [ ] ERICK/Avery Drains    Labs:  LABS  CBC ( @ 13:56)                              9.1<L>                         18.91<H>  )----------------(  343        86.9<H>% Neutrophils, 0.9<L>% Lymphocytes, ANC: 16.60<H>                              29.0<L>    BMP ( @ 13:56)             132<L>  |  94<L>   |  14    		Ca++ --      Ca 8.6                ---------------------------------( 123<H>		Mg --                 3.3<L>  |  24      |  0.7   			Ph --        LFTs ( @ 13:56)      TPro 7.0 / Alb 3.4<L> / TBili 1.1 / DBili -- / AST 34 / ALT 23 / AlkPhos 152<H>          VBG ( @ 15:23)     7.41 / 38<L> / 35 / 24 / -0.4 / 53.4<L>%     Lactate: 2.0    --------------------------------------------------------------------------------------------    MICROBIOLOGY  Urinalysis ( @ 14:03):     Color: Yellow / Appearance: Clear / S.007 / pH: 6.0 / Gluc: Negative / Ketones: Negative / Bili: Negative / Urobili: 0.2 / Protein :Negative / Nitrites: Negative / Leuk.Est: Trace<!> / RBC: 0 / WBC: 3 / Sq Epi:  / Non Sq Epi: 0 / Bacteria Negative         --------------------------------------------------------------------------------------------    I&O's Summary                    RADIOLOGY & ADDITIONAL STUDIES:  ---------------------------------------------------------------------------------------  < from: CT Head No Cont (02.07.24 @ 14:48) >    IMPRESSION:    1.  No evidence of acute intracranial pathology.    2.  Mild/moderate chronic microvascular changes.    --- End of Report ---        < end of copied text >  < from: CT Cervical Spine No Cont (24 @ 14:55) >  IMPRESSION:    1.  The study is significantly limited due to patient motion. The study   may be repeated if clinically warranted.    2.  No obvious acute fractures or subluxation.    3.  Severe degenerative changes.    --- End of Report ---    < end of copied text >  < from: CT Chest w/ IV Cont (24 @ 14:58) >    IMPRESSION:    Areas of consolidation within the right middle lobe and lower lobes.   Small right pleural effusion with adjacent compressive atelectasis. Areas   of nodularity within the right lung not well-seen on prior study may be   inflammatory in nature. Follow-up CAT scan of thorax in 3-6 months time.    Other chronic changes as described.    --- End of Report ---    < end of copied text >

## 2024-02-07 NOTE — H&P ADULT - NSHPPHYSICALEXAM_GEN_ALL_CORE
GA: alert oriented x3  Heart: irregular rhythm  Lungs: crackles at base, no wheezing  Abdomen: soft non tender  LE: mild non pitting edema

## 2024-02-07 NOTE — ED PROVIDER NOTE - OBJECTIVE STATEMENT
83-year-old female with past medical history of CAD with CABG, hypertension, A-fib status post PPM on warfarin, and hypothyroidism who presents to the ED status post a fall.  The patient reports that she excellently rolled off the bed this morning around 2:30 AM, but denies LOC.  Nursing home staff found patient having hypoxia, so patient was brought to the ED for evaluation.  Patient denies headache, lightheadedness, dizziness, chest pain, shortness of breath, fever, nausea, vomiting, abdominal pain, urinary symptoms, and change with bowel movement.  Patient denies any pain or discomfort or injury elsewhere. 83-year-old female with past medical history of CAD with CABG, hypertension, A-fib status post PPM on warfarin, and hypothyroidism who presents to the ED status post a fall.  The patient reports that she accidentally rolled off the bed this morning around 2:30 AM, but denies LOC.  Nursing home staff found patient having hypoxia, so patient was brought to the ED for evaluation.  Patient denies headache, lightheadedness, dizziness, chest pain, shortness of breath, fever, nausea, vomiting, abdominal pain, urinary symptoms, and change with bowel movement.  Patient denies any pain or discomfort or injury elsewhere.

## 2024-02-07 NOTE — H&P ADULT - ASSESSMENT
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, c/b CHB s/p Micra opn 12/17, R thoracentesis for pleural effusion on 12/18/2023, , readmission at [facility] for MDR/ESBL Klebsiella bacteremia, positive urine and sputum cultures, s/p repeat R thoracentesis , possible infected pericardial effusion, presents to the ED status post a fall and hypoxia down to low 80s.    #Mechanical Fall  - Likely mechanical   - Trauma w/u negative  - Get orthostatic vitals  - EKG V-paced  - Interrogate device (EP cs)  - PT cs    #AHRF  #RSV Infection with possible superimposed R PNA  #Hx of HFpEF and COPD?  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  # CHB s/p Micra opn 12/17  - c/w metoprolol, rate controlled  - f/u INR level and dose warfarin accordingly  - V paced     #Hx of open heart surgery complicated by infected pleural effusion  # H/x ESBL Klebsiella bacteremia  - s/p abx during last admission    #normocytic anemia  - Get iron studies    #HTN  - c/w amlodipine  - c/w metoprolol    #constipation  - cont miralax and bisacodyl PRN        DVT ppx: warfarin  GI ppx: none  Diet: DASH  Activity: AAT  Dispo: tele

## 2024-02-07 NOTE — ED PROVIDER NOTE - CLINICAL SUMMARY MEDICAL DECISION MAKING FREE TEXT BOX
Independent interpretation of the EKG performed by MD Kay  Independent interpretation of the X-Ray film(s) performed by MD Kay Independent interpretation of the EKG performed by MD Kay  Independent interpretation of the X-Ray film(s) performed by MD Kay    (1) FALL; Given work up, exam, and history low suspicion for intracranial hemorrhage or trauma, carotid or vertebral artery dissection, intrathoracic trauma (pulmonary contusion, blunt cardiac trauma, pneumothorax, hemothorax, cardiac tamponade, rib fractures), intra abdominal trauma (no liver, spleen, or renal lacerations, doubt hollow viscus injury given soft abdomen on repeat exams, no free air seen, consistently normotensive), extremity fracture, extremity dislocation, compartment syndrome.    (2) FEVER; Patient hypoxic, otherwise VSS, labs with hypokalemia, XR, CT c/w PNA. This patient presents with dyspnea, most likely secondary to PNA. Presentation not consistent with acute cardiac etiologies to include ACS (non ischemic ekg), CHF, pericardial effusion / tamponade . Presentation not consistent with acute respiratory etiologies to include acute PE, pneumothorax , asthma, COPD exacerbation, allergic etiologies. Presentation also not consistent with non-cardiopulmonary causes to include toxidromes, metabolic etiologies such as acidemia or electrolyte derangements, neurologic causes (i.e. demyelinating diseases).    Plan to admit for further evaluation and management.

## 2024-02-07 NOTE — ED PROVIDER NOTE - ATTENDING APP SHARED VISIT CONTRIBUTION OF CARE
I have reviewed and agree with the mid-level note, except as documented in my note below.    82 y/o female h/o  w PMH afib on warfarin, CHF, MR and TR s/p MV replacement on 12/13, PSH also significant for PPM and spinal surgery, admitted 1/9-1/18 for COVID / resp failure now p/w fall, states fell out of bed this morning, also admits to recent cough x several days, states is compliant with home medications, denies head trauma, LOC, paresthesias or other injuries / complaints at present, denies hemoptysis, orthopnea, PND, chest pain, LE pain or swelling, recent immobilization or travel or other associated complaints at present. Old chart reviewed. I have reviewed and agree with the initial nursing note, except as documented in my note.    VSS, febrile, awake, alert, non-toxic appearing, Head NC / NT, PERRL / EOMI, no dental injury, no abrasions or lacerations, chest CTAB, chest wall NT, no w/r/r, +S1/S2, RRR, no m/r/g, abdomen soft, NT, ND, +BS, able to voluntarily actively rotate neck 45 degrees left and right on request, no midline spinal tenderness, no CVA tenderness, FROM x 4, no bony tenderness, alert and oriented to person, place and time, clear speech, cranial nerves II-XII are intact, upper and lower extremity strength is 5/5 and equal, gait normal.

## 2024-02-07 NOTE — CONSULT NOTE ADULT - ASSESSMENT
ASSESSMENT:  83y Female  w/ PMHx of HTN, iron deficiency anemia, afib, COPD, HF and s/p mitral & tricuspid valve replacement on Dec 2023 on coumadin seen as Trauma Alert s/p fall +HT, +LOC, +AC (coumadin) without any external signs of trauma. Patient reports that she fell from the bed at 2AM, afterwards she was feeling ok but then was noted be shaking. Vital Sings done in the nursing home were significant for O2 of 80% at RA. Patient was brought in for further evaluation.  Trauma assessment in ED: ABCs intact , GCS 15 , AAOx3,  MORA.     Injuries identified:   - no external signs of trauma noted  -   -     PLAN:   - Trauma Labs: (CBC, BMP, Coags, T&S, UA, EtOH level)  Additional studies:  EKG      Trauma Imaging to include the following: pending  - CXR, Pelvic Xray  - CT Head,  CT C-spine, CT Chest, CT Abd/Pelvis  - Extremity films: None    Additional consultations: pending  - Neurosurgery  - Orthopedics  - OMFS  - PT/Rehab/  - Hospitalist/Medicine     Disposition pending results of above labs and imaging  Above plan discussed with Trauma attending, Dr. Henson  , patient, patient family, and ED team  --------------------------------------------------------------------------------------  02-07-24 @ 13:47    TRAUMA SENIOR SPECTRA: [id number]  TRAUMA TEAM SPECTRA: [id number]   ASSESSMENT:  83y Female  w/ PMHx of HTN, iron deficiency anemia, afib, COPD, HF and s/p mitral & tricuspid valve replacement on Dec 2023 on coumadin seen as Trauma Alert s/p fall +HT, +LOC, +AC (coumadin) without any external signs of trauma. Patient reports that she fell from the bed at 2AM, afterwards she was feeling ok but then was noted be shaking. Vital Sings done in the nursing home were significant for O2 of 80% at RA. Patient was brought in for further evaluation.  Trauma assessment in ED: ABCs intact , GCS 15 , AAOx3,  MORA.     Injuries identified:   - no external signs of trauma noted  -   -     PLAN:   - No further trauma work up indicated, no traumatic injuries noted on PANSCAN  - Desaturation most likely secondary to upper respiratory infection, WBC 18 and febrile on initial VS  - Dispo as per ED  - If admitted will follow for a TTS 2/8    Disposition pending results of above labs and imaging  Above plan discussed with Trauma attending, Dr. Henson  , patient, patient family, and ED team  --------------------------------------------------------------------------------------  02-07-24 @ 13:47    TRAUMA SENIOR SPECTRA: [id number]  TRAUMA TEAM SPECTRA: [id number]

## 2024-02-07 NOTE — H&P ADULT - ATTENDING COMMENTS
Patient seen and examined at bedside independently of the residents. I read the resident's note and agree with the plan with the additions and corrections as noted below. My note supersedes the resident's note.     REVIEW OF SYSTEMS:  Negative except in HPI.     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 No fhx of asthma/copd, No fhx of liver and pulmonary disease. No fhx of hematological disorder.     Physical Exam:  GEN: No acute distress. Awake, Alert and oriented x 3.   Head: Atraumatic, Normocephalic.   Eye: PEERLA. No sclera icterus. EOMI.   ENT: Normal oropharynx, no thyromegaly, no mass, no lymphadenopathy.   LUNGS: Clear to auscultation bilaterally. No wheeze/rales/crackles.   HEART: Normal. S1/S2 present. RRR. No murmur/gallops.   ABD: Soft, non-tender, non-distended. Bowel sounds present.   EXT: 1+ pitting edema (LLE>RLE). No erythema. No tenderness.  Integumentary: No rash, No sore, No petechia.   NEURO: CN III-XII intact. Strength: 5/5 b/l ULE. Sensory intact b/l ULE.     Vital Signs Last 24 Hrs  T(C): 36.2 (2024 16:29), Max: 39.1 (2024 13:32)  T(F): 97.1 (2024 16:29), Max: 102.4 (2024 13:32)  HR: 60 (2024 16:29) (60 - 75)  BP: 115/56 (2024 16:29) (115/56 - 154/67)  BP(mean): --  RR: 18 (2024 16:29) (16 - 20)  SpO2: 98% (2024 16:29) (90% - 98%)    Parameters below as of 2024 16:29  Patient On (Oxygen Delivery Method): nasal cannula  O2 Flow (L/min): 2L      Please see the above notes for Labs and radiology.     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     Sepsis and Acute hypoxic respiratory failure 2/2 RSV with superimposed PNA  - CTA chest: Areas of consolidation within the right middle lobe and lower lobes. Small right pleural effusion with adjacent compressive atelectasis. Areas of nodularity within the right lung not well-seen on prior study may be inflammatory in nature.   - s/p Azithro, Levaquin and Ceftriaxone x 1 given in ED.   - will give 1 dose of Vancomycin. C/w Doxy and cefepime for now.   - f/u BCx, Procalcitonin, MRSA nares, Atypical PNA panel, sputum Cx   - speech and swallow eval.   - ID consult.     Hypokalemia  - replete prn. check serum Mg.     Paroxysmal A.fib - on coumadin. Monitor INR daily and dose coumadin accordingly.   CAD s/p CABG - c/w home med.   Rheumatic heart disease - f/u outpatient.   HTN /HLD - c/w home med.   Hypothyroidism - c/w home med.     DVT ppx: on warfarin   GI ppx: PPI   Diet: DASH diet. Speech and Swallow eval.   Activity: as tolerated.    Date seen by the attendin2024  Total time spent: 75 minutes.

## 2024-02-07 NOTE — ED PROVIDER NOTE - PROGRESS NOTE DETAILS
Patient is admitted for PNA and hypokalemia. Pt is aware of the plan and agrees. Pt has been endorsed to MAR.

## 2024-02-07 NOTE — ED PROVIDER NOTE - CARE PLAN
Principal Discharge DX:	PNA (pneumonia)  Secondary Diagnosis:	Hypokalemia   1 Principal Discharge DX:	PNA (pneumonia)  Secondary Diagnosis:	Hypokalemia  Secondary Diagnosis:	Fall  Secondary Diagnosis:	Closed head injury  Secondary Diagnosis:	Hypoxia

## 2024-02-07 NOTE — CONSULT NOTE ADULT - ATTENDING COMMENTS
Trauma/ACS Attending Note Attestation    Patient was examined and evaluated at the bedside at 3:15 PM.   Medications, radiological studies and all other relevant studies reviewed.     83y Female with PMH HTN, afib, COPD, HF, mitral and tricuspid valve replacement two months ago s/p fall from bed while on coumadin. Fell last night. Patient reports she was able to ambulate after the fall. No numbness, paresthesias. Brought in due to desaturation noted by nursing home. Family reports she was initially supposed to be discharged from nursing home today.    Vital Signs Last 24 Hrs  T(C): 36.2 (07 Feb 2024 16:29), Max: 39.1 (07 Feb 2024 13:32)  T(F): 97.1 (07 Feb 2024 16:29), Max: 102.4 (07 Feb 2024 13:32)  HR: 60 (07 Feb 2024 16:29) (60 - 75)  BP: 115/56 (07 Feb 2024 16:29) (115/56 - 154/67)  BP(mean): --  RR: 18 (07 Feb 2024 16:29) (16 - 20)  SpO2: 98% (07 Feb 2024 16:29) (90% - 98%)    Parameters below as of 07 Feb 2024 16:29  Patient On (Oxygen Delivery Method): nasal cannula  O2 Flow (L/min): 2    Primary:  Airway - intact  Breathing - breath sounds bilaterally  Circulation - 2+ throughout  Disability - GCS 15, moving all extremities  Exposure - patient was exposed    I independently performed a medically appropriate exam. I have made revisions to the physical exam in the note above and agree with the exam as written.                          9.1    18.91 )-----------( 343      ( 07 Feb 2024 13:56 )             29.0     02-07    132<L>  |  94<L>  |  14  ----------------------------<  123<H>  3.3<L>   |  24  |  0.7    Ca    8.6      07 Feb 2024 13:56    TPro  7.0  /  Alb  3.4<L>  /  TBili  1.1  /  DBili  x   /  AST  34  /  ALT  23  /  AlkPhos  152<H>  02-07      Blood Gas Venous - Lactate: 2.0 mmol/L (02-07 @ 15:23)      CXR reviewed and interpreted by me - blunting of R costophrenic angle suggesting effusion  CTH/Csp reviewed and interpreted by me - no acute traumatic head injury, significant motion artifact on C spine but no obvious injury  CT C/A/P reviewed and interpreted by me - R pleural effusion with consolidation in RLL and RML    Assessment/Plan:  83y Female PMH HTN, afib, COPD, HF, mitral and tricuspid valve replacement two months ago s/p fall from bed while on coumadin. No acute traumatic injuries. Labs notable for hypokalemia and hypokalemia. Pleural effusions, R lung consolidations, and ankle edema concerning for volume overload. Leukocytosis concerning for possible pneumonia in setting of consolidations. Recommend medical admission. Patient is hesitant to be admitted. Disposition per ED.    Godwin Henson MD  Trauma/ACS/Surgical Critical Care Attending

## 2024-02-07 NOTE — ED ADULT TRIAGE NOTE - CHIEF COMPLAINT QUOTE
BIBA from NH, pt. fell  out of the bed last night ,un witnessed  -loc pt on coumadin for heart sx on 12/13/2023

## 2024-02-07 NOTE — H&P ADULT - HISTORY OF PRESENT ILLNESS
82 yo F w PMH of AF on warfarin, rheumatic MV disease, severe MR and TR s/p bioprosthetic MV replacement and TV annuloplasty on 12/13/2023 at [facility], c/b CHB s/p Micra opn 12/17, R thoracentesis for pleural effusion on 12/18/2023, , readmission at [facility] for MDR/ESBL Klebsiella bacteremia, positive urine and sputum cultures, s/p repeat R thoracentesis , possible infected pericardial effusion, presents to the ED status post a fall and hypoxia down to low 80s.  The patient reports that she accidentally rolled off the bed this morning around 2:30 AM, but denies LOC.  Nursing home staff found patient having hypoxia, so patient was brought to the ED for evaluation. She reports that she has been having a new cough for the past few days with sputum production along with runny nose, congestion and SOB. Patient also reports chills but denies fever.  Patient denies headache, lightheadedness, dizziness, chest pain, fever, nausea, vomiting, abdominal pain, urinary symptoms, and change with bowel movement.  Patient denies any pain or discomfort or injury elsewhere.  In the ED, BP was 153/67, HR 75, and had a fever of 102.4 along with Spo2 with 95% on 3L NC. Labs showed WBC 19 with left shift, normocytic anemia, K 3.3, UA negative, RSV positive, with CT chest showing Right consolidation and nodularity. CT cervical spine showed no fractures despite the limitation of study. EKG showed Afib with Vpaced rhythm.  Detail Level: Simple

## 2024-02-07 NOTE — ED PROVIDER NOTE - PHYSICAL EXAMINATION
CONSTITUTIONAL: in no apparent distress.   HEAD: Normocephalic; atraumatic.   EYES: Pupils are round and reactive, extra-ocular muscles are intact. Eyelids are normal in appearance without swelling or lesions.   ENT: Hearing is intact with good acuity to spoken voice.  Patient is speaking clearly, not muffled and airway is intact.   RESPIRATORY: No signs of respiratory distress. Lung sounds are clear in all lobes bilaterally without rales, rhonchi, or wheezes.  CARDIOVASCULAR: Regular rate and rhythm.   GI: Abdomen is soft, non-tender, and without distention. Bowel sounds are present and normoactive in all four quadrants. No masses are noted.   BACK: No evidence of trauma or deformity. No midline tenderness. Normal ROM.   MS: Normal appearance and ROM in all four extremities. No tenderness to palpation and distal pulses are normal. Sensation to the upper and lower extremities is normal bilaterally.   NEURO: A & O x 3. Normal speech. No focal deficit.  PSYCHOLOGICAL: Appropriate mood and affect. Good judgement and insight.

## 2024-02-07 NOTE — H&P ADULT - NSHPLABSRESULTS_GEN_ALL_CORE
Complete Blood Count + Automated Diff (02.07.24 @ 13:56)   WBC Count: 18.91 K/uL  RBC Count: 3.44 M/uL  Hemoglobin: 9.1 g/dL  Hematocrit: 29.0 %  Mean Cell Volume: 84.3 fL  Mean Cell Hemoglobin: 26.5 pg  Mean Cell Hemoglobin Conc: 31.4 g/dL  Red Cell Distrib Width: 18.6 %  Platelet Count - Automated: 343 K/uL  MPV: 10.1 fL  Auto Neutrophil #: 16.60 K/uL  Auto Lymphocyte #: 0.17 K/uL  Auto Monocyte #: 1.82 K/uL  Auto Eosinophil #: 0.00 K/uL  Auto Basophil #: 0.00 K/uL  Auto Neutrophil %: 86.9: Differential percentages must be correlated with absolute numbers for   clinical significance. %  Auto Lymphocyte %: 0.9 %  Auto Monocyte %: 9.6 %  Auto Eosinophil %: 0.0 %  Auto Basophil %: 0.0 %Comprehensive Metabolic Panel (02.07.24 @ 13:56)   Sodium: 132 mmol/L  Potassium: 3.3: Slighty Hemolyzed use with Caution mmol/L  Chloride: 94 mmol/L  Carbon Dioxide: 24 mmol/L  Anion Gap: 14 mmol/L  Blood Urea Nitrogen: 14 mg/dL  Creatinine: 0.7 mg/dL  Glucose: 123 mg/dL  Calcium: 8.6 mg/dL  Protein Total: 7.0 g/dL  Albumin: 3.4 g/dL  Bilirubin Total: 1.1 mg/dL  Alkaline Phosphatase: 152 U/L  Aspartate Aminotransferase (AST/SGOT): 34: Hemolyzed. Interpret with caution U/L  Alanine Aminotransferase (ALT/SGPT): 23 U/L  eGFR: 86: The estimated glomerular filtration rate (eGFR) is calculated using the   2021 CKD-EPI creatinine equation, which does not have a coefficient for   race and is validated in individuals 18 years of age and older (N Engl J   Med 2021; 385:3621-2575). Creatinine-based eGFR may be inaccurate in   various situations including but not limited to extremes of muscle mass,   altered dietary protein intake, or medications that affect renal tubular   creatinine secretion. mL/min/1.73m2< from: 12 Lead ECG (02.07.24 @ 14:09) >    Ventricular Rate 75 BPM    Atrial Rate 75 BPM    QRS Duration 144 ms    Q-T Interval 478 ms    QTC Calculation(Bazett) 533 ms    R Axis 227 degrees    T Axis 24 degrees    Diagnosis Line Ventricular-paced rhythm  Probable Atrial fibrillation  Abnormal ECG    < end of copied text >    < from: CT Abdomen and Pelvis w/ IV Cont (02.07.24 @ 14:58) >      IMPRESSION:    Areas of consolidation within the right middle lobe and lower lobes.   Small right pleural effusion with adjacent compressive atelectasis. Areas   of nodularity within the right lung not well-seen on prior study may be   inflammatory in nature. Follow-up CAT scan of thorax in 3-6 months time.    Other chronic changes as described.    < end of copied text >

## 2024-02-07 NOTE — ED ADULT NURSE NOTE - PRO INTERPRETER NEED 2
- Daily weight:  same time every day, same clothing   - Low Salt (sodium) diet   - Watch fluid intake        Heart Failure Action Plan  A heart failure action plan helps you understand what to do when you have symptoms of heart failure. Your action plan is a color-coded plan that lists the symptoms to watch for and indicates what actions to take.  If you have symptoms in the red zone, you need medical care right away.  If you have symptoms in the yellow zone, you are having problems.  If you have symptoms in the green zone, you are doing well.  Follow the plan that was created by you and your health care provider. Review your plan each time you visit your health care provider.  Red zone  These signs and symptoms mean you should get medical help right away:  You have trouble breathing when resting.  You have a dry cough that is getting worse.  You have swelling or pain in your legs or abdomen that is getting worse.  You suddenly gain more than 2-3 lb (0.9-1.4 kg) in 24 hours, or more than 5 lb (2.3 kg) in a week. This amount may be more or less depending on your condition.  You have trouble staying awake or you feel confused.  You have chest pain.  You do not have an appetite.  You pass out.  You have worsening sadness or depression.  If you have any of these symptoms, call your local emergency services (911 in the [place] right away. Do not drive yourself to the hospital.  Yellow zone  These signs and symptoms mean your condition may be getting worse and you should make some changes:  You have trouble breathing when you are active, or you need to sleep with your head raised on extra pillows to help you breathe.  You have swelling in your legs or abdomen.  You gain 2-3 lb (0.9-1.4 kg) in 24 hours, or 5 lb (2.3 kg) in a week. This amount may be more or less depending on your condition.  You get tired easily.  You have trouble sleeping.  You have a dry cough.  If you have any of these symptoms:  Contact your health care  provider within the next day.  Your health care provider may adjust your medicines.  Green zone  These signs mean you are doing well and can continue what you are doing:  You do not have shortness of breath.  You have very little swelling or no new swelling.  Your weight is stable (no gain or loss).  You have a normal activity level.  You do not have chest pain or any other new symptoms.  Follow these instructions at home:  Take over-the-counter and prescription medicines only as told by your health care provider.  Weigh yourself daily. Your target weight is __________ lb (__________ kg).  Call your health care provider if you gain more than __________ lb (__________ kg) in 24 hours, or more than __________ lb (__________ kg) in a week.  Health care provider name: _____________________________________________________  Health care provider phone number: _____________________________________________________  Eat a heart-healthy diet. Work with a diet and nutrition specialist (dietitian) to create an eating plan that is best for you.  Keep all follow-up visits. This is important.  Where to find more information  American Heart Association:  Summary  A heart failure action plan helps you understand what to do when you have symptoms of heart failure.  Follow the action plan that was created by you and your health care provider.  Get help right away if you have any symptoms in the red zone.  This information is not intended to replace advice given to you by your health care provider. Make sure you discuss any questions you have with your health care provider.  Document Revised: 03/28/2023 Document Reviewed: 08/02/2021  Elsevier Patient Education © 2023 [employer]     English

## 2024-02-08 LAB
ALBUMIN SERPL ELPH-MCNC: 3 G/DL — LOW (ref 3.5–5.2)
ALP SERPL-CCNC: 145 U/L — HIGH (ref 30–115)
ALT FLD-CCNC: 25 U/L — SIGNIFICANT CHANGE UP (ref 0–41)
ANION GAP SERPL CALC-SCNC: 10 MMOL/L — SIGNIFICANT CHANGE UP (ref 7–14)
AST SERPL-CCNC: 37 U/L — SIGNIFICANT CHANGE UP (ref 0–41)
BASOPHILS # BLD AUTO: 0.03 K/UL — SIGNIFICANT CHANGE UP (ref 0–0.2)
BASOPHILS NFR BLD AUTO: 0.2 % — SIGNIFICANT CHANGE UP (ref 0–1)
BILIRUB SERPL-MCNC: 0.9 MG/DL — SIGNIFICANT CHANGE UP (ref 0.2–1.2)
BUN SERPL-MCNC: 13 MG/DL — SIGNIFICANT CHANGE UP (ref 10–20)
CALCIUM SERPL-MCNC: 7.8 MG/DL — LOW (ref 8.4–10.5)
CHLORIDE SERPL-SCNC: 96 MMOL/L — LOW (ref 98–110)
CHOLEST SERPL-MCNC: 75 MG/DL — SIGNIFICANT CHANGE UP
CO2 SERPL-SCNC: 22 MMOL/L — SIGNIFICANT CHANGE UP (ref 17–32)
CREAT SERPL-MCNC: 0.6 MG/DL — LOW (ref 0.7–1.5)
EGFR: 89 ML/MIN/1.73M2 — SIGNIFICANT CHANGE UP
EOSINOPHIL # BLD AUTO: 0 K/UL — SIGNIFICANT CHANGE UP (ref 0–0.7)
EOSINOPHIL NFR BLD AUTO: 0 % — SIGNIFICANT CHANGE UP (ref 0–8)
FERRITIN SERPL-MCNC: 284 NG/ML — SIGNIFICANT CHANGE UP (ref 13–330)
GLUCOSE SERPL-MCNC: 177 MG/DL — HIGH (ref 70–99)
HCT VFR BLD CALC: 25.4 % — LOW (ref 37–47)
HDLC SERPL-MCNC: 44 MG/DL — LOW
HGB BLD-MCNC: 8.2 G/DL — LOW (ref 12–16)
IMM GRANULOCYTES NFR BLD AUTO: 0.7 % — HIGH (ref 0.1–0.3)
INR BLD: 3.85 RATIO — HIGH (ref 0.65–1.3)
IRON SATN MFR SERPL: 12 % — LOW (ref 15–50)
IRON SATN MFR SERPL: 22 UG/DL — LOW (ref 35–150)
LIPID PNL WITH DIRECT LDL SERPL: 23 MG/DL — SIGNIFICANT CHANGE UP
LYMPHOCYTES # BLD AUTO: 1.11 K/UL — LOW (ref 1.2–3.4)
LYMPHOCYTES # BLD AUTO: 6.9 % — LOW (ref 20.5–51.1)
MCHC RBC-ENTMCNC: 27.3 PG — SIGNIFICANT CHANGE UP (ref 27–31)
MCHC RBC-ENTMCNC: 32.3 G/DL — SIGNIFICANT CHANGE UP (ref 32–37)
MCV RBC AUTO: 84.7 FL — SIGNIFICANT CHANGE UP (ref 81–99)
MONOCYTES # BLD AUTO: 0.25 K/UL — SIGNIFICANT CHANGE UP (ref 0.1–0.6)
MONOCYTES NFR BLD AUTO: 1.6 % — LOW (ref 1.7–9.3)
NEUTROPHILS # BLD AUTO: 14.58 K/UL — HIGH (ref 1.4–6.5)
NEUTROPHILS NFR BLD AUTO: 90.6 % — HIGH (ref 42.2–75.2)
NON HDL CHOLESTEROL: 31 MG/DL — SIGNIFICANT CHANGE UP
NRBC # BLD: 0 /100 WBCS — SIGNIFICANT CHANGE UP (ref 0–0)
PLATELET # BLD AUTO: 277 K/UL — SIGNIFICANT CHANGE UP (ref 130–400)
PMV BLD: 9.9 FL — SIGNIFICANT CHANGE UP (ref 7.4–10.4)
POTASSIUM SERPL-MCNC: 4.1 MMOL/L — SIGNIFICANT CHANGE UP (ref 3.5–5)
POTASSIUM SERPL-SCNC: 4.1 MMOL/L — SIGNIFICANT CHANGE UP (ref 3.5–5)
PROCALCITONIN SERPL-MCNC: 0.23 NG/ML — HIGH (ref 0.02–0.1)
PROCALCITONIN SERPL-MCNC: 0.28 NG/ML — HIGH (ref 0.02–0.1)
PROT SERPL-MCNC: 5.9 G/DL — LOW (ref 6–8)
PROTHROM AB SERPL-ACNC: >40 SEC — HIGH (ref 9.95–12.87)
RBC # BLD: 3 M/UL — LOW (ref 4.2–5.4)
RBC # FLD: 18.4 % — HIGH (ref 11.5–14.5)
SODIUM SERPL-SCNC: 128 MMOL/L — LOW (ref 135–146)
TIBC SERPL-MCNC: 182 UG/DL — LOW (ref 220–430)
TRIGL SERPL-MCNC: 40 MG/DL — SIGNIFICANT CHANGE UP
TROPONIN T, HIGH SENSITIVITY RESULT: 26 NG/L — HIGH (ref 6–13)
UIBC SERPL-MCNC: 160 UG/DL — SIGNIFICANT CHANGE UP (ref 110–370)
WBC # BLD: 16.09 K/UL — HIGH (ref 4.8–10.8)
WBC # FLD AUTO: 16.09 K/UL — HIGH (ref 4.8–10.8)

## 2024-02-08 PROCEDURE — 99232 SBSQ HOSP IP/OBS MODERATE 35: CPT

## 2024-02-08 RX ORDER — CEFTRIAXONE 500 MG/1
2000 INJECTION, POWDER, FOR SOLUTION INTRAMUSCULAR; INTRAVENOUS EVERY 24 HOURS
Refills: 0 | Status: COMPLETED | OUTPATIENT
Start: 2024-02-08 | End: 2024-02-14

## 2024-02-08 RX ORDER — CHLORHEXIDINE GLUCONATE 213 G/1000ML
1 SOLUTION TOPICAL DAILY
Refills: 0 | Status: DISCONTINUED | OUTPATIENT
Start: 2024-02-08 | End: 2024-02-14

## 2024-02-08 RX ADMIN — SPIRONOLACTONE 25 MILLIGRAM(S): 25 TABLET, FILM COATED ORAL at 05:25

## 2024-02-08 RX ADMIN — Medication 40 MILLIGRAM(S): at 05:25

## 2024-02-08 RX ADMIN — Medication 3 MILLILITER(S): at 14:00

## 2024-02-08 RX ADMIN — CHLORHEXIDINE GLUCONATE 1 APPLICATION(S): 213 SOLUTION TOPICAL at 11:12

## 2024-02-08 RX ADMIN — Medication 3 MILLILITER(S): at 07:56

## 2024-02-08 RX ADMIN — KETOTIFEN FUMARATE 1 DROP(S): 0.34 SOLUTION OPHTHALMIC at 11:12

## 2024-02-08 RX ADMIN — Medication 50 MILLIGRAM(S): at 05:25

## 2024-02-08 RX ADMIN — Medication 25 MICROGRAM(S): at 05:25

## 2024-02-08 RX ADMIN — POLYETHYLENE GLYCOL 3350 17 GRAM(S): 17 POWDER, FOR SOLUTION ORAL at 11:12

## 2024-02-08 RX ADMIN — AMLODIPINE BESYLATE 5 MILLIGRAM(S): 2.5 TABLET ORAL at 05:25

## 2024-02-08 RX ADMIN — Medication 100 MILLIGRAM(S): at 05:26

## 2024-02-08 RX ADMIN — ATORVASTATIN CALCIUM 40 MILLIGRAM(S): 80 TABLET, FILM COATED ORAL at 22:21

## 2024-02-08 RX ADMIN — CEFTRIAXONE 100 MILLIGRAM(S): 500 INJECTION, POWDER, FOR SOLUTION INTRAMUSCULAR; INTRAVENOUS at 11:12

## 2024-02-08 RX ADMIN — Medication 3 MILLILITER(S): at 19:33

## 2024-02-08 RX ADMIN — CEFEPIME 100 MILLIGRAM(S): 1 INJECTION, POWDER, FOR SOLUTION INTRAMUSCULAR; INTRAVENOUS at 05:25

## 2024-02-08 NOTE — CONSULT NOTE ADULT - SUBJECTIVE AND OBJECTIVE BOX
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  The patient reports that she accidentally rolled off the bed this morning around 2:30 AM, but denies LOC.  Nursing home staff found patient having hypoxia, so patient was brought to the ED for evaluation. She reports that she has been having a new cough for the past few days with sputum production along with runny nose, congestion and SOB. Patient also reports chills but denies fever.  Patient denies headache, lightheadedness, dizziness, chest pain, fever, nausea, vomiting, abdominal pain, urinary symptoms, and change with bowel movement.  Patient denies any pain or discomfort or injury elsewhere.  In the ED, BP was 153/67, HR 75, and had a fever of 102.4 along with Spo2 with 95% on 3L NC. Labs showed WBC 19 with left shift, normocytic anemia, K 3.3, UA negative, RSV positive, with CT chest showing Right consolidation and nodularity. CT cervical spine showed no fractures despite the limitation of study. EKG showed Afib with Vpaced rhythm.  (07 Feb 2024 18:54)      INFECTIOUS DISEASE HISTORY:  ID consulted for     Currently ordered for:  cefepime   IVPB 2000 milliGRAM(s) IV Intermittent every 8 hours  doxycycline IVPB 100 milliGRAM(s) IV Intermittent every 12 hours      PMH  PAST MEDICAL & SURGICAL HISTORY:  HTN (hypertension)      Afib      Hypothyroid      FHx: spinal stenosis          FAMILY HISTORY  No pertinent family history in first degree relatives        SOCIAL HISTORY  Social History:        ROS  ***    VITALS:  T(F): 99.6, Max: 102.4 (02-07-24 @ 13:32)  HR: 60  BP: 126/56  RR: 18Vital Signs Last 24 Hrs  T(C): 37.6 (08 Feb 2024 04:19), Max: 39.1 (07 Feb 2024 13:32)  T(F): 99.6 (08 Feb 2024 04:19), Max: 102.4 (07 Feb 2024 13:32)  HR: 60 (08 Feb 2024 04:19) (60 - 75)  BP: 126/56 (08 Feb 2024 04:19) (115/56 - 154/67)  BP(mean): --  RR: 18 (08 Feb 2024 04:19) (16 - 20)  SpO2: 98% (07 Feb 2024 16:29) (90% - 98%)    Parameters below as of 07 Feb 2024 16:29  Patient On (Oxygen Delivery Method): nasal cannula  O2 Flow (L/min): 2      PHYSICAL EXAM:  ***    TESTS & MEASUREMENTS:                        8.2    16.09 )-----------( 277      ( 08 Feb 2024 05:59 )             25.4     02-08    128<L>  |  96<L>  |  13  ----------------------------<  177<H>  4.1   |  22  |  0.6<L>    Ca    7.8<L>      08 Feb 2024 05:59    TPro  5.9<L>  /  Alb  3.0<L>  /  TBili  0.9  /  DBili  x   /  AST  37  /  ALT  25  /  AlkPhos  145<H>  02-08      LIVER FUNCTIONS - ( 08 Feb 2024 05:59 )  Alb: 3.0 g/dL / Pro: 5.9 g/dL / ALK PHOS: 145 U/L / ALT: 25 U/L / AST: 37 U/L / GGT: x           Urinalysis Basic - ( 08 Feb 2024 05:59 )    Color: x / Appearance: x / SG: x / pH: x  Gluc: 177 mg/dL / Ketone: x  / Bili: x / Urobili: x   Blood: x / Protein: x / Nitrite: x   Leuk Esterase: x / RBC: x / WBC x   Sq Epi: x / Non Sq Epi: x / Bacteria: x        Culture - Blood (collected 01-08-24 @ 19:33)  Source: .Blood Blood-Peripheral  Final Report (01-14-24 @ 03:01):    No growth at 5 days    Culture - Blood (collected 01-08-24 @ 19:33)  Source: .Blood Blood-Peripheral  Final Report (01-14-24 @ 03:01):    No growth at 5 days        Blood Gas Venous - Lactate: 2.0 mmol/L (02-07-24 @ 15:23)      INFECTIOUS DISEASES TESTING  Procalcitonin, Serum: 0.04 ng/mL (01-09-24 @ 11:28)      INFLAMMATORY MARKERS      RADIOLOGY & ADDITIONAL TESTS:  I have personally reviewed the last Chest xray  CXR      CT  CT Chest w/ IV Cont:   ACC: [id number] EXAM:  CT ABDOMEN AND PELVIS IC   ORDERED BY: JENIFER RAYN     ACC: [id number] EXAM:  CT CHEST IC   ORDERED BY: JENIFER RYAN     PROCEDURE DATE:  02/07/2024          INTERPRETATION:  CLINICAL HISTORY: Trauma.    Technique: Contiguous axial CT images were obtained from the thoracic   inlet to the pubic symphysis. Intravenous contrast was administered.   Approximately 100 cc were administered.. Reconstructions in the coronal   and sagittal planes were also acquired.    COMPARISON: CT Chest dated CT chest from January 9, 2024.    FINDINGS:    LUNGS, PLEURA, AIRWAYS: Focal consolidation with air bronchograms are   seen within the right middle lobe as well as within the right lower lobe.   There is a small right pleural effusion with adjacent compressive   atelectasis. There is some intralobular septal thickening suggesting mild   CHF..    PULMONARY NODULES: 6 mm nodule is seen within the right middle lobe   laterally on series 2, image 20. On series 2, image 7 is a newarea of   nodularity within the right apex measuring up to 7 mm. Another irregular   area of nodularity seen within the right apex medially on series 2, image   9 measuring up to 7 mm. These areas of nodularity are not well seen on   the prior exam. There is no pneumothorax.    Calcification is seen of the wall of the trachea and bronchi. Trachea and   proximal bronchi are patent.    THORACIC NODES: Subcentimeter mediastinal prevascular adenopathy.    MEDIASTINUM/GREAT VESSELS: Atherosclerotic changes of the thoracic aorta.   Heart mildly enlarged. Cardiomegaly. Mild pericardial thickening.   Calcification of the mitral annulus. Coronary artery calcifications.    HEPATOBILIARY: No calcified gallstones within the gallbladder    SPLEEN: Focal splenic calcifications. Spleen otherwise within normal   limits.    Adrenal glands are within normal limits.    Pancreas is normal in contour.    Kidneys: Symmetric enhancement of both kidneys with no hydronephrosis.    Pelvic: Moderate distention the urinary bladder. Periuterine   calcifications.      Bowel: Moderate fecal retention. No bowel obstruction. The appendix is   unremarkable.      BONES/SOFT TISSUES: The bones are osteopenic. Transpedicular screws   traversing L4 and L5. There is mild scoliosis. Status post sternotomy.   Degenerative changes are seen of the shoulders.      IMPRESSION:    Areas of consolidation within the right middle lobe and lower lobes.   Small right pleural effusion with adjacent compressive atelectasis. Areas   of nodularity within the right lung not well-seen on prior study may be   inflammatory in nature. Follow-up CAT scan of thorax in 3-6 months time.    Other chronic changes as described.    --- End of Report ---            PRANEETH ALFRED MD; Attending Radiologist  This document has been electronically signed. Feb 7 2024  3:55PM (02-07-24 @ 14:58)      CARDIOLOGY TESTING  12 Lead ECG:   Ventricular Rate 75 BPM    Atrial Rate 75 BPM    QRS Duration 144 ms    Q-T Interval 478 ms    QTC Calculation(Bazett) 533 ms    R Axis 227 degrees    T Axis 24 degrees    Diagnosis Line Ventricular-paced rhythm  Probable Atrial fibrillation  Abnormal ECG    Confirmed by JANEL ROBINS, [facility] ([id number]) on 2/7/2024 2:57:52 PM (02-07-24 @ 14:09)      MEDICATIONS  albuterol/ipratropium for Nebulization 3 Nebulizer every 6 hours  amLODIPine   Tablet 5 Oral daily  atorvastatin 40 Oral at bedtime  cefepime   IVPB 2000 IV Intermittent every 8 hours  chlorhexidine 2% Cloths 1 Topical daily  doxycycline IVPB 100 IV Intermittent every 12 hours  furosemide    Tablet 40 Oral daily  ketotifen 0.025% Ophthalmic Solution 1 Both EYES daily  levothyroxine 25 Oral daily  methylPREDNISolone sodium succinate Injectable 40 IV Push daily  metoprolol succinate ER 50 Oral daily  polyethylene glycol 3350 17 Oral daily  spironolactone 25 Oral daily        ANTIBIOTICS:  cefepime   IVPB 2000 milliGRAM(s) IV Intermittent every 8 hours  doxycycline IVPB 100 milliGRAM(s) IV Intermittent every 12 hours      ALLERGIES:  No Known Allergies      ASSESSMENT  83y F admitted with Pneumonia due to infectious organism      HTN (hypertension)    Afib    Hypothyroid

## 2024-02-08 NOTE — PROGRESS NOTE ADULT - ASSESSMENT
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     Sepsis and Acute hypoxic respiratory failure 2/2 RSV with superimposed PNA  - CTA chest: Areas of consolidation within the right middle lobe and lower lobes. Small right pleural effusion with adjacent compressive atelectasis. Areas of nodularity within the right lung not well-seen on prior study may be inflammatory in nature.   - s/p Azithro, Levaquin and Ceftriaxone x 1 given in ED.   - on ceftriaxone and levofloxacin   - f/u BCx, Procalcitonin, MRSA nares, Atypical PNA panel, sputum Cx   - speech and swallow eval.   - ID consult. , follow recommendations     Hypokalemia resolved   - replete prn  daily labs     Paroxysmal A.fib - on coumadin. Monitor INR daily and dose coumadin accordingly.   CAD s/p CABG - c/w home med.   Rheumatic heart disease - f/u outpatient.   HTN /HLD - c/w home med.   Hypothyroidism - c/w home med.     DVT ppx: on warfarin   GI ppx: PPI   Diet: DASH diet. Speech and Swallow eval.   Activity: as tolerated.    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     Sepsis and Acute hypoxic respiratory failure 2/2 RSV with superimposed PNA  - CTA chest: Areas of consolidation within the right middle lobe and lower lobes. Small right pleural effusion with adjacent compressive atelectasis. Areas of nodularity within the right lung not well-seen on prior study may be inflammatory in nature.   - s/p Azithro, Levaquin and Ceftriaxone x 1 given in ED.   - on ceftriaxone and levofloxacin   - f/u BCx, Procalcitonin, MRSA nares, Atypical PNA panel, sputum Cx   - speech and swallow eval.   - ID consult. , follow recommendations     Hypokalemia resolved   - replete prn  daily labs     Paroxysmal A.fib - on coumadin. Monitor INR daily and dose coumadin accordingly.   CAD s/p CABG - c/w home med.   Rheumatic heart disease - f/u outpatient.   HTN /HLD - c/w home med.   Hypothyroidism - c/w home med.     DVT ppx: on warfarin   GI ppx: PPI   Diet: DASH diet. Speech and Swallow eval.   Activity: as tolerated.    follow up: monitor sodium, follow pending labs, procalcitonin, sputum/blood cultures. monitor sodium level. daily INR check. coumadin as per pharmacy   follow ID recommendations

## 2024-02-08 NOTE — CHART NOTE - NSCHARTNOTEFT_GEN_A_CORE
Trauma tertiary Survey    Patient seen and examined. No new complaints at this time.     PHYSICAL EXAM:  Craniofacial: Atraumatic, No deformity  Eyes: Pupil Size equal B/L and RTL. EOMI  Oropharynx: Atraumatic  Neck: Non-tender, No deformity, Trachea midline.  Chest: Equal breath sounds, non-tender, No deformity or crepitus  Heart: RSR, No murmurs, no rubs  Abdomen: Atraumatic, Non-tender, non-distended. No hepatosplenomegaly  Back: Spine non-tender, Atraumatic  Extremities: Atraumatic, no deformities, normal pulses, moving all extremities   Neurologic: CN intact, Motor intact throughout. Sensation intact/normal throughout.  Psych: Mood and affect normal. Judgment and insight normal      All images/reports reviewed. No further traumatic work-up warranted.

## 2024-02-08 NOTE — PHARMACOTHERAPY INTERVENTION NOTE - COMMENTS
Recommended to order a Legionella urinary antigen since patient has been empirically started on azithromycin for the treatment of pneumonia.      Leidy Prather, PharmD   Clinical Pharmacy Specialist, Infectious Diseases  Tele-Antimicrobial Stewardship Program (Tele-ASP)  Tele-ASP Phone: [phone]   Recommended to order a Legionella urinary antigen since patient has been empirically started on doxycycline for the treatment of pneumonia.      Leidy Prather, PharmD   Clinical Pharmacy Specialist, Infectious Diseases  Tele-Antimicrobial Stewardship Program (Tele-ASP)  Tele-ASP Phone: [phone]

## 2024-02-08 NOTE — SWALLOW BEDSIDE ASSESSMENT ADULT - SLP PERTINENT HISTORY OF CURRENT PROBLEM
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 Sepsis and Acute hypoxic respiratory failure 2/2 RSV with superimposed PNA

## 2024-02-08 NOTE — PHYSICAL THERAPY INITIAL EVALUATION ADULT - ADDITIONAL COMMENTS
PLOF was walking without AD. Daughter Vee reported patient will live with her. 2 stairs to enter house, none inside.

## 2024-02-08 NOTE — PHYSICAL THERAPY INITIAL EVALUATION ADULT - GENERAL OBSERVATIONS, REHAB EVAL
Daughter Vee present and observed patient's mobility. PT IE 1:15-1:45pm. Patient left in sitting in NAD. +call bell, +chair alarm, +telemetry, +prima fit.

## 2024-02-08 NOTE — PATIENT PROFILE ADULT - DO YOU EVER NEED HELP READING HOSPITAL MATERIALS?
Raloxifene HCL 60mg      Last Written Prescription Date:  2/3/17  Last Fill Quantity: 90,   # refills: 3  Last Office Visit: 2/3/17-annual, 7/11/17- vaginal problem  Future Office visit:    Next 5 appointments (look out 90 days)     Feb 15, 2018  9:00 AM CST   PHYSICAL with Girma Salmeron MD   [facility] ([facility])    [address] [place] [id number]   [phone]                   Medication is being filled for 1 time refill only due to:  Patient needs to be seen because it has been more than one year since last visit.   Pt due for annual, appt scheduled,3 month supply sent for insurance purposes.    no

## 2024-02-08 NOTE — PROGRESS NOTE ADULT - SUBJECTIVE AND OBJECTIVE BOX
SORAYA KING  83y, Female  Allergy: No Known Allergies    Hospital Day: 1d    Patient seen and examined earlier today. no acute issues ovenight    PMH/PSH:  PAST MEDICAL & SURGICAL HISTORY:  HTN (hypertension)      Afib      Hypothyroid      FHx: spinal stenosis          LAST 24-Hr EVENTS:    VITALS:  T(F): 95.9 (02-08-24 @ 14:26), Max: 99.6 (02-08-24 @ 04:19)  HR: 86 (02-08-24 @ 14:26)  BP: 120/60 (02-08-24 @ 14:26) (115/56 - 138/65)  RR: 18 (02-08-24 @ 14:26)  SpO2: 99% (02-08-24 @ 11:39)          TESTS & MEASUREMENTS:  Weight/BMI  60 (02-07-24 @ 13:32)  25.8 (02-07-24 @ 13:32)    02-07-24 @ 07:01  -  02-08-24 @ 07:00  --------------------------------------------------------  IN: 0 mL / OUT: 175 mL / NET: -175 mL    02-08-24 @ 07:01  -  02-08-24 @ 14:56  --------------------------------------------------------  IN: 600 mL / OUT: 800 mL / NET: -200 mL                            8.2    16.09 )-----------( 277      ( 08 Feb 2024 05:59 )             25.4     PT/INR - ( 07 Feb 2024 21:06 )   PT: 33.40 sec;   INR: 2.90 ratio           INR: 2.90 ratio (02-07-24 @ 21:06)    02-08    128<L>  |  96<L>  |  13  ----------------------------<  177<H>  4.1   |  22  |  0.6<L>    Ca    7.8<L>      08 Feb 2024 05:59    TPro  5.9<L>  /  Alb  3.0<L>  /  TBili  0.9  /  DBili  x   /  AST  37  /  ALT  25  /  AlkPhos  145<H>  02-08    LIVER FUNCTIONS - ( 08 Feb 2024 05:59 )  Alb: 3.0 g/dL / Pro: 5.9 g/dL / ALK PHOS: 145 U/L / ALT: 25 U/L / AST: 37 U/L / GGT: x                 Urinalysis Basic - ( 08 Feb 2024 05:59 )    Color: x / Appearance: x / SG: x / pH: x  Gluc: 177 mg/dL / Ketone: x  / Bili: x / Urobili: x   Blood: x / Protein: x / Nitrite: x   Leuk Esterase: x / RBC: x / WBC x   Sq Epi: x / Non Sq Epi: x / Bacteria: x                            RADIOLOGY, ECG, & ADDITIONAL TESTS:  12 Lead ECG:   Ventricular Rate 75 BPM    Atrial Rate 75 BPM    QRS Duration 144 ms    Q-T Interval 478 ms    QTC Calculation(Bazett) 533 ms    R Axis 227 degrees    T Axis 24 degrees    Diagnosis Line Ventricular-paced rhythm  Probable Atrial fibrillation  Abnormal ECG    Confirmed by JANEL ROBINS, [facility] ([id number]) on 2/7/2024 2:57:52 PM (02-07-24 @ 14:09)    CT Head No Cont:   ACC: [id number] EXAM:  CT BRAIN   ORDERED BY: JENIFER RYAN     PROCEDURE DATE:  02/07/2024          INTERPRETATION:  Clinical History / Reason for exam: Fall    Technique: Noncontrast head CT.  Contiguous unenhanced CT axial images of   the head from the base to the vertex with coronal and sagittal reformats.    Comparison: None available    Findings:    The ventricles and cortical sulci are within normal limits for age.    There are patchy hypodensities throughout the hemispheric white matter   without mass effect compatible with chronic microvascular changes.    There is no acute intracranial hemorrhage, extra-axial fluid collection   or midline shift.  Gray white matter differentiation is maintained.    Vascular calcifications are noted.    The visualized paranasal sinuses and mastoids are clear.    IMPRESSION:    1.  No evidence of acute intracranial pathology.    2.  Mild/moderate chronic microvascular changes.    --- End of Report ---            KARY DILLARD MD; Attending Radiologist  This document has been electronically signed. Feb 7 2024  3:04PM (02-07-24 @ 14:48)    RECENT DIAGNOSTIC ORDERS:  Xray Chest 1 View- PORTABLE-Routine: AM   Indication: FOLLOW UP.  Transport: Portable (02-08-24 @ 10:23)  Magnesium: AM Sched. Collection: 09-Feb-2024 04:30 (02-08-24 @ 10:11)  Comprehensive Metabolic Panel: AM Sched. Collection: 09-Feb-2024 04:30 (02-08-24 @ 10:11)  Complete Blood Count + Automated Diff: AM Sched. Collection: 09-Feb-2024 04:30 (02-08-24 @ 10:11)  Legionella pneumophila Antigen, Urine: Routine (02-08-24 @ 04:16)  Troponin T, High Sensitivity: AM Sched. Collection: 08-Feb-2024 04:30 (02-07-24 @ 23:30)  MRSA/MSSA PCR: Routine (02-07-24 @ 22:12)  Ferritin: AM Sched. Collection: 08-Feb-2024 04:30 (02-07-24 @ 19:39)  Procalcitonin, Serum: AM Sched. Collection: 08-Feb-2024 04:30 (02-07-24 @ 19:39)  Procalcitonin, Serum: 20:00 (02-07-24 @ 19:06)  Diet, DASH/TLC:   Sodium & Cholesterol Restricted (02-07-24 @ 18:54)  12 Lead ECG (02-07-24 @ 18:54)  A1C with Estimated Average Glucose: AM Sched. Collection: 08-Feb-2024 04:30 (02-07-24 @ 18:54)  Magnesium: STAT (02-07-24 @ 15:43)      MEDICATIONS:  MEDICATIONS  (STANDING):  albuterol/ipratropium for Nebulization 3 milliLiter(s) Nebulizer every 6 hours  amLODIPine   Tablet 5 milliGRAM(s) Oral daily  atorvastatin 40 milliGRAM(s) Oral at bedtime  cefTRIAXone   IVPB 2000 milliGRAM(s) IV Intermittent every 24 hours  chlorhexidine 2% Cloths 1 Application(s) Topical daily  furosemide    Tablet 40 milliGRAM(s) Oral daily  ketotifen 0.025% Ophthalmic Solution 1 Drop(s) Both EYES daily  levoFLOXacin IVPB      levothyroxine 25 MICROGram(s) Oral daily  methylPREDNISolone sodium succinate Injectable 40 milliGRAM(s) IV Push daily  metoprolol succinate ER 50 milliGRAM(s) Oral daily  polyethylene glycol 3350 17 Gram(s) Oral daily  spironolactone 25 milliGRAM(s) Oral daily    MEDICATIONS  (PRN):  acetaminophen     Tablet .. 650 milliGRAM(s) Oral every 6 hours PRN Temp greater or equal to 38C (100.4F), Mild Pain (1 - 3)  melatonin 3 milliGRAM(s) Oral at bedtime PRN Insomnia      HOME MEDICATIONS:  acetaminophen 325 mg oral capsule (02-07)  albuterol-budesonide 90 mcg-80 mcg/inh inhalation aerosol (02-07)  Aldactone 25 mg oral tablet (02-07)  alendronate 35 mg oral tablet (02-07)  amLODIPine 5 mg oral tablet (02-07)  benzonatate 200 mg oral capsule (02-07)  bisacodyl 5 mg oral tablet (02-07)  DuoNeb 0.5 mg-2.5 mg/3 mL inhalation solution (02-07)  Lasix 40 mg oral tablet (02-07)  Metoprolol Succinate ER 50 mg oral tablet, extended release (02-07)  MiraLax oral powder for reconstitution (02-07)  olopatadine 0.1% ophthalmic solution (02-07)  rosuvastatin 20 mg oral tablet (02-07)  Silvadene 1% topical cream (02-07)  Synthroid 25 mcg (0.025 mg) oral tablet (02-07)  warfarin 2.5 mg oral tablet (02-07)      PHYSICAL EXAM:  GENERAL: Patient lying on bed, comfoprtable   CHEST/LUNG: NVB, no wheezing   HEART: R1+R2, RRR  ABDOMEN: Soft. non tender, BS positive  EXTREMITIES:  no edema, Bruising  CNS: AAAx4. No cranial nerves deficit.

## 2024-02-08 NOTE — PHYSICAL THERAPY INITIAL EVALUATION ADULT - NSACTIVITYREC_GEN_A_PT
Patient able and agreeable to ambulate on daily basis with RN/PCA to maintain strength and mobility. Therex not indicated at this time. TRISTAN Garcia aware.

## 2024-02-08 NOTE — CONSULT NOTE ADULT - ASSESSMENT
not applicable IMPRESSION  #RSV bronchitis  Multilobar bacterial PNA RML/RLL  WBC 18.9  2/7 RSV positive  #Lactic acidosis  #Hyponatremia   #Obesity BMI (kg/m2): 25.8  #DM   #Abx allergy: No Known Allergies    Creatinine: 0.6 (02-08-24 @ 05:59)    Height (cm): 152.4 (02-07-24 @ 13:17)  Weight (kg): 60 (02-07-24 @ 13:32)    RECOMMENDATIONS  -Urine for strep pneumonia antigen  -Nares ORSA  -Sputum gm stain, cultures  -BCx  -Rocephin 2 gm iv q24h  -Levoquin 500 mg iv q24h     If any questions, please send a message or call on [email or web address] Teams  Please continue to update ID with any pertinent new laboratory or radiographic finding cough

## 2024-02-09 LAB
ALBUMIN SERPL ELPH-MCNC: 3.2 G/DL — LOW (ref 3.5–5.2)
ALP SERPL-CCNC: 140 U/L — HIGH (ref 30–115)
ALT FLD-CCNC: 28 U/L — SIGNIFICANT CHANGE UP (ref 0–41)
ANION GAP SERPL CALC-SCNC: 19 MMOL/L — HIGH (ref 7–14)
AST SERPL-CCNC: 42 U/L — HIGH (ref 0–41)
BASOPHILS # BLD AUTO: 0.02 K/UL — SIGNIFICANT CHANGE UP (ref 0–0.2)
BASOPHILS NFR BLD AUTO: 0.1 % — SIGNIFICANT CHANGE UP (ref 0–1)
BILIRUB SERPL-MCNC: 0.7 MG/DL — SIGNIFICANT CHANGE UP (ref 0.2–1.2)
BUN SERPL-MCNC: 19 MG/DL — SIGNIFICANT CHANGE UP (ref 10–20)
CALCIUM SERPL-MCNC: 8.1 MG/DL — LOW (ref 8.4–10.5)
CHLORIDE SERPL-SCNC: 97 MMOL/L — LOW (ref 98–110)
CO2 SERPL-SCNC: 19 MMOL/L — SIGNIFICANT CHANGE UP (ref 17–32)
CREAT SERPL-MCNC: 0.7 MG/DL — SIGNIFICANT CHANGE UP (ref 0.7–1.5)
CULTURE RESULTS: SIGNIFICANT CHANGE UP
EGFR: 86 ML/MIN/1.73M2 — SIGNIFICANT CHANGE UP
EOSINOPHIL # BLD AUTO: 0 K/UL — SIGNIFICANT CHANGE UP (ref 0–0.7)
EOSINOPHIL NFR BLD AUTO: 0 % — SIGNIFICANT CHANGE UP (ref 0–8)
GLUCOSE SERPL-MCNC: 120 MG/DL — HIGH (ref 70–99)
HCT VFR BLD CALC: 26.7 % — LOW (ref 37–47)
HGB BLD-MCNC: 8.5 G/DL — LOW (ref 12–16)
IMM GRANULOCYTES NFR BLD AUTO: 0.6 % — HIGH (ref 0.1–0.3)
INR BLD: 3.18 RATIO — HIGH (ref 0.65–1.3)
LYMPHOCYTES # BLD AUTO: 1.04 K/UL — LOW (ref 1.2–3.4)
LYMPHOCYTES # BLD AUTO: 5 % — LOW (ref 20.5–51.1)
MAGNESIUM SERPL-MCNC: 1.1 MG/DL — LOW (ref 1.8–2.4)
MCHC RBC-ENTMCNC: 27.2 PG — SIGNIFICANT CHANGE UP (ref 27–31)
MCHC RBC-ENTMCNC: 31.8 G/DL — LOW (ref 32–37)
MCV RBC AUTO: 85.6 FL — SIGNIFICANT CHANGE UP (ref 81–99)
MONOCYTES # BLD AUTO: 1.49 K/UL — HIGH (ref 0.1–0.6)
MONOCYTES NFR BLD AUTO: 7.2 % — SIGNIFICANT CHANGE UP (ref 1.7–9.3)
MRSA PCR RESULT.: NEGATIVE — SIGNIFICANT CHANGE UP
NEUTROPHILS # BLD AUTO: 18.1 K/UL — HIGH (ref 1.4–6.5)
NEUTROPHILS NFR BLD AUTO: 87.1 % — HIGH (ref 42.2–75.2)
NRBC # BLD: 0 /100 WBCS — SIGNIFICANT CHANGE UP (ref 0–0)
PLATELET # BLD AUTO: 277 K/UL — SIGNIFICANT CHANGE UP (ref 130–400)
PMV BLD: 10.1 FL — SIGNIFICANT CHANGE UP (ref 7.4–10.4)
POTASSIUM SERPL-MCNC: 3.7 MMOL/L — SIGNIFICANT CHANGE UP (ref 3.5–5)
POTASSIUM SERPL-SCNC: 3.7 MMOL/L — SIGNIFICANT CHANGE UP (ref 3.5–5)
PROT SERPL-MCNC: 6.3 G/DL — SIGNIFICANT CHANGE UP (ref 6–8)
PROTHROM AB SERPL-ACNC: 36.7 SEC — HIGH (ref 9.95–12.87)
RBC # BLD: 3.12 M/UL — LOW (ref 4.2–5.4)
RBC # FLD: 18.5 % — HIGH (ref 11.5–14.5)
SODIUM SERPL-SCNC: 135 MMOL/L — SIGNIFICANT CHANGE UP (ref 135–146)
SPECIMEN SOURCE: SIGNIFICANT CHANGE UP
WBC # BLD: 20.78 K/UL — HIGH (ref 4.8–10.8)
WBC # FLD AUTO: 20.78 K/UL — HIGH (ref 4.8–10.8)

## 2024-02-09 PROCEDURE — 71045 X-RAY EXAM CHEST 1 VIEW: CPT | Mod: 26

## 2024-02-09 PROCEDURE — 93010 ELECTROCARDIOGRAM REPORT: CPT

## 2024-02-09 PROCEDURE — 99232 SBSQ HOSP IP/OBS MODERATE 35: CPT

## 2024-02-09 RX ORDER — WARFARIN SODIUM 2.5 MG/1
2.5 TABLET ORAL AT BEDTIME
Refills: 0 | Status: COMPLETED | OUTPATIENT
Start: 2024-02-09 | End: 2024-02-09

## 2024-02-09 RX ORDER — WARFARIN SODIUM 2.5 MG/1
2.5 TABLET ORAL AT BEDTIME
Refills: 0 | Status: DISCONTINUED | OUTPATIENT
Start: 2024-02-09 | End: 2024-02-09

## 2024-02-09 RX ORDER — MAGNESIUM SULFATE 500 MG/ML
2 VIAL (ML) INJECTION ONCE
Refills: 0 | Status: COMPLETED | OUTPATIENT
Start: 2024-02-09 | End: 2024-02-09

## 2024-02-09 RX ORDER — QUETIAPINE FUMARATE 200 MG/1
25 TABLET, FILM COATED ORAL ONCE
Refills: 0 | Status: COMPLETED | OUTPATIENT
Start: 2024-02-09 | End: 2024-02-09

## 2024-02-09 RX ADMIN — CHLORHEXIDINE GLUCONATE 1 APPLICATION(S): 213 SOLUTION TOPICAL at 12:54

## 2024-02-09 RX ADMIN — SPIRONOLACTONE 25 MILLIGRAM(S): 25 TABLET, FILM COATED ORAL at 05:03

## 2024-02-09 RX ADMIN — Medication 40 MILLIGRAM(S): at 04:33

## 2024-02-09 RX ADMIN — Medication 25 MICROGRAM(S): at 05:03

## 2024-02-09 RX ADMIN — KETOTIFEN FUMARATE 1 DROP(S): 0.34 SOLUTION OPHTHALMIC at 12:53

## 2024-02-09 RX ADMIN — Medication 600 MILLIGRAM(S): at 02:54

## 2024-02-09 RX ADMIN — Medication 25 GRAM(S): at 16:19

## 2024-02-09 RX ADMIN — WARFARIN SODIUM 2.5 MILLIGRAM(S): 2.5 TABLET ORAL at 21:47

## 2024-02-09 RX ADMIN — Medication 40 MILLIGRAM(S): at 05:03

## 2024-02-09 RX ADMIN — Medication 3 MILLILITER(S): at 07:56

## 2024-02-09 RX ADMIN — Medication 3 MILLILITER(S): at 13:49

## 2024-02-09 RX ADMIN — ATORVASTATIN CALCIUM 40 MILLIGRAM(S): 80 TABLET, FILM COATED ORAL at 21:37

## 2024-02-09 RX ADMIN — CEFTRIAXONE 100 MILLIGRAM(S): 500 INJECTION, POWDER, FOR SOLUTION INTRAMUSCULAR; INTRAVENOUS at 12:46

## 2024-02-09 RX ADMIN — AMLODIPINE BESYLATE 5 MILLIGRAM(S): 2.5 TABLET ORAL at 05:03

## 2024-02-09 RX ADMIN — QUETIAPINE FUMARATE 25 MILLIGRAM(S): 200 TABLET, FILM COATED ORAL at 20:46

## 2024-02-09 NOTE — PROGRESS NOTE ADULT - SUBJECTIVE AND OBJECTIVE BOX
SORAYA KING  83y, Female    All available historical data reviewed    OVERNIGHT EVENTS:  no fevers  cough    ROS:  General: Denies rigors, nightsweats  HEENT: Denies headache, rhinorrhea, sore throat, eye pain  CV: Denies CP, palpitations  PULM: Denies wheezing, hemoptysis  GI: Denies hematemesis, hematochezia, melena  : Denies discharge, hematuria  MSK: Denies arthralgias, myalgias  SKIN: Denies rash, lesions  NEURO:  weakness  PSYCH: Denies depression, anxiety    VITALS:  T(F): 96.9, Max: 97.6 (02-08-24 @ 19:48)  HR: 60  BP: 126/60  RR: 27Vital Signs Last 24 Hrs  T(C): 36.1 (09 Feb 2024 04:49), Max: 36.4 (08 Feb 2024 19:48)  T(F): 96.9 (09 Feb 2024 04:49), Max: 97.6 (08 Feb 2024 19:48)  HR: 60 (09 Feb 2024 04:49) (60 - 86)  BP: 126/60 (09 Feb 2024 04:49) (120/58 - 126/60)  BP(mean): --  RR: 27 (09 Feb 2024 04:49) (18 - 38)  SpO2: 96% (09 Feb 2024 08:00) (92% - 99%)    Parameters below as of 09 Feb 2024 08:00  Patient On (Oxygen Delivery Method): nasal cannula  O2 Flow (L/min): 3      TESTS & MEASUREMENTS:                        8.5    20.78 )-----------( 277      ( 09 Feb 2024 06:31 )             26.7     02-09    135  |  97<L>  |  19  ----------------------------<  120<H>  3.7   |  19  |  0.7    Ca    8.1<L>      09 Feb 2024 06:31  Mg     1.1     02-09    TPro  6.3  /  Alb  3.2<L>  /  TBili  0.7  /  DBili  x   /  AST  42<H>  /  ALT  28  /  AlkPhos  140<H>  02-09    LIVER FUNCTIONS - ( 09 Feb 2024 06:31 )  Alb: 3.2 g/dL / Pro: 6.3 g/dL / ALK PHOS: 140 U/L / ALT: 28 U/L / AST: 42 U/L / GGT: x             Culture - Urine (collected 02-07-24 @ 14:03)  Source: Clean Catch Clean Catch (Midstream)  Final Report (02-09-24 @ 09:22):    >=3 organisms. Probable collection contamination.    Culture - Blood (collected 02-07-24 @ 13:56)  Source: .Blood Blood-Peripheral  Preliminary Report (02-08-24 @ 22:02):    No growth at 24 hours    Culture - Blood (collected 02-07-24 @ 13:56)  Source: .Blood Blood-Peripheral  Preliminary Report (02-08-24 @ 22:02):    No growth at 24 hours      Urinalysis Basic - ( 09 Feb 2024 06:31 )    Color: x / Appearance: x / SG: x / pH: x  Gluc: 120 mg/dL / Ketone: x  / Bili: x / Urobili: x   Blood: x / Protein: x / Nitrite: x   Leuk Esterase: x / RBC: x / WBC x   Sq Epi: x / Non Sq Epi: x / Bacteria: x          RADIOLOGY & ADDITIONAL TESTS:  Personal review of radiological diagnostics performed  Echo and EKG results noted when applicable.     MEDICATIONS:  acetaminophen     Tablet .. 650 milliGRAM(s) Oral every 6 hours PRN  albuterol/ipratropium for Nebulization 3 milliLiter(s) Nebulizer every 6 hours  amLODIPine   Tablet 5 milliGRAM(s) Oral daily  atorvastatin 40 milliGRAM(s) Oral at bedtime  cefTRIAXone   IVPB 2000 milliGRAM(s) IV Intermittent every 24 hours  chlorhexidine 2% Cloths 1 Application(s) Topical daily  furosemide    Tablet 40 milliGRAM(s) Oral daily  ketotifen 0.025% Ophthalmic Solution 1 Drop(s) Both EYES daily  levothyroxine 25 MICROGram(s) Oral daily  melatonin 3 milliGRAM(s) Oral at bedtime PRN  methylPREDNISolone sodium succinate Injectable 40 milliGRAM(s) IV Push daily  metoprolol succinate ER 50 milliGRAM(s) Oral daily  polyethylene glycol 3350 17 Gram(s) Oral daily  spironolactone 25 milliGRAM(s) Oral daily      ANTIBIOTICS:  cefTRIAXone   IVPB 2000 milliGRAM(s) IV Intermittent every 24 hours

## 2024-02-09 NOTE — PROGRESS NOTE ADULT - SUBJECTIVE AND OBJECTIVE BOX
24H events:    Patient is a 83y old Female who presents with a chief complaint of Hypoxia and Fall (08 Feb 2024 14:56)    Primary diagnosis of PNA (pneumonia)    Today is hospital day 2d. This morning patient was seen and examined at bedside, resting comfortably in bed.    No acute or major events overnight.    Code Status:    Family communication:  Contact date:  Name of person contacted:  Relationship to patient:  Communication details:  What matters most:    PAST MEDICAL & SURGICAL HISTORY  HTN (hypertension)    Afib    Hypothyroid    FHx: spinal stenosis      SOCIAL HISTORY:  Social History:      ALLERGIES:  No Known Allergies    MEDICATIONS:  STANDING MEDICATIONS  albuterol/ipratropium for Nebulization 3 milliLiter(s) Nebulizer every 6 hours  amLODIPine   Tablet 5 milliGRAM(s) Oral daily  atorvastatin 40 milliGRAM(s) Oral at bedtime  cefTRIAXone   IVPB 2000 milliGRAM(s) IV Intermittent every 24 hours  chlorhexidine 2% Cloths 1 Application(s) Topical daily  furosemide    Tablet 40 milliGRAM(s) Oral daily  ketotifen 0.025% Ophthalmic Solution 1 Drop(s) Both EYES daily  levoFLOXacin IVPB 500 milliGRAM(s) IV Intermittent every 24 hours  levoFLOXacin IVPB      levothyroxine 25 MICROGram(s) Oral daily  methylPREDNISolone sodium succinate Injectable 40 milliGRAM(s) IV Push daily  metoprolol succinate ER 50 milliGRAM(s) Oral daily  polyethylene glycol 3350 17 Gram(s) Oral daily  spironolactone 25 milliGRAM(s) Oral daily    PRN MEDICATIONS  acetaminophen     Tablet .. 650 milliGRAM(s) Oral every 6 hours PRN  melatonin 3 milliGRAM(s) Oral at bedtime PRN    VITALS:   T(F): 96.9  HR: 60  BP: 126/60  RR: 27  SpO2: 96%    PHYSICAL EXAM:    GENERAL: Patient lying on bed, comfoprtable . 2L NC o2  CHEST/LUNG: NVB, wheezing B/L  HEART: R1+R2, RRR  ABDOMEN: Soft. non tender, BS positive  EXTREMITIES:  no edema, Bruising  CNS: AAAx4. No cranial nerves deficit.         (  ) Indwelling Nair Catheter:   Date insterted:    Reason (  ) Critical illness     (  ) urinary retention    (  ) Accurate Ins/Outs Monitoring     (  ) CMO patient    (  ) Central Line:   Date inserted:  Location: (  ) Right IJ     (  ) Left IJ     (  ) Right Fem     (  ) Left Fem    (  ) SPC        (  ) pigtail       (  ) PEG tube       (  ) colostomy       (  ) jejunostomy  (  ) U-Dall    LABS:                        8.5    20.78 )-----------( 277      ( 09 Feb 2024 06:31 )             26.7     02-09    135  |  97<L>  |  19  ----------------------------<  120<H>  3.7   |  19  |  0.7    Ca    8.1<L>      09 Feb 2024 06:31  Mg     1.1     02-09    TPro  6.3  /  Alb  3.2<L>  /  TBili  0.7  /  DBili  x   /  AST  42<H>  /  ALT  28  /  AlkPhos  140<H>  02-09    PT/INR - ( 08 Feb 2024 17:05 )   PT: >40.00 sec;   INR: 3.85 ratio           Urinalysis Basic - ( 09 Feb 2024 06:31 )    Color: x / Appearance: x / SG: x / pH: x  Gluc: 120 mg/dL / Ketone: x  / Bili: x / Urobili: x   Blood: x / Protein: x / Nitrite: x   Leuk Esterase: x / RBC: x / WBC x   Sq Epi: x / Non Sq Epi: x / Bacteria: x            Culture - Urine (collected 07 Feb 2024 14:03)  Source: Clean Catch Clean Catch (Midstream)  Final Report (09 Feb 2024 09:22):    >=3 organisms. Probable collection contamination.    Culture - Blood (collected 07 Feb 2024 13:56)  Source: .Blood Blood-Peripheral  Preliminary Report (08 Feb 2024 22:02):    No growth at 24 hours    Culture - Blood (collected 07 Feb 2024 13:56)  Source: .Blood Blood-Peripheral  Preliminary Report (08 Feb 2024 22:02):    No growth at 24 hours          RADIOLOGY:

## 2024-02-09 NOTE — PROGRESS NOTE ADULT - CARDIOVASCULAR
Post-Care Instructions: Patient instructed to not lie down for 4 hours and limit physical activity for 24 hours. Patient instructed not to travel by airplane for 48 hours. no new murmurs

## 2024-02-09 NOTE — PROGRESS NOTE ADULT - ASSESSMENT
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     #Sepsis and Acute hypoxic respiratory failure 2/2 RSV with superimposed PNA  - CTA chest: Areas of consolidation within the right middle lobe and lower lobes. Small right pleural effusion with adjacent compressive atelectasis. Areas of nodularity within the right lung not well-seen on prior study may be inflammatory in nature.   - s/p Azithro, Levaquin and Ceftriaxone x 1 given in ED.   - on ceftriaxone and levofloxacin   - f/u BCx, Procalcitonin, MRSA nares, Atypical PNA panel, sputum Cx   - speech and swallow eval---   - ID consult--- -Urine for strep pneumonia antigen, Nares ORSA. Sputum gm stain, cultures. BCx  - Rocephin 2 gm iv q24h  -Levoquin 500 mg iv q24h       Hypokalemia resolved   - replete prn  daily labs     Paroxysmal A.fib - on coumadin. Monitor INR daily and dose coumadin accordingly.   CAD s/p CABG - c/w home med.   Rheumatic heart disease - f/u outpatient.   HTN /HLD - c/w home med.   Hypothyroidism - c/w home med.     DVT ppx: on warfarin   GI ppx: PPI   Diet: DASH diet. Speech and Swallow eval.   Activity: as tolerated.    follow up: monitor sodium, follow pending labs, procalcitonin, sputum/blood cultures. monitor sodium level. daily INR check. 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     #Sepsis and Acute hypoxic respiratory failure 2/2 RSV with superimposed PNA  - CTA chest: Areas of consolidation within the right middle lobe and lower lobes. Small right pleural effusion with adjacent compressive atelectasis. Areas of nodularity within the right lung not well-seen on prior study may be inflammatory in nature.   - s/p Azithro, Levaquin and Ceftriaxone x 1 given in ED.   - on ceftriaxone and levofloxacin   - f/u BCx, Procalcitonin, MRSA nares, Atypical PNA panel, sputum Cx   - Blood and urine cultures negative 2/7  - speech and swallow eval--- thin liquid; pureed; regular solid  - ID consult--- -Urine for strep pneumonia antigen, Nares ORSA. Sputum gm stain, cultures. BCx  - Rocephin 2 gm iv q24h  - Levaquin 500 mg iv q24h.. DC levofloxacin due to Long QT. switch to doxycyline  .       #Paroxysmal A.fib - on coumadin.   - Monitor INR daily and dose coumadin accordingly.   - As per pt her target is 2.5- 3.5 INR     #Wheezing and SOB  - 2/8 night complained SOB  - Started on iv solumedrol  - oe slight wheezes, on suppl o2 NC 2L  - SWITCH TO PO STEROIDS 2/9    #Long QT  - EKG shows QT of 532   - Due to Levofloxacin  - stopped Levofloxacin 2/9. switched to Doxycycline       #CAD s/p CABG   - c/w home med.     #Rheumatic heart disease   - f/u outpatient.     #HTN /HLD   - c/w home med.     #Hypothyroidism   - c/w home med.     DVT ppx: on warfarin   GI ppx: PPI   Diet: DASH diet. Speech and Swallow eval.   Activity: as tolerated.    follow up: monitor sodium, follow pending labs, procalcitonin, sputum/blood cultures. monitor sodium level. daily INR check. coumadin as per pharmacy

## 2024-02-09 NOTE — PROGRESS NOTE ADULT - ASSESSMENT
IMPRESSION  #RSV bronchitis  Multilobar bacterial PNA RML/RLL  WBC 20.7  2/7 RSV positive  2/7 BCx NG  2/7 UCx NG  #Lactic acidosis  #Hyponatremia   #Obesity BMI (kg/m2): 25.8  #DM   #Abx allergy: No Known Allergies    Creatinine: 0.6 (02-08-24 @ 05:59)    Height (cm): 152.4 (02-07-24 @ 13:17)  Weight (kg): 60 (02-07-24 @ 13:32)    RECOMMENDATIONS  -Urine for strep pneumonia antigen  -Nares ORSA  -Sputum gm stain, cultures  -Rocephin 2 gm iv q24h  -d/c Levoquin   -start Doxycycline 100 mg iv q12h    If any questions, please send a message or call on [employer] Teams  Please continue to update ID with any pertinent new laboratory or radiographic finding

## 2024-02-09 NOTE — PHARMACOTHERAPY INTERVENTION NOTE - COMMENTS
Patient started on levofloxacin 750 mg IV q24h for possible pneumonia. EKG on 2/7 shows QTC = 533. Recommend discontinuing levofloxacin for now and repeating EKG. If necessary consider alternative antibiotic. Per ID discontinue levofloxacin and start doxycyline 100 mg IV BID.

## 2024-02-10 LAB
ALBUMIN SERPL ELPH-MCNC: 3.3 G/DL — LOW (ref 3.5–5.2)
ALP SERPL-CCNC: 138 U/L — HIGH (ref 30–115)
ALT FLD-CCNC: 31 U/L — SIGNIFICANT CHANGE UP (ref 0–41)
ANION GAP SERPL CALC-SCNC: 11 MMOL/L — SIGNIFICANT CHANGE UP (ref 7–14)
APTT BLD: 38.2 SEC — SIGNIFICANT CHANGE UP (ref 27–39.2)
AST SERPL-CCNC: 36 U/L — SIGNIFICANT CHANGE UP (ref 0–41)
BASOPHILS # BLD AUTO: 0.01 K/UL — SIGNIFICANT CHANGE UP (ref 0–0.2)
BASOPHILS NFR BLD AUTO: 0.1 % — SIGNIFICANT CHANGE UP (ref 0–1)
BILIRUB SERPL-MCNC: 0.6 MG/DL — SIGNIFICANT CHANGE UP (ref 0.2–1.2)
BUN SERPL-MCNC: 16 MG/DL — SIGNIFICANT CHANGE UP (ref 10–20)
CALCIUM SERPL-MCNC: 8.2 MG/DL — LOW (ref 8.4–10.5)
CHLORIDE SERPL-SCNC: 98 MMOL/L — SIGNIFICANT CHANGE UP (ref 98–110)
CO2 SERPL-SCNC: 29 MMOL/L — SIGNIFICANT CHANGE UP (ref 17–32)
CREAT SERPL-MCNC: 0.6 MG/DL — LOW (ref 0.7–1.5)
EGFR: 89 ML/MIN/1.73M2 — SIGNIFICANT CHANGE UP
EOSINOPHIL # BLD AUTO: 0 K/UL — SIGNIFICANT CHANGE UP (ref 0–0.7)
EOSINOPHIL NFR BLD AUTO: 0 % — SIGNIFICANT CHANGE UP (ref 0–8)
GLUCOSE SERPL-MCNC: 116 MG/DL — HIGH (ref 70–99)
HCT VFR BLD CALC: 29 % — LOW (ref 37–47)
HGB BLD-MCNC: 9.2 G/DL — LOW (ref 12–16)
IMM GRANULOCYTES NFR BLD AUTO: 0.8 % — HIGH (ref 0.1–0.3)
INR BLD: 2.53 RATIO — HIGH (ref 0.65–1.3)
LEGIONELLA AG UR QL: NEGATIVE — SIGNIFICANT CHANGE UP
LYMPHOCYTES # BLD AUTO: 0.97 K/UL — LOW (ref 1.2–3.4)
LYMPHOCYTES # BLD AUTO: 7 % — LOW (ref 20.5–51.1)
MAGNESIUM SERPL-MCNC: 1.8 MG/DL — SIGNIFICANT CHANGE UP (ref 1.8–2.4)
MCHC RBC-ENTMCNC: 27.3 PG — SIGNIFICANT CHANGE UP (ref 27–31)
MCHC RBC-ENTMCNC: 31.7 G/DL — LOW (ref 32–37)
MCV RBC AUTO: 86.1 FL — SIGNIFICANT CHANGE UP (ref 81–99)
MONOCYTES # BLD AUTO: 1.61 K/UL — HIGH (ref 0.1–0.6)
MONOCYTES NFR BLD AUTO: 11.6 % — HIGH (ref 1.7–9.3)
NEUTROPHILS # BLD AUTO: 11.23 K/UL — HIGH (ref 1.4–6.5)
NEUTROPHILS NFR BLD AUTO: 80.5 % — HIGH (ref 42.2–75.2)
NRBC # BLD: 0 /100 WBCS — SIGNIFICANT CHANGE UP (ref 0–0)
PLATELET # BLD AUTO: 307 K/UL — SIGNIFICANT CHANGE UP (ref 130–400)
PMV BLD: 10.1 FL — SIGNIFICANT CHANGE UP (ref 7.4–10.4)
POTASSIUM SERPL-MCNC: 4 MMOL/L — SIGNIFICANT CHANGE UP (ref 3.5–5)
POTASSIUM SERPL-SCNC: 4 MMOL/L — SIGNIFICANT CHANGE UP (ref 3.5–5)
PROT SERPL-MCNC: 6.6 G/DL — SIGNIFICANT CHANGE UP (ref 6–8)
PROTHROM AB SERPL-ACNC: 29.1 SEC — HIGH (ref 9.95–12.87)
RBC # BLD: 3.37 M/UL — LOW (ref 4.2–5.4)
RBC # FLD: 18.3 % — HIGH (ref 11.5–14.5)
S PNEUM AG UR QL: NEGATIVE — SIGNIFICANT CHANGE UP
SODIUM SERPL-SCNC: 138 MMOL/L — SIGNIFICANT CHANGE UP (ref 135–146)
WBC # BLD: 13.93 K/UL — HIGH (ref 4.8–10.8)
WBC # FLD AUTO: 13.93 K/UL — HIGH (ref 4.8–10.8)

## 2024-02-10 PROCEDURE — 93010 ELECTROCARDIOGRAM REPORT: CPT

## 2024-02-10 PROCEDURE — 99232 SBSQ HOSP IP/OBS MODERATE 35: CPT

## 2024-02-10 RX ORDER — SODIUM CHLORIDE 9 MG/ML
3 INJECTION INTRAMUSCULAR; INTRAVENOUS; SUBCUTANEOUS EVERY 6 HOURS
Refills: 0 | Status: DISCONTINUED | OUTPATIENT
Start: 2024-02-10 | End: 2024-02-14

## 2024-02-10 RX ORDER — ALBUTEROL 90 UG/1
2.5 AEROSOL, METERED ORAL EVERY 6 HOURS
Refills: 0 | Status: DISCONTINUED | OUTPATIENT
Start: 2024-02-10 | End: 2024-02-14

## 2024-02-10 RX ORDER — WARFARIN SODIUM 2.5 MG/1
2.5 TABLET ORAL ONCE
Refills: 0 | Status: COMPLETED | OUTPATIENT
Start: 2024-02-10 | End: 2024-02-10

## 2024-02-10 RX ADMIN — Medication 40 MILLIGRAM(S): at 05:47

## 2024-02-10 RX ADMIN — POLYETHYLENE GLYCOL 3350 17 GRAM(S): 17 POWDER, FOR SOLUTION ORAL at 12:36

## 2024-02-10 RX ADMIN — Medication 25 MICROGRAM(S): at 05:46

## 2024-02-10 RX ADMIN — Medication 100 MILLIGRAM(S): at 05:47

## 2024-02-10 RX ADMIN — CHLORHEXIDINE GLUCONATE 1 APPLICATION(S): 213 SOLUTION TOPICAL at 12:36

## 2024-02-10 RX ADMIN — Medication 3 MILLILITER(S): at 09:12

## 2024-02-10 RX ADMIN — Medication 50 MILLIGRAM(S): at 05:46

## 2024-02-10 RX ADMIN — WARFARIN SODIUM 2.5 MILLIGRAM(S): 2.5 TABLET ORAL at 21:42

## 2024-02-10 RX ADMIN — Medication 40 MILLIGRAM(S): at 05:46

## 2024-02-10 RX ADMIN — Medication 100 MILLIGRAM(S): at 17:15

## 2024-02-10 RX ADMIN — SODIUM CHLORIDE 3 MILLILITER(S): 9 INJECTION INTRAMUSCULAR; INTRAVENOUS; SUBCUTANEOUS at 20:44

## 2024-02-10 RX ADMIN — CEFTRIAXONE 100 MILLIGRAM(S): 500 INJECTION, POWDER, FOR SOLUTION INTRAMUSCULAR; INTRAVENOUS at 12:35

## 2024-02-10 RX ADMIN — ALBUTEROL 2.5 MILLIGRAM(S): 90 AEROSOL, METERED ORAL at 13:49

## 2024-02-10 RX ADMIN — ALBUTEROL 2.5 MILLIGRAM(S): 90 AEROSOL, METERED ORAL at 20:33

## 2024-02-10 RX ADMIN — KETOTIFEN FUMARATE 1 DROP(S): 0.34 SOLUTION OPHTHALMIC at 12:36

## 2024-02-10 RX ADMIN — AMLODIPINE BESYLATE 5 MILLIGRAM(S): 2.5 TABLET ORAL at 05:47

## 2024-02-10 RX ADMIN — ATORVASTATIN CALCIUM 40 MILLIGRAM(S): 80 TABLET, FILM COATED ORAL at 21:43

## 2024-02-10 NOTE — PROGRESS NOTE ADULT - ASSESSMENT
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     #Sepsis and Acute hypoxic respiratory failure 2/2 RSV with superimposed PNA  - CTA chest: Areas of consolidation within the right middle lobe and lower lobes. Small right pleural effusion with adjacent compressive atelectasis. Areas of nodularity within the right lung not well-seen on prior study may be inflammatory in nature.   - s/p Azithro, Levaquin and Ceftriaxone x 1 given in ED.   - on ceftriaxone and levofloxacin   - f/u BCx, Procalcitonin, MRSA nares, Atypical PNA panel, sputum Cx   - Blood and urine cultures negative 2/7  - speech and swallow eval--- thin liquid; pureed; regular solid  - ID consult--- -Urine for strep pneumonia antigen, Nares ORSA. Sputum gm stain, cultures. BCx  - Rocephin 2 gm iv q24h  - Levaquin 500 mg iv q24h.. DC levofloxacin due to Long QT. switch to doxycyline  .       #Paroxysmal A.fib - on coumadin.   - Monitor INR daily and dose coumadin accordingly.   - As per pt her target is 2.5- 3.5 INR     #Wheezing and SOB  - 2/8 night complained SOB  - Started on iv solumedrol  - oe slight wheezes, on suppl o2 NC 2L  - SWITCH TO PO STEROIDS 2/9    #Long QT  - EKG shows QT of 532   - Due to Levofloxacin  - stopped Levofloxacin 2/9. switched to Doxycycline       #CAD s/p CABG   - c/w home med.     #Rheumatic heart disease   - f/u outpatient.     #HTN /HLD   - c/w home med.     #Hypothyroidism   - c/w home med.     DVT ppx: on warfarin   GI ppx: PPI   Diet: DASH diet. Speech and Swallow eval.   Activity: as tolerated.   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     #Sepsis and Acute hypoxic respiratory failure 2/2 RSV with superimposed PNA  - CTA chest: Areas of consolidation within the right middle lobe and lower lobes. Small right pleural effusion with adjacent compressive atelectasis. Areas of nodularity within the right lung not well-seen on prior study may be inflammatory in nature.   - s/p Azithro, Levaquin and Ceftriaxone x 1 given in ED.   - on ceftriaxone and levofloxacin   - f/u BCx, Procalcitonin, MRSA nares, Atypical PNA panel, sputum Cx   - Blood and urine cultures negative 2/7  - speech and swallow eval--- thin liquid; pureed; regular solid  - ID consult--- -Urine for strep pneumonia antigen, Nares ORSA. Sputum gm stain, cultures. BCx  - Rocephin 2 gm iv q24h  - Levaquin 500 mg iv q24h.. DC levofloxacin due to Long QT. switch to doxycyline  .       #Paroxysmal A.fib - on coumadin.   - Monitor INR daily and dose coumadin accordingly.   - As per pt her target is 2.5- 3.5 INR     #Wheezing and SOB  - 2/8 night complained SOB  - Started on iv solumedrol  - oe slight wheezes, on suppl o2 NC 2L  - SWITCH TO PO STEROIDS 2/9    #Long QT  - EKG shows QT of 532   - Due to Levofloxacin  - stopped Levofloxacin 2/9. switched to Doxycycline     #Pulmonary nodule seen on CT scan chest  - 6 mm nodule is seen within the right middle lobe On series 2,  a new area of nodularity within the right apex measuring up to 7 mm. Another irregular area of nodularity seen within the right apex 9 measuring up to 7 mm  - fu outpt         #CAD s/p CABG   - c/w home med.     #Rheumatic heart disease   - f/u outpatient.     #HTN /HLD   - c/w home med.     #Hypothyroidism   - c/w home med.     DVT ppx: on warfarin   GI ppx: PPI   Diet: DASH diet. Speech and Swallow eval.   Activity: as tolerated.

## 2024-02-10 NOTE — PROGRESS NOTE ADULT - ATTENDING COMMENTS
Medicine Attending Addendum  Patient was seen and examined with medicine team.  Nursing records reviewed. I agree with the resident/PA/NP's note including past medical history, home medications, social history, allergies, surgical history, family history, and review of system. I have reviewed relevant vitals, laboratory values, imaging studies, and microbiology.   - imaging suggestive of multiple pulmonary nodules. Discussed with patient to repeat CT chest in 3-6 months and possible cancer screening work up if nodules still persist after CT Chest is repeated.  - Cont current management except above modifications
Patient seen and examined independently with daughter and grandson at bedside.     PAST MEDICAL & SURGICAL HISTORY:  HTN (hypertension)  Afib  Hypothyroid    Vitals:  T(F): 97.3 (02-09-24 @ 14:45)  HR: 60 (02-09-24 @ 14:45)  BP: 148/61 (02-09-24 @ 14:45)  RR: 16 (02-09-24 @ 14:45)      TESTS & MEASUREMENTS:                        8.5    20.78 )-----------( 277      ( 09 Feb 2024 06:31 )             26.7     PT/INR - ( 09 Feb 2024 15:51 )   PT: 36.70 sec;   INR: 3.18 ratio           02-09    135  |  97<L>  |  19  ----------------------------<  120<H>  3.7   |  19  |  0.7    Ca    8.1<L>      09 Feb 2024 06:31  Mg     1.1     02-09    TPro  6.3  /  Alb  3.2<L>  /  TBili  0.7  /  DBili  x   /  AST  42<H>  /  ALT  28  /  AlkPhos  140<H>  02-09    LIVER FUNCTIONS - ( 09 Feb 2024 06:31 )  Alb: 3.2 g/dL / Pro: 6.3 g/dL / ALK PHOS: 140 U/L / ALT: 28 U/L / AST: 42 U/L / GGT: x           independent review of cardiac telemonitoring recordings by me.     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   Discussed with patient 's daughter and grandson at bedside, all Qs answered.

## 2024-02-10 NOTE — PROGRESS NOTE ADULT - SUBJECTIVE AND OBJECTIVE BOX
24H events:    Patient is a 83y old Female who presents with a chief complaint of Hypoxia and Fall (09 Feb 2024 14:11)    Primary diagnosis of PNA (pneumonia)    Today is hospital day 3d. This morning patient was seen and examined at bedside, resting comfortably in bed.    No acute or major events overnight.    Code Status:    Family communication:  Contact date:  Name of person contacted:  Relationship to patient:  Communication details:  What matters most:    PAST MEDICAL & SURGICAL HISTORY  HTN (hypertension)    Afib    Hypothyroid    FHx: spinal stenosis      SOCIAL HISTORY:  Social History:      ALLERGIES:  No Known Allergies    MEDICATIONS:  STANDING MEDICATIONS  albuterol/ipratropium for Nebulization 3 milliLiter(s) Nebulizer every 6 hours  amLODIPine   Tablet 5 milliGRAM(s) Oral daily  atorvastatin 40 milliGRAM(s) Oral at bedtime  cefTRIAXone   IVPB 2000 milliGRAM(s) IV Intermittent every 24 hours  chlorhexidine 2% Cloths 1 Application(s) Topical daily  doxycycline IVPB 100 milliGRAM(s) IV Intermittent every 12 hours  furosemide    Tablet 40 milliGRAM(s) Oral daily  ketotifen 0.025% Ophthalmic Solution 1 Drop(s) Both EYES daily  levothyroxine 25 MICROGram(s) Oral daily  metoprolol succinate ER 50 milliGRAM(s) Oral daily  polyethylene glycol 3350 17 Gram(s) Oral daily  predniSONE   Tablet 40 milliGRAM(s) Oral daily  spironolactone 25 milliGRAM(s) Oral daily    PRN MEDICATIONS  acetaminophen     Tablet .. 650 milliGRAM(s) Oral every 6 hours PRN  melatonin 3 milliGRAM(s) Oral at bedtime PRN  silver sulfADIAZINE 1% Cream 1 Application(s) Topical two times a day PRN    VITALS:   T(F): 98  HR: 60  BP: 144/67  RR: 18  SpO2: 100%    PHYSICAL EXAM:      GENERAL: Patient lying on bed, comfoprtable . 2L NC o2  CHEST/LUNG: NVB, wheezing B/L  HEART: R1+R2, RRR  ABDOMEN: Soft. non tender, BS positive  EXTREMITIES:  no edema, Bruising  CNS: AAAx4. No cranial nerves deficit.       AMPAC score:    (  ) Indwelling Nair Catheter:   Date insterted:    Reason (  ) Critical illness     (  ) urinary retention    (  ) Accurate Ins/Outs Monitoring     (  ) CMO patient    (  ) Central Line:   Date inserted:  Location: (  ) Right IJ     (  ) Left IJ     (  ) Right Fem     (  ) Left Fem    (  ) SPC        (  ) pigtail       (  ) PEG tube       (  ) colostomy       (  ) jejunostomy  (  ) U-Dall    LABS:                        9.2    13.93 )-----------( 307      ( 10 Feb 2024 07:49 )             29.0     02-10    138  |  98  |  16  ----------------------------<  116<H>  4.0   |  29  |  0.6<L>    Ca    8.2<L>      10 Feb 2024 07:49  Mg     1.8     02-10    TPro  6.6  /  Alb  3.3<L>  /  TBili  0.6  /  DBili  x   /  AST  36  /  ALT  31  /  AlkPhos  138<H>  02-10    PT/INR - ( 10 Feb 2024 07:49 )   PT: 29.10 sec;   INR: 2.53 ratio         PTT - ( 10 Feb 2024 07:49 )  PTT:38.2 sec  Urinalysis Basic - ( 10 Feb 2024 07:49 )    Color: x / Appearance: x / SG: x / pH: x  Gluc: 116 mg/dL / Ketone: x  / Bili: x / Urobili: x   Blood: x / Protein: x / Nitrite: x   Leuk Esterase: x / RBC: x / WBC x   Sq Epi: x / Non Sq Epi: x / Bacteria: x            Culture - Urine (collected 07 Feb 2024 14:03)  Source: Clean Catch Clean Catch (Midstream)  Final Report (09 Feb 2024 09:22):    >=3 organisms. Probable collection contamination.    Culture - Blood (collected 07 Feb 2024 13:56)  Source: .Blood Blood-Peripheral  Preliminary Report (09 Feb 2024 22:01):    No growth at 48 Hours    Culture - Blood (collected 07 Feb 2024 13:56)  Source: .Blood Blood-Peripheral  Preliminary Report (09 Feb 2024 22:01):    No growth at 48 Hours          RADIOLOGY:

## 2024-02-11 LAB
ALBUMIN SERPL ELPH-MCNC: 3.6 G/DL — SIGNIFICANT CHANGE UP (ref 3.5–5.2)
ALP SERPL-CCNC: 153 U/L — HIGH (ref 30–115)
ALT FLD-CCNC: 44 U/L — HIGH (ref 0–41)
ANION GAP SERPL CALC-SCNC: 17 MMOL/L — HIGH (ref 7–14)
AST SERPL-CCNC: 48 U/L — HIGH (ref 0–41)
BASOPHILS # BLD AUTO: 0.01 K/UL — SIGNIFICANT CHANGE UP (ref 0–0.2)
BASOPHILS NFR BLD AUTO: 0.1 % — SIGNIFICANT CHANGE UP (ref 0–1)
BILIRUB SERPL-MCNC: 0.7 MG/DL — SIGNIFICANT CHANGE UP (ref 0.2–1.2)
BUN SERPL-MCNC: 16 MG/DL — SIGNIFICANT CHANGE UP (ref 10–20)
CALCIUM SERPL-MCNC: 9 MG/DL — SIGNIFICANT CHANGE UP (ref 8.4–10.5)
CHLORIDE SERPL-SCNC: 94 MMOL/L — LOW (ref 98–110)
CO2 SERPL-SCNC: 29 MMOL/L — SIGNIFICANT CHANGE UP (ref 17–32)
CREAT SERPL-MCNC: 0.6 MG/DL — LOW (ref 0.7–1.5)
EGFR: 89 ML/MIN/1.73M2 — SIGNIFICANT CHANGE UP
EOSINOPHIL # BLD AUTO: 0.01 K/UL — SIGNIFICANT CHANGE UP (ref 0–0.7)
EOSINOPHIL NFR BLD AUTO: 0.1 % — SIGNIFICANT CHANGE UP (ref 0–8)
GLUCOSE SERPL-MCNC: 122 MG/DL — HIGH (ref 70–99)
HCT VFR BLD CALC: 34.4 % — LOW (ref 37–47)
HGB BLD-MCNC: 10.5 G/DL — LOW (ref 12–16)
IMM GRANULOCYTES NFR BLD AUTO: 0.7 % — HIGH (ref 0.1–0.3)
INR BLD: 3.4 RATIO — HIGH (ref 0.65–1.3)
LYMPHOCYTES # BLD AUTO: 12.4 % — LOW (ref 20.5–51.1)
LYMPHOCYTES # BLD AUTO: 2.05 K/UL — SIGNIFICANT CHANGE UP (ref 1.2–3.4)
MAGNESIUM SERPL-MCNC: 1.4 MG/DL — LOW (ref 1.8–2.4)
MCHC RBC-ENTMCNC: 26.4 PG — LOW (ref 27–31)
MCHC RBC-ENTMCNC: 30.5 G/DL — LOW (ref 32–37)
MCV RBC AUTO: 86.4 FL — SIGNIFICANT CHANGE UP (ref 81–99)
MONOCYTES # BLD AUTO: 1.19 K/UL — HIGH (ref 0.1–0.6)
MONOCYTES NFR BLD AUTO: 7.2 % — SIGNIFICANT CHANGE UP (ref 1.7–9.3)
NEUTROPHILS # BLD AUTO: 13.16 K/UL — HIGH (ref 1.4–6.5)
NEUTROPHILS NFR BLD AUTO: 79.5 % — HIGH (ref 42.2–75.2)
NRBC # BLD: 0 /100 WBCS — SIGNIFICANT CHANGE UP (ref 0–0)
PLATELET # BLD AUTO: 383 K/UL — SIGNIFICANT CHANGE UP (ref 130–400)
PMV BLD: 10.2 FL — SIGNIFICANT CHANGE UP (ref 7.4–10.4)
POTASSIUM SERPL-MCNC: 3.1 MMOL/L — LOW (ref 3.5–5)
POTASSIUM SERPL-SCNC: 3.1 MMOL/L — LOW (ref 3.5–5)
PROT SERPL-MCNC: 7.3 G/DL — SIGNIFICANT CHANGE UP (ref 6–8)
PROTHROM AB SERPL-ACNC: 39.3 SEC — HIGH (ref 9.95–12.87)
RBC # BLD: 3.98 M/UL — LOW (ref 4.2–5.4)
RBC # FLD: 18.2 % — HIGH (ref 11.5–14.5)
SODIUM SERPL-SCNC: 140 MMOL/L — SIGNIFICANT CHANGE UP (ref 135–146)
WBC # BLD: 16.53 K/UL — HIGH (ref 4.8–10.8)
WBC # FLD AUTO: 16.53 K/UL — HIGH (ref 4.8–10.8)

## 2024-02-11 PROCEDURE — 99232 SBSQ HOSP IP/OBS MODERATE 35: CPT

## 2024-02-11 RX ORDER — WARFARIN SODIUM 2.5 MG/1
1 TABLET ORAL ONCE
Refills: 0 | Status: COMPLETED | OUTPATIENT
Start: 2024-02-11 | End: 2024-02-11

## 2024-02-11 RX ORDER — POTASSIUM CHLORIDE 20 MEQ
40 PACKET (EA) ORAL ONCE
Refills: 0 | Status: COMPLETED | OUTPATIENT
Start: 2024-02-11 | End: 2024-02-11

## 2024-02-11 RX ORDER — MAGNESIUM SULFATE 500 MG/ML
2 VIAL (ML) INJECTION
Refills: 0 | Status: COMPLETED | OUTPATIENT
Start: 2024-02-11 | End: 2024-02-11

## 2024-02-11 RX ADMIN — Medication 40 MILLIGRAM(S): at 05:51

## 2024-02-11 RX ADMIN — Medication 25 GRAM(S): at 12:56

## 2024-02-11 RX ADMIN — Medication 100 MILLIGRAM(S): at 05:51

## 2024-02-11 RX ADMIN — SPIRONOLACTONE 25 MILLIGRAM(S): 25 TABLET, FILM COATED ORAL at 05:52

## 2024-02-11 RX ADMIN — SODIUM CHLORIDE 3 MILLILITER(S): 9 INJECTION INTRAMUSCULAR; INTRAVENOUS; SUBCUTANEOUS at 13:34

## 2024-02-11 RX ADMIN — ALBUTEROL 2.5 MILLIGRAM(S): 90 AEROSOL, METERED ORAL at 13:34

## 2024-02-11 RX ADMIN — ALBUTEROL 2.5 MILLIGRAM(S): 90 AEROSOL, METERED ORAL at 07:55

## 2024-02-11 RX ADMIN — AMLODIPINE BESYLATE 5 MILLIGRAM(S): 2.5 TABLET ORAL at 05:51

## 2024-02-11 RX ADMIN — Medication 50 MILLIGRAM(S): at 05:52

## 2024-02-11 RX ADMIN — Medication 40 MILLIEQUIVALENT(S): at 12:57

## 2024-02-11 RX ADMIN — CEFTRIAXONE 100 MILLIGRAM(S): 500 INJECTION, POWDER, FOR SOLUTION INTRAMUSCULAR; INTRAVENOUS at 12:56

## 2024-02-11 RX ADMIN — WARFARIN SODIUM 1 MILLIGRAM(S): 2.5 TABLET ORAL at 21:51

## 2024-02-11 RX ADMIN — Medication 100 MILLIGRAM(S): at 18:51

## 2024-02-11 RX ADMIN — Medication 25 GRAM(S): at 15:41

## 2024-02-11 RX ADMIN — CHLORHEXIDINE GLUCONATE 1 APPLICATION(S): 213 SOLUTION TOPICAL at 12:57

## 2024-02-11 RX ADMIN — KETOTIFEN FUMARATE 1 DROP(S): 0.34 SOLUTION OPHTHALMIC at 12:57

## 2024-02-11 RX ADMIN — Medication 25 GRAM(S): at 14:10

## 2024-02-11 RX ADMIN — ATORVASTATIN CALCIUM 40 MILLIGRAM(S): 80 TABLET, FILM COATED ORAL at 21:51

## 2024-02-11 RX ADMIN — SODIUM CHLORIDE 3 MILLILITER(S): 9 INJECTION INTRAMUSCULAR; INTRAVENOUS; SUBCUTANEOUS at 07:54

## 2024-02-11 RX ADMIN — Medication 25 MICROGRAM(S): at 05:51

## 2024-02-11 NOTE — PROGRESS NOTE ADULT - SUBJECTIVE AND OBJECTIVE BOX
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  The patient reports that she accidentally rolled off the bed this morning around 2:30 AM, but denies LOC.  Nursing home staff found patient having hypoxia, so patient was brought to the ED for evaluation. She reports that she has been having a new cough for the past few days with sputum production along with runny nose, congestion and SOB. Patient also reports chills but denies fever.  Patient denies headache, lightheadedness, dizziness, chest pain, fever, nausea, vomiting, abdominal pain, urinary symptoms, and change with bowel movement.  Patient denies any pain or discomfort or injury elsewhere.  In the ED, BP was 153/67, HR 75, and had a fever of 102.4 along with Spo2 with 95% on 3L NC. Labs showed WBC 19 with left shift, normocytic anemia, K 3.3, UA negative, RSV positive, with CT chest showing Right consolidation and nodularity. CT cervical spine showed no fractures despite the limitation of study. EKG showed Afib with Vpaced rhythm.  (07 Feb 2024 18:54)      Interval/Overnight Events      ROS  General: Denies fevers, chills, nightsweats, weight loss  HEENT: Denies headache, rhinorrhea, sore throat, eye pain  CV: Denies CP, palpitations  PULM: Denies SOB, cough  GI: Denies abdominal pain, diarrhea  : Denies dysuria, hematuria  MSK: Denies arthralgias  SKIN: Denies rash   NEURO: Denies paresthesias, weakness  PSYCH: Denies depression    MEDICATIONS  (STANDING):  albuterol    0.083% 2.5 milliGRAM(s) Nebulizer every 6 hours  amLODIPine   Tablet 5 milliGRAM(s) Oral daily  atorvastatin 40 milliGRAM(s) Oral at bedtime  cefTRIAXone   IVPB 2000 milliGRAM(s) IV Intermittent every 24 hours  chlorhexidine 2% Cloths 1 Application(s) Topical daily  doxycycline IVPB 100 milliGRAM(s) IV Intermittent every 12 hours  furosemide    Tablet 40 milliGRAM(s) Oral daily  ketotifen 0.025% Ophthalmic Solution 1 Drop(s) Both EYES daily  levothyroxine 25 MICROGram(s) Oral daily  metoprolol succinate ER 50 milliGRAM(s) Oral daily  polyethylene glycol 3350 17 Gram(s) Oral daily  predniSONE   Tablet 40 milliGRAM(s) Oral daily  sodium chloride 0.9% for Nebulization 3 milliLiter(s) Nebulizer every 6 hours  spironolactone 25 milliGRAM(s) Oral daily  warfarin 1 milliGRAM(s) Oral once    MEDICATIONS  (PRN):  acetaminophen     Tablet .. 650 milliGRAM(s) Oral every 6 hours PRN Temp greater or equal to 38C (100.4F), Mild Pain (1 - 3)  melatonin 3 milliGRAM(s) Oral at bedtime PRN Insomnia  silver sulfADIAZINE 1% Cream 1 Application(s) Topical two times a day PRN Wound Care    ANTIBIOTICS:  cefTRIAXone   IVPB 2000 milliGRAM(s) IV Intermittent every 24 hours  doxycycline IVPB 100 milliGRAM(s) IV Intermittent every 12 hours      VITALS:  T(F): 96, Max: 98 (02-10-24 @ 20:13)  HR: 60  BP: 114/56  RR: 18Vital Signs Last 24 Hrs  T(C): 35.6 (11 Feb 2024 12:00), Max: 36.7 (10 Feb 2024 20:13)  T(F): 96 (11 Feb 2024 12:00), Max: 98 (10 Feb 2024 20:13)  HR: 60 (11 Feb 2024 12:00) (60 - 62)  BP: 114/56 (11 Feb 2024 12:00) (114/56 - 163/70)  BP(mean): --  RR: 18 (11 Feb 2024 12:00) (18 - 20)  SpO2: 97% (11 Feb 2024 12:00) (97% - 98%)    Parameters below as of 11 Feb 2024 12:00  Patient On (Oxygen Delivery Method): nasal cannula  O2 Flow (L/min): 2   I&O's Summary    10 Feb 2024 07:01  -  11 Feb 2024 07:00  --------------------------------------------------------  IN: 1000 mL / OUT: 2050 mL / NET: -1050 mL    11 Feb 2024 07:01  -  11 Feb 2024 17:53  --------------------------------------------------------  IN: 420 mL / OUT: 1200 mL / NET: -780 mL      PHYSICAL EXAM:  Gen: NAD, resting in bed  HEENT: Normocephalic, atraumatic  Neck: supple, no lymphadenopathy  CV: Regular rate & regular rhythm  Lungs: CTABL no wheeze  Abdomen: Soft, NTND+ BS present  Ext: Warm, well perfused no CCE  Neuro: non focal, awake, CN II-XII intact   Skin: no rash, no erythema  Psych: no SI, HI, Hallucination     LABS:                        10.5   16.53 )-----------( 383      ( 11 Feb 2024 08:52 )             34.4     02-11    140  |  94<L>  |  16  ----------------------------<  122<H>  3.1<L>   |  29  |  0.6<L>    Ca    9.0      11 Feb 2024 08:52  Mg     1.4     02-11    TPro  7.3  /  Alb  3.6  /  TBili  0.7  /  DBili  x   /  AST  48<H>  /  ALT  44<H>  /  AlkPhos  153<H>  02-11      LIVER FUNCTIONS - ( 11 Feb 2024 08:52 )  Alb: 3.6 g/dL / Pro: 7.3 g/dL / ALK PHOS: 153 U/L / ALT: 44 U/L / AST: 48 U/L / GGT: x           PT/INR - ( 11 Feb 2024 08:52 )   PT: 39.30 sec;   INR: 3.40 ratio         PTT - ( 10 Feb 2024 07:49 )  PTT:38.2 sec    MICROBIOLOGY:  Urinalysis Basic - ( 11 Feb 2024 08:52 )    Color: x / Appearance: x / SG: x / pH: x  Gluc: 122 mg/dL / Ketone: x  / Bili: x / Urobili: x   Blood: x / Protein: x / Nitrite: x   Leuk Esterase: x / RBC: x / WBC x   Sq Epi: x / Non Sq Epi: x / Bacteria: x        Culture - Urine (collected 02-07-24 @ 14:03)  Source: Clean Catch Clean Catch (Midstream)  Final Report (02-09-24 @ 09:22):    >=3 organisms. Probable collection contamination.    Culture - Blood (collected 02-07-24 @ 13:56)  Source: .Blood Blood-Peripheral  Preliminary Report (02-10-24 @ 22:01):    No growth at 72 Hours    Culture - Blood (collected 02-07-24 @ 13:56)  Source: .Blood Blood-Peripheral  Preliminary Report (02-10-24 @ 22:01):    No growth at 72 Hours        Blood Gas Venous - Lactate: 2.0 mmol/L (02-07-24 @ 15:23)    Legionella Antigen, Urine: Negative (02-09-24 @ 17:07)  MRSA PCR Result.: Negative (02-09-24 @ 16:57)      IMAGING:  CXR      CT    CARDIOLOGY TESTING  QRS axis to [] ° and NSR at a rate of [] BPM. There was no atrial enlargement. There was no ventricular hypertrophy. There were no ST-T changes and all intervals were normal.    12 Lead ECG:   Ventricular Rate 60 BPM    Atrial Rate 208 BPM    QRS Duration 142 ms    Q-T Interval 532 ms    QTC Calculation(Bazett) 532 ms    R Axis -83 degrees    T Axis 87 degrees    Diagnosis Line Ventricular-paced rhythm  Abnormal ECG    Confirmed by SMITA HILL MD ([id number]) on 2/9/2024 12:58:57 PM (02-09-24 @ 10:52)  12 Lead ECG:   Ventricular Rate 75 BPM    Atrial Rate 75 BPM    QRS Duration 144 ms    Q-T Interval 478 ms    QTC Calculation(Bazett) 533 ms    R Axis 227 degrees    T Axis 24 degrees    Diagnosis Line Ventricular-paced rhythm  Probable Atrial fibrillation  Abnormal ECG    Confirmed by ARIES ISLAS MD ([id number]) on 2/7/2024 2:57:52 PM (02-07-24 @ 14:09)      ASSESSMENT/PLAN:    #Supportive Management:  82 yo F with hx of CAD s/p CABG, Paroxysmal A.fib complicated CHB s/p PPM (on coumadin), Rheumatic MV disease with 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     #Sepsis and Acute hypoxic respiratory failure 2/2 RSV with superimposed PNA  - CTA chest: Areas of consolidation within the right middle lobe and lower lobes. Small right pleural effusion with adjacent compressive atelectasis. Areas of nodularity within the right lung not well-seen on prior study may be inflammatory in nature.   - s/p Azithro, Levaquin and Ceftriaxone x 1 given in ED.   - on ceftriaxone and levofloxacin   - f/u BCx, Procalcitonin, MRSA nares, Atypical PNA panel, sputum Cx   - Blood and urine cultures negative 2/7  - speech and swallow eval--- thin liquid; pureed; regular solid  - ID consult--- -Urine for strep pneumonia antigen, Nares ORSA. Sputum gm stain, cultures. BCx  - Rocephin 2 gm iv q24h  - Levaquin 500 mg iv q24h.. DC levofloxacin due to Long QT. switch to doxycyline  .       #Paroxysmal A.fib - on coumadin.   - Monitor INR daily and dose coumadin accordingly.   - As per pt her target is 2.5- 3.5 INR     #Wheezing and SOB  - 2/8 night complained SOB  - Started on iv solumedrol  - oe slight wheezes, on suppl o2 NC 2L  - SWITCH TO PO STEROIDS 2/9    #Long QT  - EKG shows QT of 532   - Due to Levofloxacin  - stopped Levofloxacin 2/9. switched to Doxycycline     #Pulmonary nodule seen on CT scan chest  - 6 mm nodule is seen within the right middle lobe On series 2,  a new area of nodularity within the right apex measuring up to 7 mm. Another irregular area of nodularity seen within the right apex 9 measuring up to 7 mm  - fu outpt         #CAD s/p CABG   - c/w home med.     #Rheumatic heart disease   - f/u outpatient.     #HTN /HLD   - c/w home med.     #Hypothyroidism   - c/w home med. Dispo:   DVT Ppx: GI Ppx: Diet:  Diet, DASH/TLC:   Sodium & Cholesterol Restricted (02-07-24 @ 18:54) [Active]      Total time spent to complete patient's bedside assessment, review medical chart, discuss medical plan of care with covering medical team was more than 45 minutes with >50% of time spent face to face with patient, discussion with patient/family and/or coordination of care    Housestaff's notes reviewed. When there is confusion regarding the content or information provided in the notes of a housestaff (resident, medical student, physician assistant, or nurse practioner) and the attending physician notes, the attending physician's note takes precedence and supersedes the other notes.    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     Interval/Overnight Events  - No acute complaints  - feel better  - require O2 on ambulation  - dispo pending Home O2 delivery    ROS  General: Denies fevers, chills, nightsweats, weight loss  HEENT: Denies headache, rhinorrhea, sore throat, eye pain  CV: Denies CP, palpitations  PULM: Denies SOB, cough  GI: Denies abdominal pain, diarrhea  : Denies dysuria, hematuria  MSK: Denies arthralgias  SKIN: Denies rash   NEURO: Denies paresthesias, weakness  PSYCH: Denies depression    MEDICATIONS  (STANDING):  albuterol    0.083% 2.5 milliGRAM(s) Nebulizer every 6 hours  amLODIPine   Tablet 5 milliGRAM(s) Oral daily  atorvastatin 40 milliGRAM(s) Oral at bedtime  cefTRIAXone   IVPB 2000 milliGRAM(s) IV Intermittent every 24 hours  chlorhexidine 2% Cloths 1 Application(s) Topical daily  doxycycline IVPB 100 milliGRAM(s) IV Intermittent every 12 hours  furosemide    Tablet 40 milliGRAM(s) Oral daily  ketotifen 0.025% Ophthalmic Solution 1 Drop(s) Both EYES daily  levothyroxine 25 MICROGram(s) Oral daily  metoprolol succinate ER 50 milliGRAM(s) Oral daily  polyethylene glycol 3350 17 Gram(s) Oral daily  predniSONE   Tablet 40 milliGRAM(s) Oral daily  sodium chloride 0.9% for Nebulization 3 milliLiter(s) Nebulizer every 6 hours  spironolactone 25 milliGRAM(s) Oral daily  warfarin 1 milliGRAM(s) Oral once    MEDICATIONS  (PRN):  acetaminophen     Tablet .. 650 milliGRAM(s) Oral every 6 hours PRN Temp greater or equal to 38C (100.4F), Mild Pain (1 - 3)  melatonin 3 milliGRAM(s) Oral at bedtime PRN Insomnia  silver sulfADIAZINE 1% Cream 1 Application(s) Topical two times a day PRN Wound Care    ANTIBIOTICS:  cefTRIAXone   IVPB 2000 milliGRAM(s) IV Intermittent every 24 hours  doxycycline IVPB 100 milliGRAM(s) IV Intermittent every 12 hours      VITALS:  T(F): 96, Max: 98 (02-10-24 @ 20:13)  HR: 60  BP: 114/56  RR: 18Vital Signs Last 24 Hrs  T(C): 35.6 (11 Feb 2024 12:00), Max: 36.7 (10 Feb 2024 20:13)  T(F): 96 (11 Feb 2024 12:00), Max: 98 (10 Feb 2024 20:13)  HR: 60 (11 Feb 2024 12:00) (60 - 62)  BP: 114/56 (11 Feb 2024 12:00) (114/56 - 163/70)  BP(mean): --  RR: 18 (11 Feb 2024 12:00) (18 - 20)  SpO2: 97% (11 Feb 2024 12:00) (97% - 98%)    Parameters below as of 11 Feb 2024 12:00  Patient On (Oxygen Delivery Method): nasal cannula  O2 Flow (L/min): 2   I&O's Summary    10 Feb 2024 07:01  -  11 Feb 2024 07:00  --------------------------------------------------------  IN: 1000 mL / OUT: 2050 mL / NET: -1050 mL    11 Feb 2024 07:01  -  11 Feb 2024 17:53  --------------------------------------------------------  IN: 420 mL / OUT: 1200 mL / NET: -780 mL      PHYSICAL EXAM:  Gen: NAD, resting in bed  HEENT: Normocephalic, atraumatic  Neck: supple, no lymphadenopathy  CV: Regular rate & regular rhythm  Lungs: CTABL no wheeze  Abdomen: Soft, NTND+ BS present  Ext: Warm, well perfused no CCE  Neuro: non focal, awake, CN II-XII intact   Skin: no rash, no erythema  Psych: no SI, HI, Hallucination     LABS:                        10.5   16.53 )-----------( 383      ( 11 Feb 2024 08:52 )             34.4     02-11    140  |  94<L>  |  16  ----------------------------<  122<H>  3.1<L>   |  29  |  0.6<L>    Ca    9.0      11 Feb 2024 08:52  Mg     1.4     02-11    TPro  7.3  /  Alb  3.6  /  TBili  0.7  /  DBili  x   /  AST  48<H>  /  ALT  44<H>  /  AlkPhos  153<H>  02-11      LIVER FUNCTIONS - ( 11 Feb 2024 08:52 )  Alb: 3.6 g/dL / Pro: 7.3 g/dL / ALK PHOS: 153 U/L / ALT: 44 U/L / AST: 48 U/L / GGT: x           PT/INR - ( 11 Feb 2024 08:52 )   PT: 39.30 sec;   INR: 3.40 ratio         PTT - ( 10 Feb 2024 07:49 )  PTT:38.2 sec    MICROBIOLOGY:  Urinalysis Basic - ( 11 Feb 2024 08:52 )    Color: x / Appearance: x / SG: x / pH: x  Gluc: 122 mg/dL / Ketone: x  / Bili: x / Urobili: x   Blood: x / Protein: x / Nitrite: x   Leuk Esterase: x / RBC: x / WBC x   Sq Epi: x / Non Sq Epi: x / Bacteria: x        Culture - Urine (collected 02-07-24 @ 14:03)  Source: Clean Catch Clean Catch (Midstream)  Final Report (02-09-24 @ 09:22):    >=3 organisms. Probable collection contamination.    Culture - Blood (collected 02-07-24 @ 13:56)  Source: .Blood Blood-Peripheral  Preliminary Report (02-10-24 @ 22:01):    No growth at 72 Hours    Culture - Blood (collected 02-07-24 @ 13:56)  Source: .Blood Blood-Peripheral  Preliminary Report (02-10-24 @ 22:01):    No growth at 72 Hours        Blood Gas Venous - Lactate: 2.0 mmol/L (02-07-24 @ 15:23)    Legionella Antigen, Urine: Negative (02-09-24 @ 17:07)  MRSA PCR Result.: Negative (02-09-24 @ 16:57)      IMAGING:  CXR      CT    CARDIOLOGY TESTING  12 Lead ECG:   Ventricular Rate 60 BPM  Atrial Rate 208 BPM    QRS Duration 142 ms    Q-T Interval 532 ms    QTC Calculation(Bazett) 532 ms    R Axis -83 degrees    T Axis 87 degrees    Diagnosis Line Ventricular-paced rhythm  Abnormal ECG    Confirmed by SMITA HILL MD ([id number]) on 2/9/2024 12:58:57 PM (02-09-24 @ 10:52)  12 Lead ECG:   Ventricular Rate 75 BPM    Atrial Rate 75 BPM    QRS Duration 144 ms    Q-T Interval 478 ms    QTC Calculation(Bazett) 533 ms    R Axis 227 degrees    T Axis 24 degrees    Diagnosis Line Ventricular-paced rhythm  Probable Atrial fibrillation  Abnormal ECG    Confirmed by ARIES ISLAS MD ([id number]) on 2/7/2024 2:57:52 PM (02-07-24 @ 14:09)      ASSESSMENT/PLAN:    #Supportive Management:  84 yo F with hx of CAD s/p CABG, Paroxysmal A.fib complicated CHB s/p PPM (on coumadin), Rheumatic MV disease with 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     #Sepsis and Acute hypoxic respiratory failure 2/2 RSV with superimposed PNA  - CTA chest: Areas of consolidation within the right middle lobe and lower lobes. Small right pleural effusion with adjacent compressive atelectasis. Areas of nodularity within the right lung not well-seen on prior study may be inflammatory in nature.   - s/p Azithro, Levaquin and Ceftriaxone x 1 given in ED.   - on ceftriaxone and levofloxacin   - f/u BCx, Procalcitonin, MRSA nares, Atypical PNA panel, sputum Cx   - Blood and urine cultures negative 2/7  - speech and swallow eval--- thin liquid; pureed; regular solid  - ID consult--- -Urine for strep pneumonia antigen, Nares ORSA. Sputum gm stain, cultures. BCx  - Rocephin 2 gm iv q24h  - Levaquin 500 mg iv q24h.. DC levofloxacin due to Long QT. switch to doxycyline  .     #Paroxysmal A.fib - on coumadin.   - Monitor INR daily and dose coumadin accordingly.   - As per pt her target is 2.5- 3.5 INR     #Wheezing and SOB  - 2/8 night complained SOB  - Started on iv solumedrol  - oe slight wheezes, on suppl o2 NC 2L  - SWITCH TO PO STEROIDS 2/9    #Long QT  - EKG shows QT of 532   - Due to Levofloxacin  - stopped Levofloxacin 2/9. switched to Doxycycline     #Pulmonary nodule seen on CT scan chest  - 6 mm nodule is seen within the right middle lobe On series 2,  a new area of nodularity within the right apex measuring up to 7 mm. Another irregular area of nodularity seen within the right apex 9 measuring up to 7 mm  - fu outpt         #CAD s/p CABG   - c/w home med.     #Rheumatic heart disease   - f/u outpatient.     #HTN /HLD   - c/w home med.     #Hypothyroidism   - c/w home med. Dispo:   DVT Ppx: GI Ppx: Diet:  Diet, DASH/TLC:   Sodium & Cholesterol Restricted (02-07-24 @ 18:54) [Active]      Total time spent to complete patient's bedside assessment, review medical chart, discuss medical plan of care with covering medical team was more than 45 minutes with >50% of time spent face to face with patient, discussion with patient/family and/or coordination of care    Housestaff's notes reviewed. When there is confusion regarding the content or information provided in the notes of a housestaff (resident, medical student, physician assistant, or nurse practioner) and the attending physician notes, the attending physician's note takes precedence and supersedes the other notes.    Tyron Saleem MD/Aaliyah  Attending Physician

## 2024-02-12 LAB
ALBUMIN SERPL ELPH-MCNC: 3.2 G/DL — LOW (ref 3.5–5.2)
ALP SERPL-CCNC: 130 U/L — HIGH (ref 30–115)
ALT FLD-CCNC: 51 U/L — HIGH (ref 0–41)
ANION GAP SERPL CALC-SCNC: 14 MMOL/L — SIGNIFICANT CHANGE UP (ref 7–14)
AST SERPL-CCNC: 48 U/L — HIGH (ref 0–41)
BASOPHILS # BLD AUTO: 0.03 K/UL — SIGNIFICANT CHANGE UP (ref 0–0.2)
BASOPHILS NFR BLD AUTO: 0.2 % — SIGNIFICANT CHANGE UP (ref 0–1)
BILIRUB SERPL-MCNC: 0.5 MG/DL — SIGNIFICANT CHANGE UP (ref 0.2–1.2)
BUN SERPL-MCNC: 19 MG/DL — SIGNIFICANT CHANGE UP (ref 10–20)
CALCIUM SERPL-MCNC: 8.6 MG/DL — SIGNIFICANT CHANGE UP (ref 8.4–10.5)
CHLORIDE SERPL-SCNC: 94 MMOL/L — LOW (ref 98–110)
CO2 SERPL-SCNC: 28 MMOL/L — SIGNIFICANT CHANGE UP (ref 17–32)
CREAT SERPL-MCNC: 0.6 MG/DL — LOW (ref 0.7–1.5)
CULTURE RESULTS: SIGNIFICANT CHANGE UP
CULTURE RESULTS: SIGNIFICANT CHANGE UP
EGFR: 89 ML/MIN/1.73M2 — SIGNIFICANT CHANGE UP
EOSINOPHIL # BLD AUTO: 0.05 K/UL — SIGNIFICANT CHANGE UP (ref 0–0.7)
EOSINOPHIL NFR BLD AUTO: 0.4 % — SIGNIFICANT CHANGE UP (ref 0–8)
GLUCOSE SERPL-MCNC: 120 MG/DL — HIGH (ref 70–99)
HCT VFR BLD CALC: 32.1 % — LOW (ref 37–47)
HGB BLD-MCNC: 9.9 G/DL — LOW (ref 12–16)
IMM GRANULOCYTES NFR BLD AUTO: 0.9 % — HIGH (ref 0.1–0.3)
INR BLD: 3.53 RATIO — HIGH (ref 0.65–1.3)
LYMPHOCYTES # BLD AUTO: 15.9 % — LOW (ref 20.5–51.1)
LYMPHOCYTES # BLD AUTO: 2.05 K/UL — SIGNIFICANT CHANGE UP (ref 1.2–3.4)
MAGNESIUM SERPL-MCNC: 2 MG/DL — SIGNIFICANT CHANGE UP (ref 1.8–2.4)
MCHC RBC-ENTMCNC: 26.2 PG — LOW (ref 27–31)
MCHC RBC-ENTMCNC: 30.8 G/DL — LOW (ref 32–37)
MCV RBC AUTO: 84.9 FL — SIGNIFICANT CHANGE UP (ref 81–99)
MONOCYTES # BLD AUTO: 1 K/UL — HIGH (ref 0.1–0.6)
MONOCYTES NFR BLD AUTO: 7.7 % — SIGNIFICANT CHANGE UP (ref 1.7–9.3)
NEUTROPHILS # BLD AUTO: 9.68 K/UL — HIGH (ref 1.4–6.5)
NEUTROPHILS NFR BLD AUTO: 74.9 % — SIGNIFICANT CHANGE UP (ref 42.2–75.2)
NRBC # BLD: 0 /100 WBCS — SIGNIFICANT CHANGE UP (ref 0–0)
PLATELET # BLD AUTO: 275 K/UL — SIGNIFICANT CHANGE UP (ref 130–400)
PMV BLD: 10.2 FL — SIGNIFICANT CHANGE UP (ref 7.4–10.4)
POTASSIUM SERPL-MCNC: 3.5 MMOL/L — SIGNIFICANT CHANGE UP (ref 3.5–5)
POTASSIUM SERPL-SCNC: 3.5 MMOL/L — SIGNIFICANT CHANGE UP (ref 3.5–5)
PROT SERPL-MCNC: 6.3 G/DL — SIGNIFICANT CHANGE UP (ref 6–8)
PROTHROM AB SERPL-ACNC: >40 SEC — HIGH (ref 9.95–12.87)
RBC # BLD: 3.78 M/UL — LOW (ref 4.2–5.4)
RBC # FLD: 17.9 % — HIGH (ref 11.5–14.5)
SODIUM SERPL-SCNC: 136 MMOL/L — SIGNIFICANT CHANGE UP (ref 135–146)
SPECIMEN SOURCE: SIGNIFICANT CHANGE UP
SPECIMEN SOURCE: SIGNIFICANT CHANGE UP
WBC # BLD: 12.93 K/UL — HIGH (ref 4.8–10.8)
WBC # FLD AUTO: 12.93 K/UL — HIGH (ref 4.8–10.8)

## 2024-02-12 PROCEDURE — 99232 SBSQ HOSP IP/OBS MODERATE 35: CPT

## 2024-02-12 RX ADMIN — ALBUTEROL 2.5 MILLIGRAM(S): 90 AEROSOL, METERED ORAL at 13:15

## 2024-02-12 RX ADMIN — Medication 40 MILLIGRAM(S): at 05:43

## 2024-02-12 RX ADMIN — ATORVASTATIN CALCIUM 40 MILLIGRAM(S): 80 TABLET, FILM COATED ORAL at 21:05

## 2024-02-12 RX ADMIN — Medication 100 MILLIGRAM(S): at 17:32

## 2024-02-12 RX ADMIN — Medication 100 MILLIGRAM(S): at 05:43

## 2024-02-12 RX ADMIN — Medication 25 MICROGRAM(S): at 05:43

## 2024-02-12 RX ADMIN — AMLODIPINE BESYLATE 5 MILLIGRAM(S): 2.5 TABLET ORAL at 05:44

## 2024-02-12 RX ADMIN — ALBUTEROL 2.5 MILLIGRAM(S): 90 AEROSOL, METERED ORAL at 19:31

## 2024-02-12 RX ADMIN — CEFTRIAXONE 100 MILLIGRAM(S): 500 INJECTION, POWDER, FOR SOLUTION INTRAMUSCULAR; INTRAVENOUS at 12:14

## 2024-02-12 RX ADMIN — SPIRONOLACTONE 25 MILLIGRAM(S): 25 TABLET, FILM COATED ORAL at 05:43

## 2024-02-12 RX ADMIN — Medication 50 MILLIGRAM(S): at 05:43

## 2024-02-12 RX ADMIN — CHLORHEXIDINE GLUCONATE 1 APPLICATION(S): 213 SOLUTION TOPICAL at 12:18

## 2024-02-12 RX ADMIN — ALBUTEROL 2.5 MILLIGRAM(S): 90 AEROSOL, METERED ORAL at 08:04

## 2024-02-12 NOTE — PROGRESS NOTE ADULT - SUBJECTIVE AND OBJECTIVE BOX
SORAYA KING  83y  Female      Patient is a 83y old  Female who presents with a chief complaint of Hypoxia and Fall     INTERVAL HPI/OVERNIGHT EVENTS:      ******************************* REVIEW OF SYSTEMS:**********************************************    All other review of systems negative    *********************** VITALS ******************************************    T(F): 96.5 (02-12-24 @ 13:44)  HR: 60 (02-12-24 @ 13:44) (60 - 60)  BP: 101/51 (02-12-24 @ 13:44) (101/51 - 143/71)  RR: 16 (02-12-24 @ 13:44) (16 - 16)  SpO2: 98% (02-12-24 @ 08:18) (98% - 98%)    02-11-24 @ 07:01  -  02-12-24 @ 07:00  --------------------------------------------------------  IN: 420 mL / OUT: 1200 mL / NET: -780 mL    02-12-24 @ 07:01  -  02-12-24 @ 13:46  --------------------------------------------------------  IN: 236 mL / OUT: 950 mL / NET: -714 mL            02-11-24 @ 07:01  -  02-12-24 @ 07:00  --------------------------------------------------------  IN: 420 mL / OUT: 1200 mL / NET: -780 mL    02-12-24 @ 07:01  -  02-12-24 @ 13:46  --------------------------------------------------------  IN: 236 mL / OUT: 950 mL / NET: -714 mL        ******************************** PHYSICAL EXAM:**************************************************  GENERAL: NAD    PSYCH: no agitation, baseline mentation  HEENT:     NERVOUS SYSTEM:  Alert & Oriented X3,   PULMONARY: MADHU, CTA    CARDIOVASCULAR: S1S2 RRR    GI: Soft, NT, ND; BS present.    EXTREMITIES:  2+ Peripheral Pulses, No clubbing, cyanosis, or edema    LYMPH: No lymphadenopathy noted    SKIN: No rashes or lesions      **************************** LABS *******************************************************                          9.9    12.93 )-----------( 275      ( 12 Feb 2024 08:10 )             32.1     02-12    136  |  94<L>  |  19  ----------------------------<  120<H>  3.5   |  28  |  0.6<L>    Ca    8.6      12 Feb 2024 08:10  Mg     2.0     02-12    TPro  6.3  /  Alb  3.2<L>  /  TBili  0.5  /  DBili  x   /  AST  48<H>  /  ALT  51<H>  /  AlkPhos  130<H>  02-12      Urinalysis Basic - ( 12 Feb 2024 08:10 )    Color: x / Appearance: x / SG: x / pH: x  Gluc: 120 mg/dL / Ketone: x  / Bili: x / Urobili: x   Blood: x / Protein: x / Nitrite: x   Leuk Esterase: x / RBC: x / WBC x   Sq Epi: x / Non Sq Epi: x / Bacteria: x      PT/INR - ( 12 Feb 2024 08:10 )   PT: >40.00 sec;   INR: 3.53 ratio           Lactate Trend        CAPILLARY BLOOD GLUCOSE              **************************Active Medications *******************************************  No Known Allergies      acetaminophen     Tablet .. 650 milliGRAM(s) Oral every 6 hours PRN  albuterol    0.083% 2.5 milliGRAM(s) Nebulizer every 6 hours  amLODIPine   Tablet 5 milliGRAM(s) Oral daily  atorvastatin 40 milliGRAM(s) Oral at bedtime  cefTRIAXone   IVPB 2000 milliGRAM(s) IV Intermittent every 24 hours  chlorhexidine 2% Cloths 1 Application(s) Topical daily  doxycycline IVPB 100 milliGRAM(s) IV Intermittent every 12 hours  furosemide    Tablet 40 milliGRAM(s) Oral daily  ketotifen 0.025% Ophthalmic Solution 1 Drop(s) Both EYES daily  levothyroxine 25 MICROGram(s) Oral daily  melatonin 3 milliGRAM(s) Oral at bedtime PRN  metoprolol succinate ER 50 milliGRAM(s) Oral daily  polyethylene glycol 3350 17 Gram(s) Oral daily  predniSONE   Tablet 40 milliGRAM(s) Oral daily  silver sulfADIAZINE 1% Cream 1 Application(s) Topical two times a day PRN  sodium chloride 0.9% for Nebulization 3 milliLiter(s) Nebulizer every 6 hours  spironolactone 25 milliGRAM(s) Oral daily      ***************************************************  RADIOLOGY & ADDITIONAL TESTS:    Imaging Personally Reviewed:  [ ] YES  [ ] NO    HEALTH ISSUES - PROBLEM Dx:  Suspected pulmonary embolism    Suspected deep vein thrombosis (DVT)

## 2024-02-12 NOTE — PROGRESS NOTE ADULT - SUBJECTIVE AND OBJECTIVE BOX
24H events:    Patient is a 83y old Female who presents with a chief complaint of Hypoxia and Fall (11 Feb 2024 10:52)    Primary diagnosis of PNA (pneumonia)  Today is hospital day 5d. This morning patient was seen and examined at bedside, resting comfortably in bed.    No acute or major events overnight.    Code Status:    Family communication:  Contact date:  Name of person contacted:  Relationship to patient:  Communication details:  What matters most:    PAST MEDICAL & SURGICAL HISTORY  HTN (hypertension)    Afib    Hypothyroid    FHx: spinal stenosis      SOCIAL HISTORY:  Social History:      ALLERGIES:  No Known Allergies    MEDICATIONS:  STANDING MEDICATIONS  albuterol    0.083% 2.5 milliGRAM(s) Nebulizer every 6 hours  amLODIPine   Tablet 5 milliGRAM(s) Oral daily  atorvastatin 40 milliGRAM(s) Oral at bedtime  cefTRIAXone   IVPB 2000 milliGRAM(s) IV Intermittent every 24 hours  chlorhexidine 2% Cloths 1 Application(s) Topical daily  doxycycline IVPB 100 milliGRAM(s) IV Intermittent every 12 hours  furosemide    Tablet 40 milliGRAM(s) Oral daily  ketotifen 0.025% Ophthalmic Solution 1 Drop(s) Both EYES daily  levothyroxine 25 MICROGram(s) Oral daily  metoprolol succinate ER 50 milliGRAM(s) Oral daily  polyethylene glycol 3350 17 Gram(s) Oral daily  predniSONE   Tablet 40 milliGRAM(s) Oral daily  sodium chloride 0.9% for Nebulization 3 milliLiter(s) Nebulizer every 6 hours  spironolactone 25 milliGRAM(s) Oral daily    PRN MEDICATIONS  acetaminophen     Tablet .. 650 milliGRAM(s) Oral every 6 hours PRN  melatonin 3 milliGRAM(s) Oral at bedtime PRN  silver sulfADIAZINE 1% Cream 1 Application(s) Topical two times a day PRN    VITALS:   T(F): 96.4  HR: 60  BP: 143/71  RR: 18  SpO2: 98%    PHYSICAL EXAM:    Gen: NAD, resting in bed  HEENT: Normocephalic, atraumatic  Neck: supple, no lymphadenopathy  CV: Regular rate & regular rhythm  Lungs: CTABL no wheeze  Abdomen: Soft, NTND+ BS present  Ext: Warm, well perfused no CCE  Neuro: non focal, awake, CN II-XII intact   Skin: no rash, no erythema  Psych: no SI, HI, Hallucination         (  ) Indwelling Nair Catheter:   Date insterted:    Reason (  ) Critical illness     (  ) urinary retention    (  ) Accurate Ins/Outs Monitoring     (  ) CMO patient    (  ) Central Line:   Date inserted:  Location: (  ) Right IJ     (  ) Left IJ     (  ) Right Fem     (  ) Left Fem    (  ) SPC        (  ) pigtail       (  ) PEG tube       (  ) colostomy       (  ) jejunostomy  (  ) U-Dall    LABS:                        9.9    12.93 )-----------( 275      ( 12 Feb 2024 08:10 )             32.1     02-12    136  |  94<L>  |  19  ----------------------------<  120<H>  3.5   |  28  |  0.6<L>    Ca    8.6      12 Feb 2024 08:10  Mg     2.0     02-12    TPro  6.3  /  Alb  3.2<L>  /  TBili  0.5  /  DBili  x   /  AST  48<H>  /  ALT  51<H>  /  AlkPhos  130<H>  02-12    PT/INR - ( 12 Feb 2024 08:10 )   PT: >40.00 sec;   INR: 3.53 ratio           Urinalysis Basic - ( 12 Feb 2024 08:10 )    Color: x / Appearance: x / SG: x / pH: x  Gluc: 120 mg/dL / Ketone: x  / Bili: x / Urobili: x   Blood: x / Protein: x / Nitrite: x   Leuk Esterase: x / RBC: x / WBC x   Sq Epi: x / Non Sq Epi: x / Bacteria: x                RADIOLOGY:

## 2024-02-12 NOTE — PROGRESS NOTE ADULT - ASSESSMENT
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     # Acute hypoxic respiratory failure 2/2 RSV with superimposed PNA    Sepsis POA likely due to RSV PNA   - CTA chest: Areas of consolidation within the right middle lobe and lower lobes. Small right pleural effusion with adjacent compressive atelectasis. Areas of nodularity within the right lung not well-seen on prior study may be inflammatory in nature.   - s/p Azithro, Levaquin and Ceftriaxone x 1 given in ED.   -   Procalcitonin 0.28 , MRSA nares > Neg  - Blood and urine cultures negative 2/7  - - Started on iv solumedrol ?> switched to PO Prednisone   - Rocephin 2 gm iv q24h  -  DC levofloxacin due to Long QT. switched  to doxycyline    - please try to titrate down on O2 as tolerated. .       #Paroxysmal A.fib - on coumadin.   - Monitor INR daily and dose coumadin accordingly.   - As per pt her target is 2.5- 3.5 INR     #Long QT  - EKG shows QT of 532   - Due to Levofloxacin  - stopped Levofloxacin 2/9. switched to Doxycycline     #Pulmonary nodule seen on CT scan chest  - 6 mm nodule is seen within the right middle lobe On series 2,  a new area of nodularity within the right apex measuring up to 7 mm. Another irregular area of nodularity seen within the right apex 9 measuring up to 7 mm  - fu outpt     #CAD s/p CABG   - c/w home med.     #Rheumatic heart disease   - f/u outpatient.     #HTN /HLD   - c/w home med.     #Hypothyroidism   - c/w home med.     DVT ppx: on warfarin   GI ppx: PPI   Diet: DASH diet. Speech and Swallow eval.   Activity: as tolerated.    Pending: AHRF /PT  Plan d/w the daughter   Dispo: [facility]MM[facility]

## 2024-02-12 NOTE — PROGRESS NOTE ADULT - ASSESSMENT
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     #Sepsis and Acute hypoxic respiratory failure 2/2 RSV with superimposed PNA  - CTA chest: Areas of consolidation within the right middle lobe and lower lobes. Small right pleural effusion with adjacent compressive atelectasis. Areas of nodularity within the right lung not well-seen on prior study may be inflammatory in nature.   - s/p Azithro, Levaquin and Ceftriaxone x 1 given in ED.   - on ceftriaxone and levofloxacin   - f/u BCx, Procalcitonin, MRSA nares, Atypical PNA panel, sputum Cx   - Blood and urine cultures negative 2/7  - speech and swallow eval--- thin liquid; pureed; regular solid  - ID consult--- -Urine for strep pneumonia antigen, Nares ORSA. Sputum gm stain, cultures. BCx  - Rocephin 2 gm iv q24h  - Levaquin 500 mg iv q24h.. DC levofloxacin due to Long QT. switch to doxycyline  .       #Paroxysmal A.fib - on coumadin.   - Monitor INR daily and dose coumadin accordingly.   - As per pt her target is 2.5- 3.5 INR     #Wheezing and SOB  - 2/8 night complained SOB  - Started on iv solumedrol  - oe slight wheezes, on suppl o2 NC 2L  - SWITCH TO PO STEROIDS 2/9    #Long QT  - EKG shows QT of 532   - Due to Levofloxacin  - stopped Levofloxacin 2/9. switched to Doxycycline     #Pulmonary nodule seen on CT scan chest  - 6 mm nodule is seen within the right middle lobe On series 2,  a new area of nodularity within the right apex measuring up to 7 mm. Another irregular area of nodularity seen within the right apex 9 measuring up to 7 mm  - fu outpt         #CAD s/p CABG   - c/w home med.     #Rheumatic heart disease   - f/u outpatient.     #HTN /HLD   - c/w home med.     #Hypothyroidism   - c/w home med.     DVT ppx: on warfarin   GI ppx: PPI   Diet: DASH diet. Speech and Swallow eval.   Activity: as tolerated.

## 2024-02-13 ENCOUNTER — TRANSCRIPTION ENCOUNTER (OUTPATIENT)
Age: 84
End: 2024-02-13

## 2024-02-13 LAB
ALBUMIN SERPL ELPH-MCNC: 3.8 G/DL — SIGNIFICANT CHANGE UP (ref 3.5–5.2)
ALP SERPL-CCNC: 143 U/L — HIGH (ref 30–115)
ALT FLD-CCNC: 56 U/L — HIGH (ref 0–41)
ANION GAP SERPL CALC-SCNC: 14 MMOL/L — SIGNIFICANT CHANGE UP (ref 7–14)
AST SERPL-CCNC: 46 U/L — HIGH (ref 0–41)
BASOPHILS # BLD AUTO: 0.05 K/UL — SIGNIFICANT CHANGE UP (ref 0–0.2)
BASOPHILS NFR BLD AUTO: 0.3 % — SIGNIFICANT CHANGE UP (ref 0–1)
BILIRUB SERPL-MCNC: 0.7 MG/DL — SIGNIFICANT CHANGE UP (ref 0.2–1.2)
BUN SERPL-MCNC: 21 MG/DL — HIGH (ref 10–20)
CALCIUM SERPL-MCNC: 9.5 MG/DL — SIGNIFICANT CHANGE UP (ref 8.4–10.5)
CHLORIDE SERPL-SCNC: 90 MMOL/L — LOW (ref 98–110)
CO2 SERPL-SCNC: 35 MMOL/L — HIGH (ref 17–32)
CREAT SERPL-MCNC: 0.6 MG/DL — LOW (ref 0.7–1.5)
EGFR: 89 ML/MIN/1.73M2 — SIGNIFICANT CHANGE UP
EOSINOPHIL # BLD AUTO: 0.04 K/UL — SIGNIFICANT CHANGE UP (ref 0–0.7)
EOSINOPHIL NFR BLD AUTO: 0.2 % — SIGNIFICANT CHANGE UP (ref 0–8)
GLUCOSE SERPL-MCNC: 120 MG/DL — HIGH (ref 70–99)
HCT VFR BLD CALC: 36.2 % — LOW (ref 37–47)
HGB BLD-MCNC: 11.3 G/DL — LOW (ref 12–16)
IMM GRANULOCYTES NFR BLD AUTO: 1.5 % — HIGH (ref 0.1–0.3)
INR BLD: 2.42 RATIO — HIGH (ref 0.65–1.3)
LYMPHOCYTES # BLD AUTO: 14.8 % — LOW (ref 20.5–51.1)
LYMPHOCYTES # BLD AUTO: 2.49 K/UL — SIGNIFICANT CHANGE UP (ref 1.2–3.4)
MAGNESIUM SERPL-MCNC: 1.6 MG/DL — LOW (ref 1.8–2.4)
MCHC RBC-ENTMCNC: 26.8 PG — LOW (ref 27–31)
MCHC RBC-ENTMCNC: 31.2 G/DL — LOW (ref 32–37)
MCV RBC AUTO: 86 FL — SIGNIFICANT CHANGE UP (ref 81–99)
MONOCYTES # BLD AUTO: 0.72 K/UL — HIGH (ref 0.1–0.6)
MONOCYTES NFR BLD AUTO: 4.3 % — SIGNIFICANT CHANGE UP (ref 1.7–9.3)
NEUTROPHILS # BLD AUTO: 13.25 K/UL — HIGH (ref 1.4–6.5)
NEUTROPHILS NFR BLD AUTO: 78.9 % — HIGH (ref 42.2–75.2)
NRBC # BLD: 0 /100 WBCS — SIGNIFICANT CHANGE UP (ref 0–0)
PLATELET # BLD AUTO: 416 K/UL — HIGH (ref 130–400)
PMV BLD: 9.7 FL — SIGNIFICANT CHANGE UP (ref 7.4–10.4)
POTASSIUM SERPL-MCNC: 3.5 MMOL/L — SIGNIFICANT CHANGE UP (ref 3.5–5)
POTASSIUM SERPL-SCNC: 3.5 MMOL/L — SIGNIFICANT CHANGE UP (ref 3.5–5)
PROT SERPL-MCNC: 7.5 G/DL — SIGNIFICANT CHANGE UP (ref 6–8)
PROTHROM AB SERPL-ACNC: 27.8 SEC — HIGH (ref 9.95–12.87)
RBC # BLD: 4.21 M/UL — SIGNIFICANT CHANGE UP (ref 4.2–5.4)
RBC # FLD: 18 % — HIGH (ref 11.5–14.5)
SODIUM SERPL-SCNC: 139 MMOL/L — SIGNIFICANT CHANGE UP (ref 135–146)
WBC # BLD: 16.81 K/UL — HIGH (ref 4.8–10.8)
WBC # FLD AUTO: 16.81 K/UL — HIGH (ref 4.8–10.8)

## 2024-02-13 PROCEDURE — 99232 SBSQ HOSP IP/OBS MODERATE 35: CPT

## 2024-02-13 RX ORDER — WARFARIN SODIUM 2.5 MG/1
2.5 TABLET ORAL ONCE
Refills: 0 | Status: COMPLETED | OUTPATIENT
Start: 2024-02-13 | End: 2024-02-13

## 2024-02-13 RX ADMIN — Medication 1 APPLICATION(S): at 06:02

## 2024-02-13 RX ADMIN — Medication 25 MICROGRAM(S): at 05:42

## 2024-02-13 RX ADMIN — Medication 100 MILLIGRAM(S): at 17:57

## 2024-02-13 RX ADMIN — SPIRONOLACTONE 25 MILLIGRAM(S): 25 TABLET, FILM COATED ORAL at 05:42

## 2024-02-13 RX ADMIN — Medication 40 MILLIGRAM(S): at 05:41

## 2024-02-13 RX ADMIN — Medication 40 MILLIGRAM(S): at 05:42

## 2024-02-13 RX ADMIN — Medication 50 MILLIGRAM(S): at 05:42

## 2024-02-13 RX ADMIN — ATORVASTATIN CALCIUM 40 MILLIGRAM(S): 80 TABLET, FILM COATED ORAL at 21:59

## 2024-02-13 RX ADMIN — CHLORHEXIDINE GLUCONATE 1 APPLICATION(S): 213 SOLUTION TOPICAL at 12:12

## 2024-02-13 RX ADMIN — Medication 100 MILLIGRAM(S): at 05:41

## 2024-02-13 RX ADMIN — WARFARIN SODIUM 2.5 MILLIGRAM(S): 2.5 TABLET ORAL at 21:59

## 2024-02-13 RX ADMIN — CEFTRIAXONE 100 MILLIGRAM(S): 500 INJECTION, POWDER, FOR SOLUTION INTRAMUSCULAR; INTRAVENOUS at 12:10

## 2024-02-13 RX ADMIN — Medication 1 APPLICATION(S): at 18:00

## 2024-02-13 RX ADMIN — ALBUTEROL 2.5 MILLIGRAM(S): 90 AEROSOL, METERED ORAL at 07:51

## 2024-02-13 RX ADMIN — ALBUTEROL 2.5 MILLIGRAM(S): 90 AEROSOL, METERED ORAL at 14:01

## 2024-02-13 RX ADMIN — AMLODIPINE BESYLATE 5 MILLIGRAM(S): 2.5 TABLET ORAL at 05:42

## 2024-02-13 RX ADMIN — ALBUTEROL 2.5 MILLIGRAM(S): 90 AEROSOL, METERED ORAL at 19:35

## 2024-02-13 NOTE — PROGRESS NOTE ADULT - SUBJECTIVE AND OBJECTIVE BOX
SORAYA KING  83y, Female    All available historical data reviewed    OVERNIGHT EVENTS:  no fevers  feels well and has no new complaints      ROS:  General: Denies rigors, nightsweats  HEENT: Denies headache, rhinorrhea, sore throat, eye pain  CV: Denies CP, palpitations  PULM: Denies wheezing, hemoptysis  GI: Denies hematemesis, hematochezia, melena  : Denies discharge, hematuria  MSK: Denies arthralgias, myalgias  SKIN: Denies rash, lesions  NEURO: Denies paresthesias, weakness  PSYCH: Denies depression, anxiety    VITALS:  T(F): 96.3, Max: 96.5 (02-12-24 @ 13:44)  HR: 72  BP: 146/63  RR: 16Vital Signs Last 24 Hrs  T(C): 35.7 (13 Feb 2024 04:30), Max: 35.8 (12 Feb 2024 13:44)  T(F): 96.3 (13 Feb 2024 04:30), Max: 96.5 (12 Feb 2024 13:44)  HR: 72 (13 Feb 2024 04:30) (60 - 72)  BP: 146/63 (13 Feb 2024 04:30) (101/51 - 146/63)  BP(mean): 82 (12 Feb 2024 21:03) (82 - 82)  RR: 16 (12 Feb 2024 13:44) (16 - 16)  SpO2: 94% (13 Feb 2024 10:20) (76% - 99%)    Parameters below as of 13 Feb 2024 10:20  Patient On (Oxygen Delivery Method): nasal cannula  O2 Flow (L/min): 1.5      TESTS & MEASUREMENTS:                        11.3   16.81 )-----------( 416      ( 13 Feb 2024 08:51 )             36.2     02-13    139  |  90<L>  |  21<H>  ----------------------------<  120<H>  3.5   |  35<H>  |  0.6<L>    Ca    9.5      13 Feb 2024 08:51  Mg     1.6     02-13    TPro  7.5  /  Alb  3.8  /  TBili  0.7  /  DBili  x   /  AST  46<H>  /  ALT  56<H>  /  AlkPhos  143<H>  02-13    LIVER FUNCTIONS - ( 13 Feb 2024 08:51 )  Alb: 3.8 g/dL / Pro: 7.5 g/dL / ALK PHOS: 143 U/L / ALT: 56 U/L / AST: 46 U/L / GGT: x             Culture - Urine (collected 02-07-24 @ 14:03)  Source: Clean Catch Clean Catch (Midstream)  Final Report (02-09-24 @ 09:22):    >=3 organisms. Probable collection contamination.    Culture - Blood (collected 02-07-24 @ 13:56)  Source: .Blood Blood-Peripheral  Final Report (02-12-24 @ 22:00):    No growth at 5 days    Culture - Blood (collected 02-07-24 @ 13:56)  Source: .Blood Blood-Peripheral  Final Report (02-12-24 @ 22:00):    No growth at 5 days      Urinalysis Basic - ( 13 Feb 2024 08:51 )    Color: x / Appearance: x / SG: x / pH: x  Gluc: 120 mg/dL / Ketone: x  / Bili: x / Urobili: x   Blood: x / Protein: x / Nitrite: x   Leuk Esterase: x / RBC: x / WBC x   Sq Epi: x / Non Sq Epi: x / Bacteria: x          RADIOLOGY & ADDITIONAL TESTS:  Personal review of radiological diagnostics performed  Echo and EKG results noted when applicable.     MEDICATIONS:  acetaminophen     Tablet .. 650 milliGRAM(s) Oral every 6 hours PRN  albuterol    0.083% 2.5 milliGRAM(s) Nebulizer every 6 hours  amLODIPine   Tablet 5 milliGRAM(s) Oral daily  atorvastatin 40 milliGRAM(s) Oral at bedtime  cefTRIAXone   IVPB 2000 milliGRAM(s) IV Intermittent every 24 hours  chlorhexidine 2% Cloths 1 Application(s) Topical daily  doxycycline IVPB 100 milliGRAM(s) IV Intermittent every 12 hours  furosemide    Tablet 40 milliGRAM(s) Oral daily  ketotifen 0.025% Ophthalmic Solution 1 Drop(s) Both EYES daily  levothyroxine 25 MICROGram(s) Oral daily  melatonin 3 milliGRAM(s) Oral at bedtime PRN  metoprolol succinate ER 50 milliGRAM(s) Oral daily  polyethylene glycol 3350 17 Gram(s) Oral daily  silver sulfADIAZINE 1% Cream 1 Application(s) Topical two times a day PRN  sodium chloride 0.9% for Nebulization 3 milliLiter(s) Nebulizer every 6 hours  spironolactone 25 milliGRAM(s) Oral daily      ANTIBIOTICS:  cefTRIAXone   IVPB 2000 milliGRAM(s) IV Intermittent every 24 hours  doxycycline IVPB 100 milliGRAM(s) IV Intermittent every 12 hours

## 2024-02-13 NOTE — PROGRESS NOTE ADULT - SUBJECTIVE AND OBJECTIVE BOX
SORAYA KING  83y  Female      Patient is a 83y old  Female who presents with a chief complaint of Hypoxia and Fall      INTERVAL HPI/OVERNIGHT EVENTS:      ******************************* REVIEW OF SYSTEMS:**********************************************    All other review of systems negative    *********************** VITALS ******************************************    T(F): 96.3 (02-13-24 @ 04:30)  HR: 72 (02-13-24 @ 04:30) (60 - 72)  BP: 146/63 (02-13-24 @ 04:30) (101/51 - 146/63)  RR: 16 (02-12-24 @ 13:44) (16 - 16)  SpO2: 94% (02-13-24 @ 10:20) (76% - 99%)    02-12-24 @ 07:01  -  02-13-24 @ 07:00  --------------------------------------------------------  IN: 1741 mL / OUT: 1450 mL / NET: 291 mL    02-13-24 @ 07:01  -  02-13-24 @ 13:38  --------------------------------------------------------  IN: 240 mL / OUT: 600 mL / NET: -360 mL            02-12-24 @ 07:01  -  02-13-24 @ 07:00  --------------------------------------------------------  IN: 1741 mL / OUT: 1450 mL / NET: 291 mL    02-13-24 @ 07:01  -  02-13-24 @ 13:38  --------------------------------------------------------  IN: 240 mL / OUT: 600 mL / NET: -360 mL        ******************************** PHYSICAL EXAM:**************************************************  GENERAL: NAD    PSYCH: no agitation, baseline mentation  HEENT:     NERVOUS SYSTEM:  Alert & Oriented X3,   PULMONARY: MADHU, CTA    CARDIOVASCULAR: S1S2 RRR    GI: Soft, NT, ND; BS present.    EXTREMITIES:  2+ Peripheral Pulses, No clubbing, cyanosis, or edema    LYMPH: No lymphadenopathy noted    SKIN: No rashes or lesions      **************************** LABS *******************************************************                          11.3   16.81 )-----------( 416      ( 13 Feb 2024 08:51 )             36.2     02-13    139  |  90<L>  |  21<H>  ----------------------------<  120<H>  3.5   |  35<H>  |  0.6<L>    Ca    9.5      13 Feb 2024 08:51  Mg     1.6     02-13    TPro  7.5  /  Alb  3.8  /  TBili  0.7  /  DBili  x   /  AST  46<H>  /  ALT  56<H>  /  AlkPhos  143<H>  02-13      Urinalysis Basic - ( 13 Feb 2024 08:51 )    Color: x / Appearance: x / SG: x / pH: x  Gluc: 120 mg/dL / Ketone: x  / Bili: x / Urobili: x   Blood: x / Protein: x / Nitrite: x   Leuk Esterase: x / RBC: x / WBC x   Sq Epi: x / Non Sq Epi: x / Bacteria: x      PT/INR - ( 13 Feb 2024 08:51 )   PT: 27.80 sec;   INR: 2.42 ratio           Lactate Trend        CAPILLARY BLOOD GLUCOSE              **************************Active Medications *******************************************  No Known Allergies      acetaminophen     Tablet .. 650 milliGRAM(s) Oral every 6 hours PRN  albuterol    0.083% 2.5 milliGRAM(s) Nebulizer every 6 hours  amLODIPine   Tablet 5 milliGRAM(s) Oral daily  atorvastatin 40 milliGRAM(s) Oral at bedtime  cefTRIAXone   IVPB 2000 milliGRAM(s) IV Intermittent every 24 hours  chlorhexidine 2% Cloths 1 Application(s) Topical daily  doxycycline IVPB 100 milliGRAM(s) IV Intermittent every 12 hours  furosemide    Tablet 40 milliGRAM(s) Oral daily  ketotifen 0.025% Ophthalmic Solution 1 Drop(s) Both EYES daily  levothyroxine 25 MICROGram(s) Oral daily  melatonin 3 milliGRAM(s) Oral at bedtime PRN  metoprolol succinate ER 50 milliGRAM(s) Oral daily  polyethylene glycol 3350 17 Gram(s) Oral daily  silver sulfADIAZINE 1% Cream 1 Application(s) Topical two times a day PRN  sodium chloride 0.9% for Nebulization 3 milliLiter(s) Nebulizer every 6 hours  spironolactone 25 milliGRAM(s) Oral daily      ***************************************************  RADIOLOGY & ADDITIONAL TESTS:    Imaging Personally Reviewed:  [ ] YES  [ ] NO    HEALTH ISSUES - PROBLEM Dx:  Suspected pulmonary embolism    Suspected deep vein thrombosis (DVT)

## 2024-02-13 NOTE — PROGRESS NOTE ADULT - GASTROINTESTINAL
soft/nontender/nondistended/no guarding/no rigidity/no organomegaly
soft/nontender/no guarding/no rigidity

## 2024-02-13 NOTE — PROGRESS NOTE ADULT - ASSESSMENT
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     #Sepsis and Acute hypoxic respiratory failure 2/2 RSV with superimposed PNA  - CTA chest: Areas of consolidation within the right middle lobe and lower lobes. Small right pleural effusion with adjacent compressive atelectasis. Areas of nodularity within the right lung not well-seen on prior study may be inflammatory in nature.   - s/p Azithro, Levaquin and Ceftriaxone x 1 given in ED.   - on ceftriaxone and levofloxacin   - f/u BCx, Procalcitonin, MRSA nares, Atypical PNA panel, sputum Cx   - Blood and urine cultures negative 2/7  - speech and swallow eval--- thin liquid; pureed; regular solid  - ID consult--- -Urine for strep pneumonia antigen, Nares ORSA. Sputum gm stain, cultures. BCx  - Rocephin 2 gm iv q24h  - Levaquin 500 mg iv q24h.. DC levofloxacin due to Long QT. switch to doxycyline  .   - Antibiotics end date 2/14      #Paroxysmal A.fib - on coumadin.   - Monitor INR daily and dose coumadin accordingly.   - As per pt her target is 2.5- 3.5 INR     #Wheezing and SOB  - 2/8 night complained SOB  - Started on iv solumedrol  - oe slight wheezes, on suppl o2 NC 2L  - SWITCH TO PO STEROIDS 2/9    #Long QT  - EKG shows QT of 532   - Due to Levofloxacin  - stopped Levofloxacin 2/9. switched to Doxycycline     #Pulmonary nodule seen on CT scan chest  - 6 mm nodule is seen within the right middle lobe On series 2,  a new area of nodularity within the right apex measuring up to 7 mm. Another irregular area of nodularity seen within the right apex 9 measuring up to 7 mm  - fu outpt         #CAD s/p CABG   - c/w home med.     #Rheumatic heart disease   - f/u outpatient.     #HTN /HLD   - c/w home med.     #Hypothyroidism   - c/w home med.     DVT ppx: on warfarin   GI ppx: PPI   Diet: DASH diet. Speech and Swallow eval.   Activity: as tolerated.

## 2024-02-13 NOTE — DISCHARGE NOTE NURSING/CASE MANAGEMENT/SOCIAL WORK - NSDCPEFALRISK_GEN_ALL_CORE
For information on Fall & Injury Prevention, visit: [email or web address] OR  [email or web address] OR  https://www.cdc.gov/steadi/patient.html

## 2024-02-13 NOTE — PROGRESS NOTE ADULT - SUBJECTIVE AND OBJECTIVE BOX
24H events:    Patient is a 83y old Female who presents with a chief complaint of Hypoxia and Fall (13 Feb 2024 13:38)    Primary diagnosis of PNA (pneumonia)    Today is hospital day 6d. This morning patient was seen and examined at bedside, resting comfortably in bed.    No acute or major events overnight.    Code Status:    Family communication:  Contact date:  Name of person contacted:  Relationship to patient:  Communication details:  What matters most:    PAST MEDICAL & SURGICAL HISTORY  HTN (hypertension)    Afib    Hypothyroid    FHx: spinal stenosis      SOCIAL HISTORY:  Social History:      ALLERGIES:  No Known Allergies    MEDICATIONS:  STANDING MEDICATIONS  albuterol    0.083% 2.5 milliGRAM(s) Nebulizer every 6 hours  amLODIPine   Tablet 5 milliGRAM(s) Oral daily  atorvastatin 40 milliGRAM(s) Oral at bedtime  cefTRIAXone   IVPB 2000 milliGRAM(s) IV Intermittent every 24 hours  chlorhexidine 2% Cloths 1 Application(s) Topical daily  doxycycline IVPB 100 milliGRAM(s) IV Intermittent every 12 hours  furosemide    Tablet 40 milliGRAM(s) Oral daily  ketotifen 0.025% Ophthalmic Solution 1 Drop(s) Both EYES daily  levothyroxine 25 MICROGram(s) Oral daily  metoprolol succinate ER 50 milliGRAM(s) Oral daily  polyethylene glycol 3350 17 Gram(s) Oral daily  sodium chloride 0.9% for Nebulization 3 milliLiter(s) Nebulizer every 6 hours  spironolactone 25 milliGRAM(s) Oral daily    PRN MEDICATIONS  acetaminophen     Tablet .. 650 milliGRAM(s) Oral every 6 hours PRN  melatonin 3 milliGRAM(s) Oral at bedtime PRN  silver sulfADIAZINE 1% Cream 1 Application(s) Topical two times a day PRN    VITALS:   T(F): 98.3  HR: 60  BP: 114/56  RR: 18  SpO2: 94%    PHYSICAL EXAM:    Gen: NAD, resting in bed  HEENT: Normocephalic, atraumatic  Neck: supple, no lymphadenopathy  CV: Regular rate & regular rhythm  Lungs: CTABL no wheeze  Abdomen: Soft, NTND+ BS present  Ext: Warm, well perfused no CCE  Neuro: non focal, awake, CN II-XII intact   Skin: no rash, no erythema  Psych: no SI, HI, Hallucination          Date insterted:    Reason (  ) Critical illness     (  ) urinary retention    (  ) Accurate Ins/Outs Monitoring     (  ) CMO patient    (  ) Central Line:   Date inserted:  Location: (  ) Right IJ     (  ) Left IJ     (  ) Right Fem     (  ) Left Fem    (  ) SPC        (  ) pigtail       (  ) PEG tube       (  ) colostomy       (  ) jejunostomy  (  ) U-Dall    LABS:                        11.3   16.81 )-----------( 416      ( 13 Feb 2024 08:51 )             36.2     02-13    139  |  90<L>  |  21<H>  ----------------------------<  120<H>  3.5   |  35<H>  |  0.6<L>    Ca    9.5      13 Feb 2024 08:51  Mg     1.6     02-13    TPro  7.5  /  Alb  3.8  /  TBili  0.7  /  DBili  x   /  AST  46<H>  /  ALT  56<H>  /  AlkPhos  143<H>  02-13    PT/INR - ( 13 Feb 2024 08:51 )   PT: 27.80 sec;   INR: 2.42 ratio           Urinalysis Basic - ( 13 Feb 2024 08:51 )    Color: x / Appearance: x / SG: x / pH: x  Gluc: 120 mg/dL / Ketone: x  / Bili: x / Urobili: x   Blood: x / Protein: x / Nitrite: x   Leuk Esterase: x / RBC: x / WBC x   Sq Epi: x / Non Sq Epi: x / Bacteria: x                RADIOLOGY:

## 2024-02-13 NOTE — PROGRESS NOTE ADULT - ASSESSMENT
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     # Acute hypoxic respiratory failure 2/2 RSV with superimposed PNA    Sepsis POA likely due to RSV PNA / Multilobar PNA    - CTA chest: Areas of consolidation within the right middle lobe and lower lobes. Small right pleural effusion with adjacent compressive atelectasis. Areas of nodularity within the right lung not well-seen on prior study may be inflammatory in nature.   - s/p Azithro, Levaquin and Ceftriaxone x 1 given in ED.   -   Procalcitonin 0.28 , MRSA nares > Neg  - Blood and urine cultures negative 2/7  - - Started on iv solumedrol ?> switched to PO Prednisone   - currently on Rocephin 2 gm iv q24h and IV  doxycyline  as per ID    >> f/u ID abx regimen on dc if any.    - please try to titrate down on O2 as tolerated.    Seems pt will need home O2.    #Paroxysmal A.fib - on coumadin.   - Monitor INR daily and dose coumadin accordingly.   - As per pt her target is 2.5- 3.5 INR     #Long QT  - EKG shows QT of 532   - Due to Levofloxacin  - stopped Levofloxacin 2/9. switched to Doxycycline     #Pulmonary nodule seen on CT scan chest  - 6 mm nodule is seen within the right middle lobe On series 2,  a new area of nodularity within the right apex measuring up to 7 mm. Another irregular area of nodularity seen within the right apex 9 measuring up to 7 mm  - fu outpt     #CAD s/p CABG   - c/w home med.     #Rheumatic heart disease   - f/u outpatient.     #HTN /HLD   - c/w home med.     #Hypothyroidism   - c/w home med.     DVT ppx: on warfarin   GI ppx: PPI   Diet: DASH diet. Speech and Swallow eval.   Activity: as tolerated.    Pending: IV Abx / Home O2   Plan d/w the daughter   Dispo: Home with PT

## 2024-02-13 NOTE — DISCHARGE NOTE NURSING/CASE MANAGEMENT/SOCIAL WORK - PATIENT PORTAL LINK FT
You can access the FollowMyHealth Patient Portal offered by [facility] by registering at the following website: [email or web address]. By joining [facility]’s FollowMyHealth portal, you will also be able to view your health information using other applications (apps) compatible with our system.

## 2024-02-14 ENCOUNTER — TRANSCRIPTION ENCOUNTER (OUTPATIENT)
Age: 84
End: 2024-02-14

## 2024-02-14 VITALS
OXYGEN SATURATION: 92 % | HEART RATE: 60 BPM | SYSTOLIC BLOOD PRESSURE: 133 MMHG | DIASTOLIC BLOOD PRESSURE: 63 MMHG | TEMPERATURE: 97 F

## 2024-02-14 LAB
ALBUMIN SERPL ELPH-MCNC: 3 G/DL — LOW (ref 3.5–5.2)
ALP SERPL-CCNC: 112 U/L — SIGNIFICANT CHANGE UP (ref 30–115)
ALT FLD-CCNC: 45 U/L — HIGH (ref 0–41)
ANION GAP SERPL CALC-SCNC: 15 MMOL/L — HIGH (ref 7–14)
AST SERPL-CCNC: 44 U/L — HIGH (ref 0–41)
BASOPHILS # BLD AUTO: 0.02 K/UL — SIGNIFICANT CHANGE UP (ref 0–0.2)
BASOPHILS NFR BLD AUTO: 0.1 % — SIGNIFICANT CHANGE UP (ref 0–1)
BILIRUB SERPL-MCNC: 0.4 MG/DL — SIGNIFICANT CHANGE UP (ref 0.2–1.2)
BUN SERPL-MCNC: 26 MG/DL — HIGH (ref 10–20)
CALCIUM SERPL-MCNC: 8.7 MG/DL — SIGNIFICANT CHANGE UP (ref 8.4–10.5)
CHLORIDE SERPL-SCNC: 91 MMOL/L — LOW (ref 98–110)
CO2 SERPL-SCNC: 29 MMOL/L — SIGNIFICANT CHANGE UP (ref 17–32)
CREAT SERPL-MCNC: 0.5 MG/DL — LOW (ref 0.7–1.5)
EGFR: 93 ML/MIN/1.73M2 — SIGNIFICANT CHANGE UP
EOSINOPHIL # BLD AUTO: 0.12 K/UL — SIGNIFICANT CHANGE UP (ref 0–0.7)
EOSINOPHIL NFR BLD AUTO: 0.7 % — SIGNIFICANT CHANGE UP (ref 0–8)
GLUCOSE SERPL-MCNC: 119 MG/DL — HIGH (ref 70–99)
HCT VFR BLD CALC: 30.5 % — LOW (ref 37–47)
HGB BLD-MCNC: 9.7 G/DL — LOW (ref 12–16)
IMM GRANULOCYTES NFR BLD AUTO: 0.9 % — HIGH (ref 0.1–0.3)
INR BLD: 1.8 RATIO — HIGH (ref 0.65–1.3)
LYMPHOCYTES # BLD AUTO: 21.9 % — SIGNIFICANT CHANGE UP (ref 20.5–51.1)
LYMPHOCYTES # BLD AUTO: 3.65 K/UL — HIGH (ref 1.2–3.4)
MAGNESIUM SERPL-MCNC: 1.4 MG/DL — LOW (ref 1.8–2.4)
MCHC RBC-ENTMCNC: 26.4 PG — LOW (ref 27–31)
MCHC RBC-ENTMCNC: 31.8 G/DL — LOW (ref 32–37)
MCV RBC AUTO: 83.1 FL — SIGNIFICANT CHANGE UP (ref 81–99)
MONOCYTES # BLD AUTO: 1.91 K/UL — HIGH (ref 0.1–0.6)
MONOCYTES NFR BLD AUTO: 11.5 % — HIGH (ref 1.7–9.3)
NEUTROPHILS # BLD AUTO: 10.8 K/UL — HIGH (ref 1.4–6.5)
NEUTROPHILS NFR BLD AUTO: 64.9 % — SIGNIFICANT CHANGE UP (ref 42.2–75.2)
NRBC # BLD: 0 /100 WBCS — SIGNIFICANT CHANGE UP (ref 0–0)
PLATELET # BLD AUTO: 304 K/UL — SIGNIFICANT CHANGE UP (ref 130–400)
PMV BLD: 9.4 FL — SIGNIFICANT CHANGE UP (ref 7.4–10.4)
POTASSIUM SERPL-MCNC: 3 MMOL/L — LOW (ref 3.5–5)
POTASSIUM SERPL-SCNC: 3 MMOL/L — LOW (ref 3.5–5)
PROT SERPL-MCNC: 6 G/DL — SIGNIFICANT CHANGE UP (ref 6–8)
PROTHROM AB SERPL-ACNC: 20.7 SEC — HIGH (ref 9.95–12.87)
RBC # BLD: 3.67 M/UL — LOW (ref 4.2–5.4)
RBC # FLD: 17.7 % — HIGH (ref 11.5–14.5)
SODIUM SERPL-SCNC: 135 MMOL/L — SIGNIFICANT CHANGE UP (ref 135–146)
WBC # BLD: 16.65 K/UL — HIGH (ref 4.8–10.8)
WBC # FLD AUTO: 16.65 K/UL — HIGH (ref 4.8–10.8)

## 2024-02-14 PROCEDURE — 99232 SBSQ HOSP IP/OBS MODERATE 35: CPT

## 2024-02-14 RX ORDER — MAGNESIUM SULFATE 500 MG/ML
2 VIAL (ML) INJECTION ONCE
Refills: 0 | Status: COMPLETED | OUTPATIENT
Start: 2024-02-14 | End: 2024-02-14

## 2024-02-14 RX ORDER — POTASSIUM CHLORIDE 20 MEQ
20 PACKET (EA) ORAL ONCE
Refills: 0 | Status: COMPLETED | OUTPATIENT
Start: 2024-02-14 | End: 2024-02-14

## 2024-02-14 RX ORDER — POTASSIUM CHLORIDE 20 MEQ
40 PACKET (EA) ORAL ONCE
Refills: 0 | Status: COMPLETED | OUTPATIENT
Start: 2024-02-14 | End: 2024-02-14

## 2024-02-14 RX ORDER — ACETAMINOPHEN 500 MG
2 TABLET ORAL
Refills: 0 | DISCHARGE

## 2024-02-14 RX ADMIN — Medication 50 MILLIEQUIVALENT(S): at 10:31

## 2024-02-14 RX ADMIN — AMLODIPINE BESYLATE 5 MILLIGRAM(S): 2.5 TABLET ORAL at 05:56

## 2024-02-14 RX ADMIN — CHLORHEXIDINE GLUCONATE 1 APPLICATION(S): 213 SOLUTION TOPICAL at 11:06

## 2024-02-14 RX ADMIN — Medication 25 GRAM(S): at 10:33

## 2024-02-14 RX ADMIN — Medication 25 MICROGRAM(S): at 05:56

## 2024-02-14 RX ADMIN — ALBUTEROL 2.5 MILLIGRAM(S): 90 AEROSOL, METERED ORAL at 09:06

## 2024-02-14 RX ADMIN — Medication 100 MILLIGRAM(S): at 05:57

## 2024-02-14 RX ADMIN — Medication 50 MILLIGRAM(S): at 05:56

## 2024-02-14 RX ADMIN — SPIRONOLACTONE 25 MILLIGRAM(S): 25 TABLET, FILM COATED ORAL at 05:56

## 2024-02-14 RX ADMIN — Medication 20 MILLIGRAM(S): at 06:02

## 2024-02-14 RX ADMIN — Medication 40 MILLIGRAM(S): at 05:56

## 2024-02-14 RX ADMIN — Medication 40 MILLIEQUIVALENT(S): at 11:01

## 2024-02-14 NOTE — PROGRESS NOTE ADULT - PROVIDER SPECIALTY LIST ADULT
Hospitalist
Internal Medicine
Internal Medicine
Hospitalist
Internal Medicine
Hospitalist
Internal Medicine
Infectious Disease
Infectious Disease

## 2024-02-14 NOTE — PROGRESS NOTE ADULT - SUBJECTIVE AND OBJECTIVE BOX
SORAYA KING  83y  Female      Patient is a 83y old  Female who presents with a chief complaint of Hypoxia and Fall.      INTERVAL HPI/OVERNIGHT EVENTS:      ******************************* REVIEW OF SYSTEMS:**********************************************    All other review of systems negative    *********************** VITALS ******************************************    T(F): 96.9 (02-14-24 @ 12:39)  HR: 60 (02-14-24 @ 12:39) (60 - 74)  BP: 133/63 (02-14-24 @ 12:39) (133/60 - 134/62)  RR: 18 (02-14-24 @ 04:50) (18 - 18)  SpO2: 92% (02-14-24 @ 12:39) (92% - 94%)    02-13-24 @ 07:01  -  02-14-24 @ 07:00  --------------------------------------------------------  IN: 240 mL / OUT: 900 mL / NET: -660 mL    02-14-24 @ 07:01  -  02-14-24 @ 15:59  --------------------------------------------------------  IN: 230 mL / OUT: 1300 mL / NET: -1070 mL            02-13-24 @ 07:01  -  02-14-24 @ 07:00  --------------------------------------------------------  IN: 240 mL / OUT: 900 mL / NET: -660 mL    02-14-24 @ 07:01  -  02-14-24 @ 15:59  --------------------------------------------------------  IN: 230 mL / OUT: 1300 mL / NET: -1070 mL        ******************************** PHYSICAL EXAM:**************************************************  GENERAL: NAD    PSYCH: no agitation, baseline mentation  HEENT:     NERVOUS SYSTEM:  Alert & Oriented X3,   PULMONARY: MADHU, CTA    CARDIOVASCULAR: S1S2 RRR    GI: Soft, NT, ND; BS present.    EXTREMITIES:  2+ Peripheral Pulses, No clubbing, cyanosis, or edema    LYMPH: No lymphadenopathy noted    SKIN: No rashes or lesions      **************************** LABS *******************************************************                          9.7    16.65 )-----------( 304      ( 14 Feb 2024 07:07 )             30.5     02-14    135  |  91<L>  |  26<H>  ----------------------------<  119<H>  3.0<L>   |  29  |  0.5<L>    Ca    8.7      14 Feb 2024 07:07  Mg     1.4     02-14    TPro  6.0  /  Alb  3.0<L>  /  TBili  0.4  /  DBili  x   /  AST  44<H>  /  ALT  45<H>  /  AlkPhos  112  02-14      Urinalysis Basic - ( 14 Feb 2024 07:07 )    Color: x / Appearance: x / SG: x / pH: x  Gluc: 119 mg/dL / Ketone: x  / Bili: x / Urobili: x   Blood: x / Protein: x / Nitrite: x   Leuk Esterase: x / RBC: x / WBC x   Sq Epi: x / Non Sq Epi: x / Bacteria: x      PT/INR - ( 13 Feb 2024 08:51 )   PT: 27.80 sec;   INR: 2.42 ratio           Lactate Trend        CAPILLARY BLOOD GLUCOSE              **************************Active Medications *******************************************  No Known Allergies      acetaminophen     Tablet .. 650 milliGRAM(s) Oral every 6 hours PRN  albuterol    0.083% 2.5 milliGRAM(s) Nebulizer every 6 hours  amLODIPine   Tablet 5 milliGRAM(s) Oral daily  atorvastatin 40 milliGRAM(s) Oral at bedtime  chlorhexidine 2% Cloths 1 Application(s) Topical daily  doxycycline IVPB 100 milliGRAM(s) IV Intermittent every 12 hours  furosemide    Tablet 40 milliGRAM(s) Oral daily  ketotifen 0.025% Ophthalmic Solution 1 Drop(s) Both EYES daily  levothyroxine 25 MICROGram(s) Oral daily  melatonin 3 milliGRAM(s) Oral at bedtime PRN  metoprolol succinate ER 50 milliGRAM(s) Oral daily  polyethylene glycol 3350 17 Gram(s) Oral daily  predniSONE   Tablet 20 milliGRAM(s) Oral daily  silver sulfADIAZINE 1% Cream 1 Application(s) Topical two times a day PRN  sodium chloride 0.9% for Nebulization 3 milliLiter(s) Nebulizer every 6 hours  spironolactone 25 milliGRAM(s) Oral daily      ***************************************************  RADIOLOGY & ADDITIONAL TESTS:    Imaging Personally Reviewed:  [ ] YES  [ ] NO    HEALTH ISSUES - PROBLEM Dx:  Suspected pulmonary embolism    Suspected deep vein thrombosis (DVT)

## 2024-02-14 NOTE — DISCHARGE NOTE PROVIDER - HOSPITAL COURSE
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     #Sepsis and Acute hypoxic respiratory failure 2/2 RSV with superimposed PNA  - CTA chest: Areas of consolidation within the right middle lobe and lower lobes. Small right pleural effusion with adjacent compressive atelectasis. Areas of nodularity within the right lung not well-seen on prior study may be inflammatory in nature.   - s/p Azithro, Levaquin and Ceftriaxone x 1 given in ED.   - on ceftriaxone and levofloxacin   - f/u BCx, Procalcitonin, MRSA nares, Atypical PNA panel, sputum Cx   - Blood and urine cultures negative 2/7  - speech and swallow eval--- thin liquid; pureed; regular solid  - ID consult--- -Urine for strep pneumonia antigen, Nares ORSA. Sputum gm stain, cultures. BCx  - Rocephin 2 gm iv q24h  - Levaquin 500 mg iv q24h.. DC levofloxacin due to Long QT. switch to doxycyline  .   - Antibiotics end date 2/14      #Paroxysmal A.fib - on coumadin.   - Monitor INR daily and dose coumadin accordingly.   - As per pt her target is 2.5- 3.5 INR     #Wheezing and SOB  - 2/8 night complained SOB  - Started on iv solumedrol  - oe slight wheezes, on suppl o2 NC 2L  - SWITCH TO PO STEROIDS 2/9    #Long QT  - EKG shows QT of 532   - Due to Levofloxacin  - stopped Levofloxacin 2/9. switched to Doxycycline     #Pulmonary nodule seen on CT scan chest  - 6 mm nodule is seen within the right middle lobe On series 2,  a new area of nodularity within the right apex measuring up to 7 mm. Another irregular area of nodularity seen within the right apex 9 measuring up to 7 mm  - fu outpt         #CAD s/p CABG   - c/w home med.     #Rheumatic heart disease   - f/u outpatient.     #HTN /HLD   - c/w home med.     #Hypothyroidism   - c/w home med.

## 2024-02-14 NOTE — DISCHARGE NOTE PROVIDER - NSDCFUSCHEDAPPT_GEN_ALL_CORE_FT
Martina Gagnon  [facility]  CARDIOLOGY [address] [place] [address]  Scheduled Appointment: 03/21/2024

## 2024-02-14 NOTE — PROGRESS NOTE ADULT - ASSESSMENT
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     # Acute hypoxic respiratory failure 2/2 RSV with superimposed PNA    Sepsis POA likely due to RSV PNA / Multilobar PNA    - CTA chest: Areas of consolidation within the right middle lobe and lower lobes. Small right pleural effusion with adjacent compressive atelectasis. Areas of nodularity within the right lung not well-seen on prior study may be inflammatory in nature.   - s/p Azithro, Levaquin and Ceftriaxone x 1 given in ED.   -   Procalcitonin 0.28 , MRSA nares > Neg  - Blood and urine cultures negative 2/7  - - Started on iv solumedrol ?> switched to PO Prednisone   - currently on Rocephin 2 gm iv q24h and IV  doxycyline  as per ID  > will stop on dc.     Seems pt will need home O2.    #Paroxysmal A.fib - on coumadin.   - Monitor INR daily and dose coumadin accordingly.   - As per pt her target is 2.5- 3.5 INR     #Long QT  - EKG shows QT of 532   - Due to Levofloxacin  - stopped Levofloxacin 2/9. switched to Doxycycline     #Pulmonary nodule seen on CT scan chest  - 6 mm nodule is seen within the right middle lobe On series 2,  a new area of nodularity within the right apex measuring up to 7 mm. Another irregular area of nodularity seen within the right apex 9 measuring up to 7 mm  - fu outpt     #CAD s/p CABG   - c/w home med.     #Rheumatic heart disease   - f/u outpatient.     #HTN /HLD   - c/w home med.     #Hypothyroidism   - c/w home med.     DVT ppx: on warfarin   GI ppx: PPI   Diet: DASH diet. Speech and Swallow eval.   Activity: as tolerated.    Pending: Home O2   Plan d/w the daughter   Dispo: Home with PT

## 2024-02-14 NOTE — DISCHARGE NOTE PROVIDER - CARE PROVIDER_API CALL
CERTIFICATE OF WORK    February 19, 2019      Re: Samantha Ivy  [address] [address] [place] [address]      This is to certify that Samantha Ivy has been under my care from 2/19/2019 and had an appointment in my office 2/19/19. SIGNATURE:___________________________________________,   2/19/2019        [address] [facility] [address]  [address] [facility]  [address] [facility], [facility] [place]  [phone] Laury Henson  Pulmonary Disease  [address] [place] [address]  Phone: [phone]  Fax: [phone]  Follow Up Time: 2 weeks

## 2024-02-14 NOTE — DISCHARGE NOTE PROVIDER - NSDCMRMEDTOKEN_GEN_ALL_CORE_FT
acetaminophen 325 mg oral capsule: 2 cap(s) orally every 6 hours as needed for  mild pain  albuterol-budesonide 90 mcg-80 mcg/inh inhalation aerosol: 2 inhaled 2 times a day  Aldactone 25 mg oral tablet: 1 tab(s) orally once a day  alendronate 35 mg oral tablet: 1 tab(s) orally once a week  amLODIPine 5 mg oral tablet: 1 tab(s) orally once a day  benzonatate 200 mg oral capsule: 1 cap(s) orally 3 times a day as needed for  cough  bisacodyl 5 mg oral tablet: 1 tab(s) orally once a day  DuoNeb 0.5 mg-2.5 mg/3 mL inhalation solution: 3 milliliter(s) by nebulizer 4 times a day as needed for  bronchospasm  Lasix 40 mg oral tablet: 1 tab(s) orally once a day  Metoprolol Succinate ER 50 mg oral tablet, extended release: 1 tab(s) orally once a day  MiraLax oral powder for reconstitution: 17 gram(s) orally once a day  olopatadine 0.1% ophthalmic solution: 1 drop(s) in each affected eye once a day  rosuvastatin 20 mg oral tablet: 1 tab(s) orally once a day (at bedtime)  Silvadene 1% topical cream: Apply topically to affected area once a day as needed for  dry skin  Synthroid 25 mcg (0.025 mg) oral tablet: 1 tab(s) orally once a day  warfarin 2.5 mg oral tablet: 1 tab(s) orally once a day

## 2024-02-14 NOTE — DISCHARGE NOTE PROVIDER - NSDCCPCAREPLAN_GEN_ALL_CORE_FT
PRINCIPAL DISCHARGE DIAGNOSIS  Diagnosis: PNA (pneumonia)  Assessment and Plan of Treatment: you were admitted for pneumonia.  Please take your medications as directed. Don’t skip doses. Follow up with your primary care physician within 3 days. Continue taking your antibiotics as directed until they are all gone—even if you start to feel better. This will prevent the pneumonia from reoccuring. Coughing up mucus is normal. Don’t use medicines to suppress your cough unless your cough is dry, painful, or interferes with your sleep. Get plenty of rest until your fever, shortness of breath, and chest pain go away. Plan to get a flu shot every year. Ask your primary care doctor about pneumonia vaccines.  Seek immediate medical attention if you experience chest pain, trouble breathing, blue lips or fingernails, fever of 100.4°F  (38°C) or higher, yellow, green, bloody, or smelly sputum, more than normal mucus production, vomiting or diarrhea.      SECONDARY DISCHARGE DIAGNOSES  Diagnosis: Hypokalemia  Assessment and Plan of Treatment:     Diagnosis: Fall  Assessment and Plan of Treatment:     Diagnosis: Closed head injury  Assessment and Plan of Treatment:     Diagnosis: Hypoxia  Assessment and Plan of Treatment:

## 2024-02-14 NOTE — PROGRESS NOTE ADULT - REASON FOR ADMISSION
Hypoxia and Fall

## 2024-02-21 DIAGNOSIS — I08.1 RHEUMATIC DISORDERS OF BOTH MITRAL AND TRICUSPID VALVES: ICD-10-CM

## 2024-02-21 DIAGNOSIS — J44.0 CHRONIC OBSTRUCTIVE PULMONARY DISEASE WITH (ACUTE) LOWER RESPIRATORY INFECTION: ICD-10-CM

## 2024-02-21 DIAGNOSIS — J98.11 ATELECTASIS: ICD-10-CM

## 2024-02-21 DIAGNOSIS — Z95.3 PRESENCE OF XENOGENIC HEART VALVE: ICD-10-CM

## 2024-02-21 DIAGNOSIS — E87.1 HYPO-OSMOLALITY AND HYPONATREMIA: ICD-10-CM

## 2024-02-21 DIAGNOSIS — E78.5 HYPERLIPIDEMIA, UNSPECIFIED: ICD-10-CM

## 2024-02-21 DIAGNOSIS — E03.9 HYPOTHYROIDISM, UNSPECIFIED: ICD-10-CM

## 2024-02-21 DIAGNOSIS — J15.9 UNSPECIFIED BACTERIAL PNEUMONIA: ICD-10-CM

## 2024-02-21 DIAGNOSIS — D50.9 IRON DEFICIENCY ANEMIA, UNSPECIFIED: ICD-10-CM

## 2024-02-21 DIAGNOSIS — I50.32 CHRONIC DIASTOLIC (CONGESTIVE) HEART FAILURE: ICD-10-CM
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
## 2024-02-21 DIAGNOSIS — J20.5 ACUTE BRONCHITIS DUE TO RESPIRATORY SYNCYTIAL VIRUS: ICD-10-CM

## 2024-02-21 DIAGNOSIS — I44.2 ATRIOVENTRICULAR BLOCK, COMPLETE: ICD-10-CM

## 2024-02-21 DIAGNOSIS — Z79.890 HORMONE REPLACEMENT THERAPY: ICD-10-CM

## 2024-02-21 DIAGNOSIS — I45.81 LONG QT SYNDROME: ICD-10-CM

## 2024-02-21 DIAGNOSIS — S09.90XA UNSPECIFIED INJURY OF HEAD, INITIAL ENCOUNTER: ICD-10-CM

## 2024-02-21 DIAGNOSIS — I48.0 PAROXYSMAL ATRIAL FIBRILLATION: ICD-10-CM
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
## 2024-02-21 DIAGNOSIS — E87.20 ACIDOSIS, UNSPECIFIED: ICD-10-CM

## 2024-02-21 DIAGNOSIS — J96.01 ACUTE RESPIRATORY FAILURE WITH HYPOXIA: ICD-10-CM

## 2024-02-21 DIAGNOSIS — R91.1 SOLITARY PULMONARY NODULE: ICD-10-CM

## 2024-02-21 DIAGNOSIS — Z95.1 PRESENCE OF AORTOCORONARY BYPASS GRAFT: ICD-10-CM

## 2024-02-21 DIAGNOSIS — I25.10 ATHEROSCLEROTIC HEART DISEASE OF NATIVE CORONARY ARTERY WITHOUT ANGINA PECTORIS: ICD-10-CM

## 2024-02-21 DIAGNOSIS — I11.0 HYPERTENSIVE HEART DISEASE WITH HEART FAILURE: ICD-10-CM

## 2024-02-21 DIAGNOSIS — K59.00 CONSTIPATION, UNSPECIFIED: ICD-10-CM

## 2024-02-21 DIAGNOSIS — E87.6 HYPOKALEMIA: ICD-10-CM

## 2024-02-21 DIAGNOSIS — Z79.01 LONG TERM (CURRENT) USE OF ANTICOAGULANTS: ICD-10-CM

## 2024-02-21 DIAGNOSIS — Z95.0 PRESENCE OF CARDIAC PACEMAKER: ICD-10-CM

## 2024-02-21 DIAGNOSIS — E66.9 OBESITY, UNSPECIFIED: ICD-10-CM

## 2024-02-21 DIAGNOSIS — A41.9 SEPSIS, UNSPECIFIED ORGANISM: ICD-10-CM

## 2024-03-21 ENCOUNTER — APPOINTMENT (OUTPATIENT)
Dept: CARDIOLOGY | Facility: CLINIC | Age: 84
End: 2024-03-21
Payer: MEDICARE

## 2024-03-21 VITALS
WEIGHT: 134 LBS | HEIGHT: 59 IN | DIASTOLIC BLOOD PRESSURE: 66 MMHG | SYSTOLIC BLOOD PRESSURE: 112 MMHG | BODY MASS INDEX: 27.01 KG/M2 | HEART RATE: 87 BPM

## 2024-03-21 DIAGNOSIS — Z13.6 ENCOUNTER FOR SCREENING FOR CARDIOVASCULAR DISORDERS: ICD-10-CM

## 2024-03-21 PROCEDURE — 99204 OFFICE O/P NEW MOD 45 MIN: CPT | Mod: 25

## 2024-03-21 PROCEDURE — 99214 OFFICE O/P EST MOD 30 MIN: CPT | Mod: 25

## 2024-03-21 PROCEDURE — 93000 ELECTROCARDIOGRAM COMPLETE: CPT

## 2024-03-21 RX ORDER — ASPIRIN 81 MG
81 TABLET, DELAYED RELEASE (ENTERIC COATED) ORAL DAILY
Refills: 0 | Status: ACTIVE | COMMUNITY

## 2024-03-21 RX ORDER — ALBUTEROL SULFATE 90 UG/1
108 (90 BASE) AEROSOL, METERED RESPIRATORY (INHALATION) EVERY 4 HOURS
Refills: 0 | Status: ACTIVE | COMMUNITY

## 2024-03-21 RX ORDER — ALENDRONATE SODIUM 35 MG/1
35 TABLET ORAL
Refills: 0 | Status: ACTIVE | COMMUNITY

## 2024-03-27 RX ORDER — VALSARTAN AND HYDROCHLOROTHIAZIDE 160; 25 MG/1; MG/1
160-25 TABLET, FILM COATED ORAL DAILY
Refills: 0 | Status: ACTIVE | COMMUNITY

## 2024-03-27 RX ORDER — ROSUVASTATIN CALCIUM 20 MG/1
20 TABLET, FILM COATED ORAL DAILY
Qty: 90 | Refills: 2 | Status: ACTIVE | COMMUNITY

## 2024-03-27 RX ORDER — FUROSEMIDE 40 MG/1
40 TABLET ORAL DAILY
Refills: 0 | Status: ACTIVE | COMMUNITY

## 2024-03-27 RX ORDER — LEVOTHYROXINE SODIUM 0.03 MG/1
25 TABLET ORAL DAILY
Refills: 0 | Status: ACTIVE | COMMUNITY

## 2024-03-27 RX ORDER — OMEPRAZOLE 20 MG/1
20 TABLET, DELAYED RELEASE ORAL DAILY
Refills: 0 | Status: ACTIVE | COMMUNITY

## 2024-03-27 RX ORDER — WARFARIN 2.5 MG/1
2.5 TABLET ORAL DAILY
Refills: 0 | Status: ACTIVE | COMMUNITY

## 2024-03-27 RX ORDER — METOPROLOL SUCCINATE 50 MG/1
50 TABLET, EXTENDED RELEASE ORAL DAILY
Refills: 0 | Status: ACTIVE | COMMUNITY

## 2024-03-27 NOTE — DISCUSSION/SUMMARY
[FreeTextEntry1] : Continue current medications as prescribed on discharge TTE to evaluate MVR prior to next visit Follow-up 3 months

## 2024-03-27 NOTE — HISTORY OF PRESENT ILLNESS
[FreeTextEntry1] : Previously seen at [facility].  Severe MR s/p TMVR and TV repair on 12/13/2023 at [facility] complicated by postop CHB s/p PPM.  Readmitted with sepsis and pneumonia.  PICC line placed and completed antibiotics.  Uses home oxygen sometimes.  Appears comfortable.  Ambulating well. No CP, SOB or palpitations.  EKG - Ventricular-paced rhythm

## 2024-06-27 ENCOUNTER — APPOINTMENT (OUTPATIENT)
Dept: CARDIOLOGY | Facility: CLINIC | Age: 84
End: 2024-06-27
Payer: MEDICARE

## 2024-06-27 VITALS — DIASTOLIC BLOOD PRESSURE: 68 MMHG | SYSTOLIC BLOOD PRESSURE: 120 MMHG

## 2024-06-27 DIAGNOSIS — I10 ESSENTIAL (PRIMARY) HYPERTENSION: ICD-10-CM

## 2024-06-27 DIAGNOSIS — Z78.9 OTHER SPECIFIED HEALTH STATUS: ICD-10-CM

## 2024-06-27 DIAGNOSIS — Z95.2 PRESENCE OF PROSTHETIC HEART VALVE: ICD-10-CM

## 2024-06-27 DIAGNOSIS — Z98.890 OTHER SPECIFIED POSTPROCEDURAL STATES: ICD-10-CM

## 2024-06-27 DIAGNOSIS — I48.91 UNSPECIFIED ATRIAL FIBRILLATION: ICD-10-CM

## 2024-06-27 PROCEDURE — 99214 OFFICE O/P EST MOD 30 MIN: CPT

## 2024-06-27 PROCEDURE — 93306 TTE W/DOPPLER COMPLETE: CPT

## 2024-06-28 PROBLEM — Z95.2 S/P MITRAL VALVE REPLACEMENT: Status: ACTIVE | Noted: 2024-03-21

## 2024-06-28 PROBLEM — Z78.9 NON-SMOKER: Status: ACTIVE | Noted: 2024-03-21

## 2024-10-24 ENCOUNTER — APPOINTMENT (OUTPATIENT)
Dept: CARDIOLOGY | Facility: CLINIC | Age: 84
End: 2024-10-24
Payer: MEDICARE

## 2024-10-24 ENCOUNTER — NON-APPOINTMENT (OUTPATIENT)
Age: 84
End: 2024-10-24

## 2024-10-24 VITALS
DIASTOLIC BLOOD PRESSURE: 70 MMHG | OXYGEN SATURATION: 98 % | HEART RATE: 79 BPM | SYSTOLIC BLOOD PRESSURE: 110 MMHG | WEIGHT: 137 LBS | HEIGHT: 59 IN | BODY MASS INDEX: 27.62 KG/M2

## 2024-10-24 DIAGNOSIS — Z95.2 PRESENCE OF PROSTHETIC HEART VALVE: ICD-10-CM

## 2024-10-24 DIAGNOSIS — L72.3 SEBACEOUS CYST: ICD-10-CM

## 2024-10-24 DIAGNOSIS — I10 ESSENTIAL (PRIMARY) HYPERTENSION: ICD-10-CM

## 2024-10-24 DIAGNOSIS — Z98.890 OTHER SPECIFIED POSTPROCEDURAL STATES: ICD-10-CM

## 2024-10-24 DIAGNOSIS — I48.91 UNSPECIFIED ATRIAL FIBRILLATION: ICD-10-CM

## 2024-10-24 PROCEDURE — 93000 ELECTROCARDIOGRAM COMPLETE: CPT

## 2024-10-24 PROCEDURE — G2211 COMPLEX E/M VISIT ADD ON: CPT

## 2024-10-24 PROCEDURE — 99214 OFFICE O/P EST MOD 30 MIN: CPT

## 2024-11-04 NOTE — PHYSICAL THERAPY INITIAL EVALUATION ADULT - GAIT TRAINING, PT EVAL
transcribed using voice recognition software. Every effort was made to ensure accuracy; however, inadvertent computerized transcription errors may be present.   
Pt will ambulate 50' with supervision and least restrictive assistive device by discharge

## 2025-02-06 ENCOUNTER — APPOINTMENT (OUTPATIENT)
Dept: CARDIOLOGY | Facility: CLINIC | Age: 85
End: 2025-02-06

## 2025-04-16 NOTE — PROGRESS NOTE ADULT - ASSESSMENT
Addended by: VALDO CHANEY on: 4/16/2025 08:23 AM     Modules accepted: Orders     IMPRESSION  clinically improved  RSV bronchitis  Resolving Multilobar bacterial PNA RML/RLL  WBC 16.8 ( on steroids )  2/7 RSV positive  Legionella NG  Nares ORSA NG  2/7 BCx NG  2/7 UCx NG      RECOMMENDATIONS  -Rocephin 2 gm iv q24h  -Doxycycline 100 mg iv q12h  -hold Abx after tomorrows dose    Please do not hesitate to recall ID if any questions arise either through [employer] [id number] or through [employer] teams

## 2025-04-29 ENCOUNTER — APPOINTMENT (OUTPATIENT)
Dept: CARDIOLOGY | Facility: CLINIC | Age: 85
End: 2025-04-29
Payer: MEDICARE

## 2025-04-29 VITALS
DIASTOLIC BLOOD PRESSURE: 72 MMHG | HEIGHT: 59 IN | BODY MASS INDEX: 27.21 KG/M2 | WEIGHT: 135 LBS | HEART RATE: 78 BPM | SYSTOLIC BLOOD PRESSURE: 115 MMHG

## 2025-04-29 DIAGNOSIS — Z98.890 OTHER SPECIFIED POSTPROCEDURAL STATES: ICD-10-CM

## 2025-04-29 DIAGNOSIS — Z13.6 ENCOUNTER FOR SCREENING FOR CARDIOVASCULAR DISORDERS: ICD-10-CM

## 2025-04-29 DIAGNOSIS — Z95.2 PRESENCE OF PROSTHETIC HEART VALVE: ICD-10-CM

## 2025-04-29 DIAGNOSIS — I48.91 UNSPECIFIED ATRIAL FIBRILLATION: ICD-10-CM

## 2025-04-29 DIAGNOSIS — I10 ESSENTIAL (PRIMARY) HYPERTENSION: ICD-10-CM

## 2025-04-29 PROCEDURE — 99214 OFFICE O/P EST MOD 30 MIN: CPT

## 2025-04-29 PROCEDURE — 93000 ELECTROCARDIOGRAM COMPLETE: CPT

## 2025-04-29 PROCEDURE — G2211 COMPLEX E/M VISIT ADD ON: CPT

## 2025-05-26 ENCOUNTER — INPATIENT (INPATIENT)
Facility: HOSPITAL | Age: 85
LOS: 4 days | Discharge: HOME | DRG: 948 | End: 2025-05-31
Attending: INTERNAL MEDICINE | Admitting: STUDENT IN AN ORGANIZED HEALTH CARE EDUCATION/TRAINING PROGRAM
Payer: MEDICARE

## 2025-05-26 VITALS
TEMPERATURE: 98 F | OXYGEN SATURATION: 99 % | WEIGHT: 147.93 LBS | HEART RATE: 85 BPM | DIASTOLIC BLOOD PRESSURE: 63 MMHG | RESPIRATION RATE: 19 BRPM | SYSTOLIC BLOOD PRESSURE: 121 MMHG

## 2025-05-26 DIAGNOSIS — R79.1 ABNORMAL COAGULATION PROFILE: ICD-10-CM

## 2025-05-26 DIAGNOSIS — Z82.69 FAMILY HISTORY OF OTHER DISEASES OF THE MUSCULOSKELETAL SYSTEM AND CONNECTIVE TISSUE: Chronic | ICD-10-CM

## 2025-05-26 LAB
ALBUMIN SERPL ELPH-MCNC: 4.2 G/DL — SIGNIFICANT CHANGE UP (ref 3.5–5.2)
ALP SERPL-CCNC: 77 U/L — SIGNIFICANT CHANGE UP (ref 30–115)
ALT FLD-CCNC: 20 U/L — SIGNIFICANT CHANGE UP (ref 0–41)
APTT BLD: 79.3 SEC — CRITICAL HIGH (ref 27–39.2)
APTT BLD: 95.6 SEC — CRITICAL HIGH (ref 27–39.2)
AST SERPL-CCNC: 33 U/L — SIGNIFICANT CHANGE UP (ref 0–41)
BASOPHILS # BLD AUTO: 0.02 K/UL — SIGNIFICANT CHANGE UP (ref 0–0.2)
BASOPHILS NFR BLD AUTO: 0.1 % — SIGNIFICANT CHANGE UP (ref 0–1)
BILIRUB DIRECT SERPL-MCNC: 0.2 MG/DL — SIGNIFICANT CHANGE UP (ref 0–0.3)
BILIRUB INDIRECT FLD-MCNC: 0.5 MG/DL — SIGNIFICANT CHANGE UP (ref 0.2–1.2)
BILIRUB SERPL-MCNC: 0.7 MG/DL — SIGNIFICANT CHANGE UP (ref 0.2–1.2)
BUN SERPL-MCNC: 44 MG/DL — HIGH (ref 10–20)
CALCIUM SERPL-MCNC: 9.6 MG/DL — SIGNIFICANT CHANGE UP (ref 8.4–10.5)
CHLORIDE SERPL-SCNC: 95 MMOL/L — LOW (ref 98–110)
CO2 SERPL-SCNC: 26 MMOL/L — SIGNIFICANT CHANGE UP (ref 17–32)
CREAT SERPL-MCNC: 1 MG/DL — SIGNIFICANT CHANGE UP (ref 0.7–1.5)
EGFR: 56 ML/MIN/1.73M2 — LOW
EGFR: 56 ML/MIN/1.73M2 — LOW
EOSINOPHIL # BLD AUTO: 0.09 K/UL — SIGNIFICANT CHANGE UP (ref 0–0.7)
EOSINOPHIL NFR BLD AUTO: 0.7 % — SIGNIFICANT CHANGE UP (ref 0–8)
GLUCOSE SERPL-MCNC: 116 MG/DL — HIGH (ref 70–99)
HCT VFR BLD CALC: 32.5 % — LOW (ref 37–47)
HCT VFR BLD CALC: 33.4 % — LOW (ref 37–47)
HGB BLD-MCNC: 10.6 G/DL — LOW (ref 12–16)
HGB BLD-MCNC: 11.2 G/DL — LOW (ref 12–16)
IMM GRANULOCYTES NFR BLD AUTO: 0.5 % — HIGH (ref 0.1–0.3)
INR BLD: 15.44 RATIO — CRITICAL HIGH (ref 0.65–1.3)
INR BLD: 15.52 RATIO — CRITICAL HIGH (ref 0.65–1.3)
INR BLD: 9.14 RATIO — CRITICAL HIGH (ref 0.65–1.3)
LYMPHOCYTES # BLD AUTO: 15 % — LOW (ref 20.5–51.1)
LYMPHOCYTES # BLD AUTO: 2.07 K/UL — SIGNIFICANT CHANGE UP (ref 1.2–3.4)
MCHC RBC-ENTMCNC: 29 PG — SIGNIFICANT CHANGE UP (ref 27–31)
MCHC RBC-ENTMCNC: 29.9 PG — SIGNIFICANT CHANGE UP (ref 27–31)
MCHC RBC-ENTMCNC: 32.6 G/DL — SIGNIFICANT CHANGE UP (ref 32–37)
MCHC RBC-ENTMCNC: 33.5 G/DL — SIGNIFICANT CHANGE UP (ref 32–37)
MCV RBC AUTO: 89 FL — SIGNIFICANT CHANGE UP (ref 81–99)
MCV RBC AUTO: 89.1 FL — SIGNIFICANT CHANGE UP (ref 81–99)
MONOCYTES # BLD AUTO: 1.3 K/UL — HIGH (ref 0.1–0.6)
MONOCYTES NFR BLD AUTO: 9.4 % — HIGH (ref 1.7–9.3)
NEUTROPHILS # BLD AUTO: 10.22 K/UL — HIGH (ref 1.4–6.5)
NEUTROPHILS NFR BLD AUTO: 74.3 % — SIGNIFICANT CHANGE UP (ref 42.2–75.2)
NRBC BLD AUTO-RTO: 0 /100 WBCS — SIGNIFICANT CHANGE UP (ref 0–0)
NRBC BLD AUTO-RTO: 0 /100 WBCS — SIGNIFICANT CHANGE UP (ref 0–0)
PLATELET # BLD AUTO: 236 K/UL — SIGNIFICANT CHANGE UP (ref 130–400)
PLATELET # BLD AUTO: 244 K/UL — SIGNIFICANT CHANGE UP (ref 130–400)
PMV BLD: 10.8 FL — HIGH (ref 7.4–10.4)
PMV BLD: 11.3 FL — HIGH (ref 7.4–10.4)
POTASSIUM SERPL-MCNC: 3.5 MMOL/L — SIGNIFICANT CHANGE UP (ref 3.5–5)
POTASSIUM SERPL-SCNC: 3.5 MMOL/L — SIGNIFICANT CHANGE UP (ref 3.5–5)
PROT SERPL-MCNC: 6.9 G/DL — SIGNIFICANT CHANGE UP (ref 6–8)
PROTHROM AB SERPL-ACNC: >40 SEC — HIGH (ref 9.95–12.87)
RBC # BLD: 3.65 M/UL — LOW (ref 4.2–5.4)
RBC # BLD: 3.75 M/UL — LOW (ref 4.2–5.4)
RBC # FLD: 13.6 % — SIGNIFICANT CHANGE UP (ref 11.5–14.5)
RBC # FLD: 13.6 % — SIGNIFICANT CHANGE UP (ref 11.5–14.5)
SODIUM SERPL-SCNC: 136 MMOL/L — SIGNIFICANT CHANGE UP (ref 135–146)
WBC # BLD: 13.77 K/UL — HIGH (ref 4.8–10.8)
WBC # BLD: 14.05 K/UL — HIGH (ref 4.8–10.8)
WBC # FLD AUTO: 13.77 K/UL — HIGH (ref 4.8–10.8)
WBC # FLD AUTO: 14.05 K/UL — HIGH (ref 4.8–10.8)

## 2025-05-26 PROCEDURE — 85730 THROMBOPLASTIN TIME PARTIAL: CPT

## 2025-05-26 PROCEDURE — 80048 BASIC METABOLIC PNL TOTAL CA: CPT

## 2025-05-26 PROCEDURE — 86900 BLOOD TYPING SEROLOGIC ABO: CPT

## 2025-05-26 PROCEDURE — 93971 EXTREMITY STUDY: CPT | Mod: RT

## 2025-05-26 PROCEDURE — 70450 CT HEAD/BRAIN W/O DYE: CPT | Mod: 26

## 2025-05-26 PROCEDURE — 84100 ASSAY OF PHOSPHORUS: CPT

## 2025-05-26 PROCEDURE — 86901 BLOOD TYPING SEROLOGIC RH(D): CPT

## 2025-05-26 PROCEDURE — 83735 ASSAY OF MAGNESIUM: CPT

## 2025-05-26 PROCEDURE — 85610 PROTHROMBIN TIME: CPT

## 2025-05-26 PROCEDURE — 85025 COMPLETE CBC W/AUTO DIFF WBC: CPT

## 2025-05-26 PROCEDURE — 99222 1ST HOSP IP/OBS MODERATE 55: CPT

## 2025-05-26 PROCEDURE — 86850 RBC ANTIBODY SCREEN: CPT

## 2025-05-26 PROCEDURE — 99285 EMERGENCY DEPT VISIT HI MDM: CPT

## 2025-05-26 PROCEDURE — 36415 COLL VENOUS BLD VENIPUNCTURE: CPT

## 2025-05-26 RX ORDER — METOPROLOL SUCCINATE 50 MG/1
50 TABLET, EXTENDED RELEASE ORAL EVERY 24 HOURS
Refills: 0 | Status: DISCONTINUED | OUTPATIENT
Start: 2025-05-26 | End: 2025-05-31

## 2025-05-26 RX ORDER — ASPIRIN 325 MG
1 TABLET ORAL
Refills: 0 | DISCHARGE

## 2025-05-26 RX ORDER — AMLODIPINE BESYLATE 10 MG/1
5 TABLET ORAL AT BEDTIME
Refills: 0 | Status: DISCONTINUED | OUTPATIENT
Start: 2025-05-26 | End: 2025-05-31

## 2025-05-26 RX ORDER — LIDOCAINE HCL/EPINEPHRINE/PF 1 %-1:200K
5 AMPUL (ML) INJECTION ONCE
Refills: 0 | Status: COMPLETED | OUTPATIENT
Start: 2025-05-26 | End: 2025-05-26

## 2025-05-26 RX ORDER — LEVOTHYROXINE SODIUM 300 MCG
1.5 TABLET ORAL
Refills: 0 | DISCHARGE

## 2025-05-26 RX ORDER — MELATONIN 5 MG
3 TABLET ORAL AT BEDTIME
Refills: 0 | Status: DISCONTINUED | OUTPATIENT
Start: 2025-05-26 | End: 2025-05-31

## 2025-05-26 RX ORDER — ACETAMINOPHEN 500 MG/5ML
650 LIQUID (ML) ORAL EVERY 6 HOURS
Refills: 0 | Status: DISCONTINUED | OUTPATIENT
Start: 2025-05-26 | End: 2025-05-31

## 2025-05-26 RX ORDER — LEVOTHYROXINE SODIUM 300 MCG
37.5 TABLET ORAL DAILY
Refills: 0 | Status: DISCONTINUED | OUTPATIENT
Start: 2025-05-27 | End: 2025-05-31

## 2025-05-26 RX ADMIN — Medication 2000 UNIT(S): at 21:50

## 2025-05-26 RX ADMIN — Medication 5 MILLIGRAM(S): at 05:28

## 2025-05-26 RX ADMIN — Medication 5 MILLILITER(S): at 04:23

## 2025-05-26 NOTE — ED PROVIDER NOTE - PHYSICAL EXAMINATION
GENERAL: NAD,   HEAD:  Atraumatic, Normocephalic  EYES: EOMI, PERRLA, conjunctiva and sclera clear  ENMT: No tonsillar erythema, exudates, or enlargement; Moist mucous membranes, Good dentition, No lesions  NECK: Supple, No JVD, Normal thyroid  NERVOUS SYSTEM:  Alert  PULM: Clear to auscultation bilaterally  CARDIAC: s1s2   GI: Soft, Nontender, Nondistended; Bowel sounds present  EXTREMITIES:  2+ Peripheral Pulses, No clubbing, cyanosis, or edema  LYMPH: No lymphadenopathy noted  SKIN: No rashes or lesions

## 2025-05-26 NOTE — H&P ADULT - ATTENDING COMMENTS
On exam Main ED 05 , Pt daughter at bedside   General: awake, alert, NAD, Upper lip minor bleeding evidence   Lungs:  clear to ausculations b/l, normal resp effort  Heart: regular ryhthm   Abdomen: soft, non tender non distended  Ext: no edema, can move all  his extremities , left forearm ecchymosis ,swelling , non tender     I have personally seen and examined the patient.  I fully participated in the care of this patient. I DISCUSSED WITH THE RESIDENT AND I EDITED THE ABOVE NOTE

## 2025-05-26 NOTE — ED PROVIDER NOTE - OBJECTIVE STATEMENT
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  Patient said she noticed an bleed on her lip but thought it was coming from inside her.  Patient kept coughing up blood and was concerned it was coming from her lungs.  Patient denies any fevers chills smoking history sputum production nosebleed stomach pain.

## 2025-05-26 NOTE — ED PROVIDER NOTE - ATTENDING CONTRIBUTION TO CARE
84 F to ED with blood on her lip  pt with nonstop bleeding from upper lip skin tear  h/o CAD, afib on AC (coumadin)  last INR 2.5  pt to ED for eval.

## 2025-05-26 NOTE — ED ADULT NURSE NOTE - NSFALLUNIVINTERV_ED_ALL_ED
Bed/Stretcher in lowest position, wheels locked, appropriate side rails in place/Call bell, personal items and telephone in reach/Instruct patient to call for assistance before getting out of bed/chair/stretcher/Non-slip footwear applied when patient is off stretcher/[place] to call system/Physically safe environment - no spills, clutter or unnecessary equipment/Purposeful proactive rounding/Room/bathroom lighting operational, light cord in reach

## 2025-05-26 NOTE — ED ADULT NURSE REASSESSMENT NOTE - NS ED NURSE REASSESS COMMENT FT1
received pt from outgoing RN. pt a/ox4, denies pain or discomfort. safety measures in place, call bell in reach.

## 2025-05-26 NOTE — PATIENT PROFILE ADULT - FALL HARM RISK - RISK INTERVENTIONS
Assistance with ambulation/Communicate Fall Risk and Risk Factors to all staff, patient, and family/Monitor for mental status changes/Monitor gait and stability/Sit up slowly, dangle for a short time, stand at bedside before walking/Toileting schedule using arm’s reach rule for commode and bathroom/Use of alarms - bed, chair and/or voice tab/Bed in lowest position, wheels locked, appropriate side rails in place/Call bell, personal items and telephone in reach/Instruct patient to call for assistance before getting out of bed or chair/Non-slip footwear when patient is out of bed/[place] to call system/Physically safe environment - no spills, clutter or unnecessary equipment/Purposeful Proactive Rounding/Room/bathroom lighting operational, light cord in reach

## 2025-05-26 NOTE — H&P ADULT - ASSESSMENT
85 yo F w PMH of AF on warfarin, rheumatic MV disease, severe MR and TR s/p bioprosthetic MV replacement and TV annuloplasty on 12/13/2023 at [facility], c/b CHB s/p Micra opn 12/17, hypothyroidism, presented to the ER for lip bleeding that started around 2 am on 5/26.    #R upper lip bleeding  #L anterior forearm bruise  #Supratherapeutic INR   - No other source of bleeding identified on H&P.  - Triage VS: /63, HR 85, T 98.1F, SpO2 99% on RA.  - Labs: CBC stable (with chronic leukocytosis), PT > 40, INR 15.44, aPTT 95.6, creat 1 (baseline 0.6), LFTs unremarkable.  - CTH non con: No acute intracranial pathology. No evidence of midline shift, mass effect or intracranial hemorrhage.  - ED interventions: 5mg PO phytonadione.  - Warfarin dose was doubled from 2.5mg QD to 5mg QD in April 2025.  - Her INR on 5/8/25 was 2.5 per daughter at PCP's office.  - No other clear interactions unraveled.  Plan:  - Hold off on further vitamin K for now and monitor INR closely.  - Q8H neuro checks to detect signs concerning for ICH.  - Monitor CBC, maintain active T&S.    #Rheumatic MV disease  #Severe MR s/p bioprosthetic MV replacement  #Severe TR s/p TV annuloplasty on 12/13/2023  #CHB s/p Micra opn 12/17  #HTN  #Afib, on warfarin  - Patient saw Dr. Gagnon in April 2025. Patient and daughter requested that I make her aware that the patient is in the hospital. Teams message sent.  - Holding warfarin as above.  - Holding valsartan-HCTZ for HILARY.  - Holding furosemide for HILARY.  - Holding ASA 81mg QD for elevated bleeding risk as above.  - Continue amlodipine 5 mg QD and metoprolol succinate 50mg QD.    #Chronic leukocytosis  #Monocytosis  - Noted elevated WBC count with monocytosis chronically.  - Discussed possible etiologies including hematological malignancy with patient and daughter Purvi.  - She has not seen a hematologist in the past.  - They prefer to discuss amongst the family whether they want to pursue workup for this or not. For now they request to hold off on workup for this as inpatient.    #Hypothyroidism  - Continue levothyroxine 37.5mcg QD.    #DIET: DASH/TLC  #DVTppx: Supratherapeutic INR  #GIppx: NI  #Activity: Increase as tolerated, noted to ambulate with her daughter in main ED.  #CODE: FULL  #Disposition: from home, admit to medicine. 85 yo F w PMH of AF on warfarin, rheumatic MV disease, severe MR and TR s/p bioprosthetic MV replacement and TV annuloplasty on 12/13/2023 at [facility], c/b CHB s/p Micra opn 12/17, hypothyroidism, presented to the ER for lip bleeding that started around 2 am on 5/26.    #R upper lip bleeding  #L anterior forearm bruise/ Likely hematoma   #Supratherapeutic INR  with minimal bleeding   - No other source of bleeding identified on H&P.  - Triage VS: /63, HR 85, T 98.1F, SpO2 99% on RA.  - Labs: CBC stable (with chronic leukocytosis), PT > 40, INR 15.44, aPTT 95.6, creat 1 (baseline 0.6), LFTs unremarkable.  - CTH non con: No acute intracranial pathology. No evidence of midline shift, mass effect or intracranial hemorrhage.  - ED interventions: 5mg PO phytonadione.  - Warfarin dose was doubled from 2.5mg QD to 5mg QD in April 2025.  - Her INR on 5/8/25 was 2.5 per daughter at PCP's office.  - No other clear interactions unraveled.  Plan:  - Hold off on further vitamin K for now and monitor INR closely.  - Q8H neuro checks to detect signs concerning for ICH.  bed rest for today and fall precautions   - Monitor CBC, maintain active T&S.  keep 2 large IV access  transfuse to keep Hgb >8  monitor hemodynamics  monitor left arm hematoma,if worseing m get STAT CT and Surgey eval       #Rheumatic MV disease  #Severe MR s/p bioprosthetic MV replacement  #Severe TR s/p TV annuloplasty on 12/13/2023  #CHB s/p Micra opn 12/17  #HTN  #Afib, on warfarin  Acute Kidney Injury     - Patient saw Dr. Gagnon in April 2025. Patient and daughter requested that I make her aware that the patient is in the hospital. Teams message sent.  - Holding warfarin as above.  - Holding valsartan-HCTZ for HILARY.  - Holding furosemide for HILARY.  - Holding ASA 81mg QD for  today elevated bleeding risk as above. Resume TOMORROW IF INR BETTER AND NO BLEEDING   - Continue amlodipine 5 mg QD and metoprolol succinate 50mg QD.  monitor electrolytes   strickt I/Os  avoid nephrotoxins   trend creatinine      #Chronic leukocytosis  #Monocytosis  - Noted elevated WBC count with monocytosis chronically.  - Discussed possible etiologies including hematological malignancy with patient and daughter Purvi.  - She has not seen a hematologist in the past.  - They prefer to discuss amongst the family whether they want to pursue workup for this or not. For now they request to hold off on workup for this as inpatient.    #Hypothyroidism  - Continue levothyroxine 37.5mcg QD.  check TSH     #DIET: DASH/TLC  #DVTppx: Supratherapeutic INR  #GIppx: PPI  #Activity: bed rest today   #CODE: FULL  #Disposition: from home, admit to medicine.

## 2025-05-26 NOTE — H&P ADULT - HISTORY OF PRESENT ILLNESS
85 yo F w PMH of AF on warfarin, rheumatic MV disease, severe MR and TR s/p bioprosthetic MV replacement and TV annuloplasty on 12/13/2023 at [facility], c/b CHB s/p Micra opn 12/17, hypothyroidism, presented to the ER for bloody sputum.  Patient said she noticed an bleed on her lip but thought it was coming from inside her. Patient kept coughing up blood and was concerned it was coming from her lungs. Patient denies any fevers chills smoking history sputum production nosebleed stomach pain.    Triage VS: /63, HR 85, T 98.1F, SpO2 99% on RA.  Labs: CBC stable (with chronic leukocytosis), PT > 40, INR 15.44, aPTT 95.6, creat 1 (baseline 0.6), LFTs unremarkable.  CTH non con: No acute intracranial pathology. No evidence of midline shift, mass effect or intracranial hemorrhage.    ED interventions: 5mg PO phytonadione. 85 yo F w PMH of AF on warfarin, rheumatic MV disease, severe MR and TR s/p bioprosthetic MV replacement and TV annuloplasty on 12/13/2023 at [facility], c/b CHB s/p Micra opn 12/17, hypothyroidism, presented to the ER for lip bleeding that started around 2 am on 5/26. She denies any known trauma to the site. Initially she thought the bleeding was from within her mouth or internal, later realized that it is coming from her right upper outer lip. It is a slow oozing, no profuse bleeding. Denies headache, dizziness, hemoptysis, hematemesis, melena, hematochezia, hematuria. Reports a bruise on L forearm that started prior to arriving at hospital, now somewhat increased in diameter. Denies CP. ROS positive for chronic dyspnea, unchanged from baseline.    Triage VS: /63, HR 85, T 98.1F, SpO2 99% on RA.  Labs: CBC stable (with chronic leukocytosis), PT > 40, INR 15.44, aPTT 95.6, creat 1 (baseline 0.6), LFTs unremarkable.  CTH non con: No acute intracranial pathology. No evidence of midline shift, mass effect or intracranial hemorrhage.    ED interventions: 5mg PO phytonadione.

## 2025-05-27 LAB
ANION GAP SERPL CALC-SCNC: 13 MMOL/L — SIGNIFICANT CHANGE UP (ref 7–14)
APTT BLD: 38.7 SEC — SIGNIFICANT CHANGE UP (ref 27–39.2)
BASOPHILS # BLD AUTO: 0.03 K/UL — SIGNIFICANT CHANGE UP (ref 0–0.2)
BASOPHILS NFR BLD AUTO: 0.2 % — SIGNIFICANT CHANGE UP (ref 0–1)
BUN SERPL-MCNC: 30 MG/DL — HIGH (ref 10–20)
CALCIUM SERPL-MCNC: 9.2 MG/DL — SIGNIFICANT CHANGE UP (ref 8.4–10.5)
CHLORIDE SERPL-SCNC: 94 MMOL/L — LOW (ref 98–110)
CO2 SERPL-SCNC: 29 MMOL/L — SIGNIFICANT CHANGE UP (ref 17–32)
CREAT SERPL-MCNC: 0.8 MG/DL — SIGNIFICANT CHANGE UP (ref 0.7–1.5)
EGFR: 73 ML/MIN/1.73M2 — SIGNIFICANT CHANGE UP
EGFR: 73 ML/MIN/1.73M2 — SIGNIFICANT CHANGE UP
EOSINOPHIL # BLD AUTO: 0.14 K/UL — SIGNIFICANT CHANGE UP (ref 0–0.7)
EOSINOPHIL NFR BLD AUTO: 1 % — SIGNIFICANT CHANGE UP (ref 0–8)
GLUCOSE SERPL-MCNC: 100 MG/DL — HIGH (ref 70–99)
HCT VFR BLD CALC: 31 % — LOW (ref 37–47)
HGB BLD-MCNC: 10.1 G/DL — LOW (ref 12–16)
IMM GRANULOCYTES NFR BLD AUTO: 0.5 % — HIGH (ref 0.1–0.3)
INR BLD: 2.38 RATIO — HIGH (ref 0.65–1.3)
LYMPHOCYTES # BLD AUTO: 19.7 % — LOW (ref 20.5–51.1)
LYMPHOCYTES # BLD AUTO: 2.86 K/UL — SIGNIFICANT CHANGE UP (ref 1.2–3.4)
MAGNESIUM SERPL-MCNC: 2.1 MG/DL — SIGNIFICANT CHANGE UP (ref 1.8–2.4)
MCHC RBC-ENTMCNC: 28.9 PG — SIGNIFICANT CHANGE UP (ref 27–31)
MCHC RBC-ENTMCNC: 32.6 G/DL — SIGNIFICANT CHANGE UP (ref 32–37)
MCV RBC AUTO: 88.8 FL — SIGNIFICANT CHANGE UP (ref 81–99)
MONOCYTES # BLD AUTO: 1.5 K/UL — HIGH (ref 0.1–0.6)
MONOCYTES NFR BLD AUTO: 10.4 % — HIGH (ref 1.7–9.3)
NEUTROPHILS # BLD AUTO: 9.89 K/UL — HIGH (ref 1.4–6.5)
NEUTROPHILS NFR BLD AUTO: 68.2 % — SIGNIFICANT CHANGE UP (ref 42.2–75.2)
NRBC BLD AUTO-RTO: 0 /100 WBCS — SIGNIFICANT CHANGE UP (ref 0–0)
PHOSPHATE SERPL-MCNC: 2.7 MG/DL — SIGNIFICANT CHANGE UP (ref 2.1–4.9)
PLATELET # BLD AUTO: 221 K/UL — SIGNIFICANT CHANGE UP (ref 130–400)
PMV BLD: 11.3 FL — HIGH (ref 7.4–10.4)
POTASSIUM SERPL-MCNC: 3.6 MMOL/L — SIGNIFICANT CHANGE UP (ref 3.5–5)
POTASSIUM SERPL-SCNC: 3.6 MMOL/L — SIGNIFICANT CHANGE UP (ref 3.5–5)
PROTHROM AB SERPL-ACNC: 28.6 SEC — HIGH (ref 9.95–12.87)
RBC # BLD: 3.49 M/UL — LOW (ref 4.2–5.4)
RBC # FLD: 13.6 % — SIGNIFICANT CHANGE UP (ref 11.5–14.5)
SODIUM SERPL-SCNC: 136 MMOL/L — SIGNIFICANT CHANGE UP (ref 135–146)
WBC # BLD: 14.49 K/UL — HIGH (ref 4.8–10.8)
WBC # FLD AUTO: 14.49 K/UL — HIGH (ref 4.8–10.8)

## 2025-05-27 PROCEDURE — 99232 SBSQ HOSP IP/OBS MODERATE 35: CPT

## 2025-05-27 RX ORDER — FUROSEMIDE 10 MG/ML
40 INJECTION INTRAMUSCULAR; INTRAVENOUS DAILY
Refills: 0 | Status: DISCONTINUED | OUTPATIENT
Start: 2025-05-27 | End: 2025-05-31

## 2025-05-27 RX ORDER — ASPIRIN 325 MG
81 TABLET ORAL DAILY
Refills: 0 | Status: DISCONTINUED | OUTPATIENT
Start: 2025-05-27 | End: 2025-05-31

## 2025-05-27 RX ADMIN — METOPROLOL SUCCINATE 50 MILLIGRAM(S): 50 TABLET, EXTENDED RELEASE ORAL at 12:33

## 2025-05-27 RX ADMIN — Medication 81 MILLIGRAM(S): at 16:47

## 2025-05-27 RX ADMIN — Medication 4 MILLIGRAM(S): at 21:56

## 2025-05-27 RX ADMIN — Medication 37.5 MICROGRAM(S): at 05:10

## 2025-05-27 RX ADMIN — Medication 2000 UNIT(S): at 21:28

## 2025-05-27 RX ADMIN — Medication 1 APPLICATION(S): at 05:12

## 2025-05-27 NOTE — PROGRESS NOTE ADULT - ATTENDING COMMENTS
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    #R upper lip bleeding  #L anterior forearm bruise/ Likely hematoma   #Supratherapeutic INR  with minimal bleeding   - No other source of bleeding identified on H&P.  - Triage VS: /63, HR 85, T 98.1F, SpO2 99% on RA.  - Labs: CBC stable (with chronic leukocytosis), PT > 40, INR 15.44, aPTT 95.6, creat 1 (baseline 0.6), LFTs unremarkable.  - CTH non con: No acute intracranial pathology. No evidence of midline shift, mass effect or intracranial hemorrhage.  - ED interventions: 5mg PO phytonadione.  - Warfarin dose was doubled from 2.5mg QD to 5mg QD in April 2025.  - Her INR on 5/8/25 was 2.5 per daughter at PCP's office.  - No other clear interactions unraveled.  Plan:  - Hold off on further vitamin K for now and monitor INR closely.  - Q8H neuro checks to detect signs concerning for ICH.  bed rest for today and fall precautions   - Monitor CBC, maintain active T&S.  keep 2 large IV access  transfuse to keep Hgb >8  monitor hemodynamics  monitor left arm hematoma,if worseing m get STAT CT and Surgey eval       #Rheumatic MV disease  #Severe MR s/p bioprosthetic MV replacement  #Severe TR s/p TV annuloplasty on 12/13/2023  #CHB s/p Micra opn 12/17  #HTN  #Afib, on warfarin  Acute Kidney Injury     - Patient saw Dr. Gagnon in April 2025. Patient and daughter requested that I make her aware that the patient is in the hospital. Teams message sent.  - restart warfarin   - restart valsartan  hold HCTZ - restart if BP elevated  - restart furosemide   - restart ASA 81mg QD   - Continue amlodipine 5 mg QD and metoprolol succinate 50mg QD.  monitor electrolytes   strickt I/Os  avoid nephrotoxins   trend creatinine      #Chronic leukocytosis  #Monocytosis  - Noted elevated WBC count with monocytosis chronically.  - Discussed possible etiologies including hematological malignancy with patient and daughter Purvi.  - She has not seen a hematologist in the past.  - They prefer to discuss amongst the family whether they want to pursue workup for this or not. For now they request to hold off on workup for this as inpatient.    #Hypothyroidism  - Continue levothyroxine 37.5mcg QD.  check TSH     #DIET: DASH/TLC  #DVTppx: Supratherapeutic INR   #CODE: FULL  #Disposition: from home, admit to medicine.    pending: monitor INR and bleeding

## 2025-05-27 NOTE — PROGRESS NOTE ADULT - ASSESSMENT
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    #R upper lip bleeding (Resolved)  #L anterior forearm bruise/ Likely hematoma (Improving)  #Supratherapeutic INR  with minimal bleeding (Resolved)  - In ED, CBC stable (with chronic leukocytosis), PT > 40, INR 15.44, aPTT 95.6, creat 1 (baseline 0.6), LFTs unremarkable.  - CTH non con: No acute intracranial pathology. No evidence of midline shift, mass effect or intracranial hemorrhage.  - ED interventions: 5mg PO phytonadione.  - Warfarin dose was doubled from 2.5mg QD to 5mg QD in April 2025. --> Per daughter, Pt was taking 10 mg by mistake afterwards. She took 10 (2 Pills of 5) the day before admission.  - Monitor INR closely.  - Q8H neuro checks to detect signs concerning for ICH.  - Monitor CBC, maintain active T&S,   - Keep 2 large IV access  - Transfuse to keep Hgb >8  - Today, INR is 2.38. Will order warfarin 4mg tonight.  - If INR becomes subtherapeutic, will start lovenox       #Rheumatic MV disease  #Severe MR s/p bioprosthetic MV replacement  #Severe TR s/p TV annuloplasty on 12/13/2023  #CHB s/p Micra opn 12/17  #HTN  #Afib, on warfarin  #Acute Kidney Injury (Resolved)  - Restart warfarin tonight.  - Restart valsartan-HCTZ for HILARY.  - Restart furosemide for HILARY.  - Restart ASA 81mg QD today  - Continue amlodipine 5 mg QD and metoprolol succinate 50mg QD.  monitor electrolytes   strickt I/Os  avoid nephrotoxins   trend creatinine      #Chronic leukocytosis  #Monocytosis  - Noted elevated WBC count with monocytosis chronically.  - Discussed possible etiologies including hematological malignancy with patient and daughter Purvi.  - She has not seen a hematologist in the past.  - They prefer to discuss amongst the family whether they want to pursue workup for this or not. For now they request to hold off on workup for this as inpatient.    #Hypothyroidism  - Continue levothyroxine 37.5mcg QD.  check TSH     #DIET: DASH/TLC  #GIppx: PPI  #Activity: bed rest today   #CODE: FULL  #Disposition: from home, admit to medicine.   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    #R upper lip bleeding (Resolved)  #L anterior forearm bruise/ Likely hematoma (Improving)  #Supratherapeutic INR  with minimal bleeding (Resolved)  - In ED, CBC stable (with chronic leukocytosis), PT > 40, INR 15.44, aPTT 95.6, creat 1 (baseline 0.6), LFTs unremarkable.  - CTH non con: No acute intracranial pathology. No evidence of midline shift, mass effect or intracranial hemorrhage.  - ED interventions: 5mg PO phytonadione.  - Warfarin dose was doubled from 2.5mg QD to 5mg QD in April 2025. --> Per daughter, Pt was taking 10 mg by mistake afterwards. She took 10 (2 Pills of 5) the day before admission.  - Monitor INR closely.  - Q8H neuro checks to detect signs concerning for ICH.  - Monitor CBC, maintain active T&S,   - Keep 2 large IV access  - Transfuse to keep Hgb >8  - Today, INR is 2.38. Will order warfarin 4mg tonight.  - If INR becomes subtherapeutic, will start lovenox       #Rheumatic MV disease  #Severe MR s/p bioprosthetic MV replacement  #Severe TR s/p TV annuloplasty on 12/13/2023  #CHB s/p Micra opn 12/17  #HTN  #Afib, on warfarin  #Acute Kidney Injury (Resolved)  - Restart warfarin tonight.  - Restart valsartan  - Restart furosemide  - Restart ASA 81mg QD today  - Hold HCTZ, if BP is elevated restart  - Continue amlodipine 5 mg QD and metoprolol succinate 50mg QD.  monitor electrolytes   strickt I/Os  avoid nephrotoxins   trend creatinine      #Chronic leukocytosis  #Monocytosis  - Noted elevated WBC count with monocytosis chronically.  - Discussed possible etiologies including hematological malignancy with patient and daughter Purvi.  - She has not seen a hematologist in the past.  - They prefer to discuss amongst the family whether they want to pursue workup for this or not. For now they request to hold off on workup for this as inpatient.    #Hypothyroidism  - Continue levothyroxine 37.5mcg QD.  check TSH     #DIET: DASH/TLC  #GIppx: PPI  #Activity: bed rest today   #CODE: FULL  #Disposition: from home, admit to medicine.

## 2025-05-27 NOTE — PHARMACOTHERAPY INTERVENTION NOTE - COMMENTS
84yFemale    Indication:  INR Goal:  Home Dose:  Bridge Therapy:    Current Medications:  acetaminophen     Tablet .. 650 milliGRAM(s) Oral every 6 hours PRN  amLODIPine   Tablet 5 milliGRAM(s) Oral at bedtime  aspirin enteric coated 81 milliGRAM(s) Oral daily  chlorhexidine 2% Cloths 1 Application(s) Topical <User Schedule>  cholecalciferol 2000 Unit(s) Oral every 24 hours  furosemide    Tablet 40 milliGRAM(s) Oral daily  levothyroxine 37.5 MICROGram(s) Oral daily  melatonin 3 milliGRAM(s) Oral at bedtime PRN  metoprolol succinate ER 50 milliGRAM(s) Oral every 24 hours  valsartan 160 milliGRAM(s) Oral daily  warfarin 4 milliGRAM(s) Oral once      hemoglobin 10.1 g/dL (05-27-25 @ 07:53)    hematocrit 31.0 % (05-27-25 @ 07:53)    PLT: 221 K/uL (05-27-25 @ 07:53)    GFR:73 mL/min/1.73m2 (05-27-25 @ 07:53)      Drug Interactions:     INR trend  15.44 ratio (05-26-25 @ 04:10)  15.52 ratio (05-26-25 @ 11:54)  9.14 ratio (05-26-25 @ 15:49)  2.38 ratio (05-27-25 @ 07:53)      Warfarin administration history:        1. INR today is:               [   ] below goal ----- This is likely due to                                            [   ] at goal                                            [   ] above goal ------ This is likely due to        2. Discuss with provider(s) listed                                            [     ] to consider  Warfarin           PO x 1                                            [     ] provider recommended warfarin                 PO x 1                                           [     ] provider will order warfarin                     PO x 1     3. Obtain INR tomorrow AM     84yFemale    Indication: Afib, bioprosthetic MV  INR Goal: 2-3 Confirmed with Dr. Reyes  Home Dose: Warfarin 5mg PO daily  Bridge Therapy:    Current Medications:  acetaminophen     Tablet .. 650 milliGRAM(s) Oral every 6 hours PRN  amLODIPine   Tablet 5 milliGRAM(s) Oral at bedtime  aspirin enteric coated 81 milliGRAM(s) Oral daily  chlorhexidine 2% Cloths 1 Application(s) Topical <User Schedule>  cholecalciferol 2000 Unit(s) Oral every 24 hours  furosemide    Tablet 40 milliGRAM(s) Oral daily  levothyroxine 37.5 MICROGram(s) Oral daily  melatonin 3 milliGRAM(s) Oral at bedtime PRN  metoprolol succinate ER 50 milliGRAM(s) Oral every 24 hours  valsartan 160 milliGRAM(s) Oral daily  warfarin 4 milliGRAM(s) Oral once      hemoglobin 10.1 g/dL (05-27-25 @ 07:53)    hematocrit 31.0 % (05-27-25 @ 07:53)    PLT: 221 K/uL (05-27-25 @ 07:53)    GFR:73 mL/min/1.73m2 (05-27-25 @ 07:53)      Drug Interactions:     INR trend  15.44 ratio (05-26-25 @ 04:10)  15.52 ratio (05-26-25 @ 11:54)  9.14 ratio (05-26-25 @ 15:49)  2.38 ratio (05-27-25 @ 07:53)      Warfarin administration history:        1. INR today is: 2.38             [   ] below goal ----- This is likely due to                                            [ x  ] at goal                                            [   ] above goal ------ This is likely due to        2. Discuss with provider(s) listed                                            [     ] to consider  Warfarin           PO x 1                                            [  x   ] provider recommended warfarin 4mg PO x 1                                           [  x   ] provider will order warfarin 4mg PO x 1  Patient presented with supratherapeutic INR of 15.44 on 05/26. Per MD, patient recently had a dose increase for warfarin from warfarin 2.5mg PO daily to 5mg PO daily, but by mistake patient had been taking Warfarin 10mg at home for few days. INR currently 2.38. Will monitor and follow up tomorrow    3. Obtain INR tomorrow AM

## 2025-05-28 ENCOUNTER — TRANSCRIPTION ENCOUNTER (OUTPATIENT)
Age: 85
End: 2025-05-28

## 2025-05-28 LAB
ANION GAP SERPL CALC-SCNC: 14 MMOL/L — SIGNIFICANT CHANGE UP (ref 7–14)
APTT BLD: 37.9 SEC — SIGNIFICANT CHANGE UP (ref 27–39.2)
BASOPHILS # BLD AUTO: 0.03 K/UL — SIGNIFICANT CHANGE UP (ref 0–0.2)
BASOPHILS NFR BLD AUTO: 0.2 % — SIGNIFICANT CHANGE UP (ref 0–1)
BUN SERPL-MCNC: 34 MG/DL — HIGH (ref 10–20)
CALCIUM SERPL-MCNC: 9.2 MG/DL — SIGNIFICANT CHANGE UP (ref 8.4–10.5)
CHLORIDE SERPL-SCNC: 95 MMOL/L — LOW (ref 98–110)
CO2 SERPL-SCNC: 25 MMOL/L — SIGNIFICANT CHANGE UP (ref 17–32)
CREAT SERPL-MCNC: 1.1 MG/DL — SIGNIFICANT CHANGE UP (ref 0.7–1.5)
EGFR: 50 ML/MIN/1.73M2 — LOW
EGFR: 50 ML/MIN/1.73M2 — LOW
EOSINOPHIL # BLD AUTO: 0.12 K/UL — SIGNIFICANT CHANGE UP (ref 0–0.7)
EOSINOPHIL NFR BLD AUTO: 0.8 % — SIGNIFICANT CHANGE UP (ref 0–8)
GLUCOSE SERPL-MCNC: 137 MG/DL — HIGH (ref 70–99)
HCT VFR BLD CALC: 29 % — LOW (ref 37–47)
HGB BLD-MCNC: 9.6 G/DL — LOW (ref 12–16)
IMM GRANULOCYTES NFR BLD AUTO: 0.7 % — HIGH (ref 0.1–0.3)
INR BLD: 2.35 RATIO — HIGH (ref 0.65–1.3)
LYMPHOCYTES # BLD AUTO: 1.69 K/UL — SIGNIFICANT CHANGE UP (ref 1.2–3.4)
LYMPHOCYTES # BLD AUTO: 11.2 % — LOW (ref 20.5–51.1)
MCHC RBC-ENTMCNC: 29.2 PG — SIGNIFICANT CHANGE UP (ref 27–31)
MCHC RBC-ENTMCNC: 33.1 G/DL — SIGNIFICANT CHANGE UP (ref 32–37)
MCV RBC AUTO: 88.1 FL — SIGNIFICANT CHANGE UP (ref 81–99)
MONOCYTES # BLD AUTO: 1.29 K/UL — HIGH (ref 0.1–0.6)
MONOCYTES NFR BLD AUTO: 8.6 % — SIGNIFICANT CHANGE UP (ref 1.7–9.3)
NEUTROPHILS # BLD AUTO: 11.83 K/UL — HIGH (ref 1.4–6.5)
NEUTROPHILS NFR BLD AUTO: 78.5 % — HIGH (ref 42.2–75.2)
NRBC BLD AUTO-RTO: 0 /100 WBCS — SIGNIFICANT CHANGE UP (ref 0–0)
PLATELET # BLD AUTO: 221 K/UL — SIGNIFICANT CHANGE UP (ref 130–400)
PMV BLD: 11.3 FL — HIGH (ref 7.4–10.4)
POTASSIUM SERPL-MCNC: 3.2 MMOL/L — LOW (ref 3.5–5)
POTASSIUM SERPL-SCNC: 3.2 MMOL/L — LOW (ref 3.5–5)
PROTHROM AB SERPL-ACNC: 28.2 SEC — HIGH (ref 9.95–12.87)
RBC # BLD: 3.29 M/UL — LOW (ref 4.2–5.4)
RBC # FLD: 13.6 % — SIGNIFICANT CHANGE UP (ref 11.5–14.5)
SODIUM SERPL-SCNC: 134 MMOL/L — LOW (ref 135–146)
WBC # BLD: 15.06 K/UL — HIGH (ref 4.8–10.8)
WBC # FLD AUTO: 15.06 K/UL — HIGH (ref 4.8–10.8)

## 2025-05-28 PROCEDURE — 93971 EXTREMITY STUDY: CPT | Mod: 26,RT

## 2025-05-28 PROCEDURE — 99232 SBSQ HOSP IP/OBS MODERATE 35: CPT

## 2025-05-28 RX ORDER — ACETAMINOPHEN 500 MG/5ML
975 LIQUID (ML) ORAL ONCE
Refills: 0 | Status: COMPLETED | OUTPATIENT
Start: 2025-05-28 | End: 2025-05-28

## 2025-05-28 RX ADMIN — Medication 1 APPLICATION(S): at 05:07

## 2025-05-28 RX ADMIN — Medication 5 MILLIGRAM(S): at 22:03

## 2025-05-28 RX ADMIN — Medication 2000 UNIT(S): at 22:03

## 2025-05-28 RX ADMIN — FUROSEMIDE 40 MILLIGRAM(S): 10 INJECTION INTRAMUSCULAR; INTRAVENOUS at 05:06

## 2025-05-28 RX ADMIN — Medication 975 MILLIGRAM(S): at 11:46

## 2025-05-28 RX ADMIN — Medication 40 MILLIEQUIVALENT(S): at 11:49

## 2025-05-28 RX ADMIN — Medication 37.5 MICROGRAM(S): at 05:05

## 2025-05-28 RX ADMIN — Medication 650 MILLIGRAM(S): at 22:03

## 2025-05-28 RX ADMIN — Medication 160 MILLIGRAM(S): at 11:44

## 2025-05-28 RX ADMIN — Medication 3 MILLIGRAM(S): at 22:04

## 2025-05-28 RX ADMIN — Medication 81 MILLIGRAM(S): at 11:46

## 2025-05-28 NOTE — DISCHARGE NOTE PROVIDER - ATTENDING DISCHARGE PHYSICAL EXAMINATION:
GEN Lying in no acute distress  HEENT Pupils equal and reactive to light and accommodationSupple Neck  PULM Clear to auscultation bilaterally  CV s1s2   GI + bowel sounds nontnender  EXT no cyanosis or edema  PSYCH awake alert and oriented x 3  INTEG No Lesions  NEURO Moves all extremities

## 2025-05-28 NOTE — DISCHARGE NOTE PROVIDER - CARE PROVIDER_API CALL
Monse Tamayo  Internal Medicine  [address] [place] [address]  Phone: [phone]  Fax: [phone]  Follow Up Time: 1-3 days

## 2025-05-28 NOTE — DISCHARGE NOTE PROVIDER - HOSPITAL COURSE
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 She denies any known trauma to the site. Initially she thought the bleeding was from within her mouth or internal, later realized that it is coming from her right upper outer lip. It is a slow oozing, no profuse bleeding. Denies headache, dizziness, hemoptysis, hematemesis, melena, hematochezia, hematuria. Reports a bruise on L forearm that started prior to arriving at hospital, now somewhat increased in diameter. Denies CP. ROS positive for chronic dyspnea, unchanged from baseline.    Triage VS: /63, HR 85, T 98.1F, SpO2 99% on RA.  Labs: CBC stable (with chronic leukocytosis), PT > 40, INR 15.44, aPTT 95.6, creat 1 (baseline 0.6), LFTs unremarkable.  CTH non con: No acute intracranial pathology. No evidence of midline shift, mass effect or intracranial hemorrhage.    ED interventions: 5mg PO phytonadione.    Hospital Course    #R upper lip bleeding (Resolved)  #Nosebleed (Resolved)  #L anterior forearm bruise/ Likely hematoma (Improving)  #Supratherapeutic INR  with minimal bleeding (Resolved)  - In ED, CBC stable (with chronic leukocytosis), PT > 40, INR 15.44, aPTT 95.6, creat 1 (baseline 0.6), LFTs unremarkable.  - CTH non con: No acute intracranial pathology. No evidence of midline shift, mass effect or intracranial hemorrhage.  - ED interventions: 5mg PO phytonadione.  - Warfarin dose was doubled from 2.5mg QD to 5mg QD in April 2025. --> Per daughter, Pt was taking 10 mg by mistake afterwards. She took 10 (2 Pills of 5)  for few days before admission.  - Monitor INR closely.  - Q8H neuro checks to detect signs concerning for ICH.  - Monitor CBC, maintain active T&S,   - Keep 2 large IV access  - Transfuse to keep Hgb >8  - If INR becomes subtherapeutic, will start lovenox   - INR is at goal for now, will continue with warfarin 5 daily with close INR monitoring.    #Rheumatic MV disease  #Severe MR s/p bioprosthetic MV replacement  #Severe TR s/p TV annuloplasty on 12/13/2023  #CHB s/p Micra opn 12/17  #HTN  #Afib, on warfarin  #Acute Kidney Injury (Resolved)  - Restart warfarin.  - Restart valsartan  - Restart furosemide  - Restart ASA 81mg QD today  - Hold HCTZ, if BP is elevated restart  - Continue amlodipine 5 mg QD and metoprolol succinate 50mg QD.  - monitor electrolytes   - strickt I/Os  - avoid nephrotoxins   - trend creatinine      #Chronic leukocytosis  #Monocytosis  - Noted elevated WBC count with monocytosis chronically.  - Discussed possible etiologies including hematological malignancy with patient and daughter Purvi.  - She has not seen a hematologist in the past.  - They prefer to discuss amongst the family whether they want to pursue workup for this or not. For now they request to hold off on workup for this as inpatient.    #Hypothyroidism  - Continue levothyroxine 37.5mcg QD.     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 She denies any known trauma to the site. Initially she thought the bleeding was from within her mouth or internal, later realized that it is coming from her right upper outer lip. It is a slow oozing, no profuse bleeding. Denies headache, dizziness, hemoptysis, hematemesis, melena, hematochezia, hematuria. Reports a bruise on L forearm that started prior to arriving at hospital, now somewhat increased in diameter. Denies CP. ROS positive for chronic dyspnea, unchanged from baseline.    Triage VS: /63, HR 85, T 98.1F, SpO2 99% on RA.  Labs: CBC stable (with chronic leukocytosis), PT > 40, INR 15.44, aPTT 95.6, creat 1 (baseline 0.6), LFTs unremarkable.  CTH non con: No acute intracranial pathology. No evidence of midline shift, mass effect or intracranial hemorrhage.    ED interventions: 5mg PO phytonadione.    Patient was given multiple doses of warfarin INR trended closely. Patient is very sensitive to warfarin. INR 5/30 was 5.02, Warfarin was held, and INR still 4.99. Patient to be discharged, holding warfarin until monday. Patient aware not to take medication and follow up on monday with her doctor. Plan was also explained to patient's daughter. 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 She denies any known trauma to the site. Initially she thought the bleeding was from within her mouth or internal, later realized that it is coming from her right upper outer lip. It is a slow oozing, no profuse bleeding. Denies headache, dizziness, hemoptysis, hematemesis, melena, hematochezia, hematuria. Reports a bruise on L forearm that started prior to arriving at hospital, now somewhat increased in diameter. Denies CP. ROS positive for chronic dyspnea, unchanged from baseline.    Triage VS: /63, HR 85, T 98.1F, SpO2 99% on RA.  Labs: CBC stable (with chronic leukocytosis), PT > 40, INR 15.44, aPTT 95.6, creat 1 (baseline 0.6), LFTs unremarkable.  CTH non con: No acute intracranial pathology. No evidence of midline shift, mass effect or intracranial hemorrhage.    ED interventions: 5mg PO phytonadione.    Patient was given multiple doses of warfarin INR trended closely. Patient is very sensitive to warfarin. INR 5/30 was 5.02, Warfarin was held, and INR still 4.99. Patient to be discharged, holding warfarin until monday. Patient aware not to take medication and follow up on monday with her doctor. Plan was also explained to patient's daughter.     Called Dr Tamayo's office and answering service sent message , patient to follow up  with PMD monday .

## 2025-05-28 NOTE — PROGRESS NOTE ADULT - ASSESSMENT
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    #R upper lip bleeding (Resolved) L anterior forearm bruise/ Likely hematoma secondary to Supratherapeutic INR  with minimal bleeding (Resolved)  PT/INR - ( 28 May 2025 07:17 )   PT: 28.20 sec;   INR: 2.35 ratio    resume coumadin  5mg     #Rheumatic MV disease    #Drug monitoring of high risk medication , potential for drug drug reaction , requiring close monitoring of coumadin / high risk medication for bleeding     #Severe MR s/p bioprosthetic MV replacement    #Severe TR s/p TV annuloplasty on 12/13/2023    #CHB s/p Micra opn 12/17    #HTN    #Afib, on warfarin    #Acute Kidney Injury (Resolved)    #Hypokalemia replete     #Right arm pain at site of iv duplex ordered     #Chronic leukocytosis    #Monocytosis    #Hypothyroidism   levothyroxine 37.5mcg QD.    #Hyponatremia no intervention     PROGRESS NOTE HANDOFF    Pending: duplex , inr monitoring , dc in am     Family discussion: patient verbalized understanding and agreeable to plan of care     Disposition: Home

## 2025-05-28 NOTE — DISCHARGE NOTE PROVIDER - NSDCCPCAREPLAN_GEN_ALL_CORE_FT
PRINCIPAL DISCHARGE DIAGNOSIS  Diagnosis: Elevated INR  Assessment and Plan of Treatment: You were admitted because your INR level, which is a measure of blood-thinning, was too high, putting you at risk for bleeding. Ensure to regularly see your primary physician to assess your INR level so that your warfarin dose can be monitored. While on warfarin, avoid eating large amounts spinach, kale, brussel sprouts, and cranberry juice, but ensure you are eating a blanced, healthful diet. Talk to your doctor before starting any new medications as they may interact with warfarin and increase chance of bleeding. Should you notice any abnormal bruising or bleeding, pleasae either call your primary physician or visit the Emergency Room.

## 2025-05-28 NOTE — PHARMACOTHERAPY INTERVENTION NOTE - COMMENTS
84yFemale    Indication: Afib, bioprosthetic MV  INR Goal: 2-3 Confirmed with Dr. Reyes  Home Dose: Warfarin 5mg PO daily  Bridge Therapy:    Current Medications:  acetaminophen     Tablet .. 650 milliGRAM(s) Oral every 6 hours PRN  amLODIPine   Tablet 5 milliGRAM(s) Oral at bedtime  aspirin enteric coated 81 milliGRAM(s) Oral daily  chlorhexidine 2% Cloths 1 Application(s) Topical <User Schedule>  cholecalciferol 2000 Unit(s) Oral every 24 hours  furosemide    Tablet 40 milliGRAM(s) Oral daily  levothyroxine 37.5 MICROGram(s) Oral daily  melatonin 3 milliGRAM(s) Oral at bedtime PRN  metoprolol succinate ER 50 milliGRAM(s) Oral every 24 hours  valsartan 160 milliGRAM(s) Oral daily  warfarin 5 milliGRAM(s) Oral once      hemoglobin 9.6 g/dL (05-28-25 @ 07:17)    hematocrit 29.0 % (05-28-25 @ 07:17)    PLT: 221 K/uL (05-28-25 @ 07:17)    GFR:50 mL/min/1.73m2 (05-28-25 @ 07:17)      Drug Interactions:     INR trend  15.44 ratio (05-26-25 @ 04:10)  15.52 ratio (05-26-25 @ 11:54)  9.14 ratio (05-26-25 @ 15:49)  2.38 ratio (05-27-25 @ 07:53)  2.35 ratio (05-28-25 @ 07:17)      Warfarin administration history:  warfarin: 4 milliGRAM(s) (05-27-25 @ 21:56)        1. INR today is:               [   ] below goal ----- This is likely due to                                            [ x  ] at goal                                            [   ] above goal ------ This is likely due to        2. Discuss with provider(s) listed                                            [     ] to consider  Warfarin           PO x 1                                            [    x ] provider recommended warfarin        5mg         PO x 1                                           [     ] provider will order warfarin                     PO x 1     per [facility] pharmacotherapy intervention note: Patient presented with supratherapeutic INR of 15.44 on 05/26. Per MD, patient recently had a dose increase for warfarin from warfarin 2.5mg PO daily to 5mg PO daily, but by mistake patient had been taking Warfarin 10mg at home for few days.     patient received 4 mg dose yesterday with the plan of going back to patient's home dose 5 mg daily.     3. Obtain INR tomorrow AM

## 2025-05-28 NOTE — DISCHARGE NOTE PROVIDER - NSDCMRMEDTOKEN_GEN_ALL_CORE_FT
amLODIPine 5 mg oral tablet: 1 tab(s) orally once a day  aspirin 81 mg oral tablet: 1 tab(s) orally once a day  D3 50 mcg (2000 intl units) oral capsule: 1 cap(s) orally once a day  Lasix 40 mg oral tablet: 1 tab(s) orally once a day  levothyroxine 25 mcg (0.025 mg) oral tablet: 1.5 tab(s) orally once a day  Metoprolol Succinate ER 50 mg oral tablet, extended release: 1 tab(s) orally once a day  valsartan-hydrochlorothiazide 160 mg-25 mg oral tablet: 1 tab(s) orally once a day  warfarin 5 mg oral tablet: 1 tab(s) orally once a day   amLODIPine 5 mg oral tablet: 1 tab(s) orally once a day  aspirin 81 mg oral tablet: 1 tab(s) orally once a day  D3 50 mcg (2000 intl units) oral capsule: 1 cap(s) orally once a day  Lasix 40 mg oral tablet: 1 tab(s) orally once a day  levothyroxine 25 mcg (0.025 mg) oral tablet: 1.5 tab(s) orally once a day  Metoprolol Succinate ER 50 mg oral tablet, extended release: 1 tab(s) orally once a day  valsartan-hydrochlorothiazide 160 mg-25 mg oral tablet: 1 tab(s) orally once a day

## 2025-05-29 LAB
ANION GAP SERPL CALC-SCNC: 12 MMOL/L — SIGNIFICANT CHANGE UP (ref 7–14)
APTT BLD: 42.5 SEC — HIGH (ref 27–39.2)
APTT BLD: 45.8 SEC — HIGH (ref 27–39.2)
BASOPHILS # BLD AUTO: 0.02 K/UL — SIGNIFICANT CHANGE UP (ref 0–0.2)
BASOPHILS NFR BLD AUTO: 0.2 % — SIGNIFICANT CHANGE UP (ref 0–1)
BUN SERPL-MCNC: 37 MG/DL — HIGH (ref 10–20)
CALCIUM SERPL-MCNC: 9.3 MG/DL — SIGNIFICANT CHANGE UP (ref 8.4–10.5)
CHLORIDE SERPL-SCNC: 95 MMOL/L — LOW (ref 98–110)
CO2 SERPL-SCNC: 30 MMOL/L — SIGNIFICANT CHANGE UP (ref 17–32)
CREAT SERPL-MCNC: 1 MG/DL — SIGNIFICANT CHANGE UP (ref 0.7–1.5)
EGFR: 56 ML/MIN/1.73M2 — LOW
EGFR: 56 ML/MIN/1.73M2 — LOW
EOSINOPHIL # BLD AUTO: 0.24 K/UL — SIGNIFICANT CHANGE UP (ref 0–0.7)
EOSINOPHIL NFR BLD AUTO: 2.5 % — SIGNIFICANT CHANGE UP (ref 0–8)
GLUCOSE SERPL-MCNC: 117 MG/DL — HIGH (ref 70–99)
HCT VFR BLD CALC: 29.8 % — LOW (ref 37–47)
HGB BLD-MCNC: 9.8 G/DL — LOW (ref 12–16)
IMM GRANULOCYTES NFR BLD AUTO: 0.4 % — HIGH (ref 0.1–0.3)
INR BLD: 3.64 RATIO — HIGH (ref 0.65–1.3)
INR BLD: 4.61 RATIO — HIGH (ref 0.65–1.3)
LYMPHOCYTES # BLD AUTO: 1.92 K/UL — SIGNIFICANT CHANGE UP (ref 1.2–3.4)
LYMPHOCYTES # BLD AUTO: 19.7 % — LOW (ref 20.5–51.1)
MCHC RBC-ENTMCNC: 29.3 PG — SIGNIFICANT CHANGE UP (ref 27–31)
MCHC RBC-ENTMCNC: 32.9 G/DL — SIGNIFICANT CHANGE UP (ref 32–37)
MCV RBC AUTO: 89.2 FL — SIGNIFICANT CHANGE UP (ref 81–99)
MONOCYTES # BLD AUTO: 1.06 K/UL — HIGH (ref 0.1–0.6)
MONOCYTES NFR BLD AUTO: 10.9 % — HIGH (ref 1.7–9.3)
NEUTROPHILS # BLD AUTO: 6.45 K/UL — SIGNIFICANT CHANGE UP (ref 1.4–6.5)
NEUTROPHILS NFR BLD AUTO: 66.3 % — SIGNIFICANT CHANGE UP (ref 42.2–75.2)
NRBC BLD AUTO-RTO: 0 /100 WBCS — SIGNIFICANT CHANGE UP (ref 0–0)
PLATELET # BLD AUTO: 243 K/UL — SIGNIFICANT CHANGE UP (ref 130–400)
PMV BLD: 11.2 FL — HIGH (ref 7.4–10.4)
POTASSIUM SERPL-MCNC: 3.6 MMOL/L — SIGNIFICANT CHANGE UP (ref 3.5–5)
POTASSIUM SERPL-SCNC: 3.6 MMOL/L — SIGNIFICANT CHANGE UP (ref 3.5–5)
PROTHROM AB SERPL-ACNC: >40 SEC — HIGH (ref 9.95–12.87)
PROTHROM AB SERPL-ACNC: >40 SEC — HIGH (ref 9.95–12.87)
RBC # BLD: 3.34 M/UL — LOW (ref 4.2–5.4)
RBC # FLD: 13.6 % — SIGNIFICANT CHANGE UP (ref 11.5–14.5)
SODIUM SERPL-SCNC: 137 MMOL/L — SIGNIFICANT CHANGE UP (ref 135–146)
WBC # BLD: 9.73 K/UL — SIGNIFICANT CHANGE UP (ref 4.8–10.8)
WBC # FLD AUTO: 9.73 K/UL — SIGNIFICANT CHANGE UP (ref 4.8–10.8)

## 2025-05-29 PROCEDURE — 99233 SBSQ HOSP IP/OBS HIGH 50: CPT

## 2025-05-29 RX ADMIN — Medication 37.5 MICROGRAM(S): at 05:34

## 2025-05-29 RX ADMIN — Medication 1 APPLICATION(S): at 05:37

## 2025-05-29 RX ADMIN — Medication 650 MILLIGRAM(S): at 02:07

## 2025-05-29 RX ADMIN — METOPROLOL SUCCINATE 50 MILLIGRAM(S): 50 TABLET, EXTENDED RELEASE ORAL at 11:44

## 2025-05-29 RX ADMIN — FUROSEMIDE 40 MILLIGRAM(S): 10 INJECTION INTRAMUSCULAR; INTRAVENOUS at 05:34

## 2025-05-29 RX ADMIN — Medication 2000 UNIT(S): at 21:10

## 2025-05-29 RX ADMIN — Medication 81 MILLIGRAM(S): at 11:44

## 2025-05-29 RX ADMIN — Medication 3 MILLIGRAM(S): at 21:10

## 2025-05-29 NOTE — PROGRESS NOTE ADULT - ASSESSMENT
#R upper lip bleeding (Resolved)  #Nosebleed (Resolved)  #L anterior forearm bruise/ Likely hematoma (Improving)  #Supratherapeutic INR  with minimal bleeding  - In ED, CBC stable (with chronic leukocytosis), PT > 40, INR 15.44, aPTT 95.6, creat 1 (baseline 0.6), LFTs unremarkable.  - CTH non con: No acute intracranial pathology. No evidence of midline shift, mass effect or intracranial hemorrhage.  - ED interventions: 5mg PO phytonadione.  - Warfarin dose was doubled from 2.5mg QD to 5mg QD in April 2025. --> Per daughter, Pt was taking 10 mg by mistake afterwards. She took 10 (2 Pills of 5)  for few days before admission.  - Monitor INR closely.  - Q8H neuro checks to detect signs concerning for ICH.  - Monitor CBC, maintain active T&S,   - Keep 2 large IV access  - Transfuse to keep Hgb >8  - If INR becomes subtherapeutic, will start lovenox   - INR Supratherapeutic today, Will repeat INR in PM and dose warfarin accordingly.      #Rheumatic MV disease  #Severe MR s/p bioprosthetic MV replacement  #Severe TR s/p TV annuloplasty on 12/13/2023  #CHB s/p Micra opn 12/17  #HTN  #Afib, on warfarin  #Acute Kidney Injury (Resolved)  - Restart warfarin.  - Restart valsartan  - Restart furosemide  - Restart ASA 81mg QD today  - Hold HCTZ, if BP is elevated restart  - Continue amlodipine 5 mg QD and metoprolol succinate 50mg QD.  - monitor electrolytes   - strickt I/Os  - avoid nephrotoxins   - trend creatinine      #Chronic leukocytosis  #Monocytosis  - Noted elevated WBC count with monocytosis chronically.  - Discussed possible etiologies including hematological malignancy with patient and daughter Puriv.  - She has not seen a hematologist in the past.  - They prefer to discuss amongst the family whether they want to pursue workup for this or not. For now they request to hold off on workup for this as inpatient.    #Hypothyroidism  - Continue levothyroxine 37.5mcg QD.

## 2025-05-29 NOTE — PROGRESS NOTE ADULT - ASSESSMENT
85 yo F w PMH of AF on warfarin, rheumatic MV disease, severe MR and TR s/p bioprosthetic MV replacement and TV annuloplasty on 12/13/2023 at [facility], c/b CHB s/p Micra opn 12/17, hypothyroidism, presented to the ER for lip bleeding that started around 2 am on 5/26.    #R upper lip bleeding (Resolved) L anterior forearm bruise/ Likely hematoma secondary to Supratherapeutic INR  with minimal bleeding (Resolved)  PT/INR - ( 29 May 2025 07:20 )   PT: >40.00 sec;   INR: 3.64 ratio    repeat INR today , if remains stable , dc planning     #Rheumatic MV disease    #Drug monitoring of high risk medication , potential for drug drug reaction , requiring close monitoring of coumadin / high risk medication for bleeding     #Severe MR s/p bioprosthetic MV replacement    #Severe TR s/p TV annuloplasty on 12/13/2023    #CHB s/p Micra opn 12/17    #HTN    #Afib, on warfarin    #Acute Kidney Injury (Resolved)    #Hypokalemia resolved     #Right arm pain at site of iv - duplex negative for dvt     #Chronic leukocytosis    #Monocytosis    #Hypothyroidism   levothyroxine 37.5mcg QD.    PROGRESS NOTE HANDOFF    Pending: IRN monitoring , dc within 24 hours     Family discussion: patient verbalized understanding and agreeable to plan of care     Disposition: Home

## 2025-05-30 LAB
ANION GAP SERPL CALC-SCNC: 11 MMOL/L — SIGNIFICANT CHANGE UP (ref 7–14)
APTT BLD: 50.5 SEC — HIGH (ref 27–39.2)
BASOPHILS # BLD AUTO: 0.03 K/UL — SIGNIFICANT CHANGE UP (ref 0–0.2)
BASOPHILS NFR BLD AUTO: 0.3 % — SIGNIFICANT CHANGE UP (ref 0–1)
BUN SERPL-MCNC: 37 MG/DL — HIGH (ref 10–20)
CALCIUM SERPL-MCNC: 8.9 MG/DL — SIGNIFICANT CHANGE UP (ref 8.4–10.5)
CHLORIDE SERPL-SCNC: 97 MMOL/L — LOW (ref 98–110)
CO2 SERPL-SCNC: 29 MMOL/L — SIGNIFICANT CHANGE UP (ref 17–32)
CREAT SERPL-MCNC: 1 MG/DL — SIGNIFICANT CHANGE UP (ref 0.7–1.5)
EGFR: 56 ML/MIN/1.73M2 — LOW
EGFR: 56 ML/MIN/1.73M2 — LOW
EOSINOPHIL # BLD AUTO: 0.29 K/UL — SIGNIFICANT CHANGE UP (ref 0–0.7)
EOSINOPHIL NFR BLD AUTO: 2.8 % — SIGNIFICANT CHANGE UP (ref 0–8)
GLUCOSE SERPL-MCNC: 116 MG/DL — HIGH (ref 70–99)
HCT VFR BLD CALC: 27.4 % — LOW (ref 37–47)
HGB BLD-MCNC: 9.1 G/DL — LOW (ref 12–16)
IMM GRANULOCYTES NFR BLD AUTO: 0.2 % — SIGNIFICANT CHANGE UP (ref 0.1–0.3)
INR BLD: 5.02 RATIO — CRITICAL HIGH (ref 0.65–1.3)
LYMPHOCYTES # BLD AUTO: 1.86 K/UL — SIGNIFICANT CHANGE UP (ref 1.2–3.4)
LYMPHOCYTES # BLD AUTO: 17.9 % — LOW (ref 20.5–51.1)
MCHC RBC-ENTMCNC: 29.4 PG — SIGNIFICANT CHANGE UP (ref 27–31)
MCHC RBC-ENTMCNC: 33.2 G/DL — SIGNIFICANT CHANGE UP (ref 32–37)
MCV RBC AUTO: 88.7 FL — SIGNIFICANT CHANGE UP (ref 81–99)
MONOCYTES # BLD AUTO: 1.22 K/UL — HIGH (ref 0.1–0.6)
MONOCYTES NFR BLD AUTO: 11.8 % — HIGH (ref 1.7–9.3)
NEUTROPHILS # BLD AUTO: 6.95 K/UL — HIGH (ref 1.4–6.5)
NEUTROPHILS NFR BLD AUTO: 67 % — SIGNIFICANT CHANGE UP (ref 42.2–75.2)
NRBC BLD AUTO-RTO: 0 /100 WBCS — SIGNIFICANT CHANGE UP (ref 0–0)
PLATELET # BLD AUTO: 243 K/UL — SIGNIFICANT CHANGE UP (ref 130–400)
PMV BLD: 11.2 FL — HIGH (ref 7.4–10.4)
POTASSIUM SERPL-MCNC: 3.3 MMOL/L — LOW (ref 3.5–5)
POTASSIUM SERPL-SCNC: 3.3 MMOL/L — LOW (ref 3.5–5)
PROTHROM AB SERPL-ACNC: >40 SEC — HIGH (ref 9.95–12.87)
RBC # BLD: 3.09 M/UL — LOW (ref 4.2–5.4)
RBC # FLD: 13.6 % — SIGNIFICANT CHANGE UP (ref 11.5–14.5)
SODIUM SERPL-SCNC: 137 MMOL/L — SIGNIFICANT CHANGE UP (ref 135–146)
WBC # BLD: 10.37 K/UL — SIGNIFICANT CHANGE UP (ref 4.8–10.8)
WBC # FLD AUTO: 10.37 K/UL — SIGNIFICANT CHANGE UP (ref 4.8–10.8)

## 2025-05-30 RX ADMIN — FUROSEMIDE 40 MILLIGRAM(S): 10 INJECTION INTRAMUSCULAR; INTRAVENOUS at 05:22

## 2025-05-30 RX ADMIN — Medication 3 MILLIGRAM(S): at 21:17

## 2025-05-30 RX ADMIN — Medication 1 APPLICATION(S): at 05:23

## 2025-05-30 RX ADMIN — Medication 40 MILLIEQUIVALENT(S): at 16:19

## 2025-05-30 RX ADMIN — Medication 2000 UNIT(S): at 21:17

## 2025-05-30 RX ADMIN — Medication 650 MILLIGRAM(S): at 17:48

## 2025-05-30 RX ADMIN — Medication 81 MILLIGRAM(S): at 11:36

## 2025-05-30 RX ADMIN — Medication 37.5 MICROGRAM(S): at 05:23

## 2025-05-30 RX ADMIN — Medication 160 MILLIGRAM(S): at 05:22

## 2025-05-30 NOTE — PROGRESS NOTE ADULT - ASSESSMENT
83 yo F w PMH of AF on warfarin, rheumatic MV disease, severe MR and TR s/p bioprosthetic MV replacement and TV annuloplasty on 12/13/2023 at [facility], c/b CHB s/p Micra opn 12/17, hypothyroidism, presented to the ER for lip bleeding that started around 2 am on 5/26.    #R upper lip bleeding (Resolved) L anterior forearm bruise/ Likely hematoma secondary to Supratherapeutic INR  with minimal bleeding (Resolved)  PT/INR - ( 30 May 2025 07:26 )   PT: >40.00 sec;   INR: 5.02 ratio    INR trending up , hold coumadin tonight     #Rheumatic MV disease    #Drug monitoring of high risk medication , potential for drug drug reaction , requiring close monitoring of coumadin / high risk medication for bleeding     #Severe MR s/p bioprosthetic MV replacement    #Severe TR s/p TV annuloplasty on 12/13/2023    #CHB s/p Micra opn 12/17    #HTN    #Afib, on warfarin    #Acute Kidney Injury (Resolved)    #Hypokalemia replete     #Right arm pain at site of iv - duplex negative for dvt     #leukocytosis resolved     #Monocytosis    #Hypothyroidism   levothyroxine 37.5mcg QD.    PROGRESS NOTE HANDOFF    Pending: IRN monitoring      Family discussion: patient verbalized understanding and agreeable to plan of care , spoke with Brendan [phone]     Disposition: Home

## 2025-05-30 NOTE — PROGRESS NOTE ADULT - ASSESSMENT
83 yo F w PMH of AF on warfarin, rheumatic MV disease, severe MR and TR s/p bioprosthetic MV replacement and TV annuloplasty on 12/13/2023 at [facility], c/b CHB s/p Micra opn 12/17, hypothyroidism, presented to the ER for lip bleeding that started around 2 am on 5/26.    #R upper lip bleeding (Resolved)  #Nosebleed (Resolved)  #L anterior forearm bruise/ Likely hematoma (Improving)  #Supratherapeutic INR  with minimal bleeding  - In ED, CBC stable (with chronic leukocytosis), PT > 40, INR 15.44, aPTT 95.6, creat 1 (baseline 0.6), LFTs unremarkable.  - CTH non con: No acute intracranial pathology. No evidence of midline shift, mass effect or intracranial hemorrhage.  - ED interventions: 5mg PO phytonadione.  - Warfarin dose was doubled from 2.5mg QD to 5mg QD in April 2025. --> Per daughter, Pt was taking 10 mg by mistake afterwards. She took 10 (2 Pills of 5)  for few days before admission.  - Monitor INR closely.  - Q8H neuro checks to detect signs concerning for ICH.  - Monitor CBC, maintain active T&S,   - Keep 2 large IV access  - Transfuse to keep Hgb >8  - If INR becomes subtherapeutic, will start lovenox   - INR Supratherapeutic today, Will hold warfarin.      #Rheumatic MV disease  #Severe MR s/p bioprosthetic MV replacement  #Severe TR s/p TV annuloplasty on 12/13/2023  #CHB s/p Micra opn 12/17  #HTN  #Afib, on warfarin  #Acute Kidney Injury (Resolved)  - Restart warfarin.  - Restart valsartan  - Restart furosemide  - Restart ASA 81mg QD today  - Hold HCTZ, if BP is elevated restart  - Continue amlodipine 5 mg QD and metoprolol succinate 50mg QD.  - monitor electrolytes   - strickt I/Os  - avoid nephrotoxins   - trend creatinine      #Chronic leukocytosis  #Monocytosis  - Noted elevated WBC count with monocytosis chronically.  - Discussed possible etiologies including hematological malignancy with patient and daughter Purvi.  - She has not seen a hematologist in the past.  - They prefer to discuss amongst the family whether they want to pursue workup for this or not. For now they request to hold off on workup for this as inpatient.    #Hypothyroidism  - Continue levothyroxine 37.5mcg QD.

## 2025-05-30 NOTE — PHARMACOTHERAPY INTERVENTION NOTE - COMMENTS
84yFemale    Indication: afib, bioprosthetic mitral valve  INR Goal: 2-3  Home Dose: 5mg po daily  Bridge Therapy:    Current Medications:  acetaminophen     Tablet .. 650 milliGRAM(s) Oral every 6 hours PRN  amLODIPine   Tablet 5 milliGRAM(s) Oral at bedtime  aspirin enteric coated 81 milliGRAM(s) Oral daily  chlorhexidine 2% Cloths 1 Application(s) Topical <User Schedule>  cholecalciferol 2000 Unit(s) Oral every 24 hours  furosemide    Tablet 40 milliGRAM(s) Oral daily  levothyroxine 37.5 MICROGram(s) Oral daily  melatonin 3 milliGRAM(s) Oral at bedtime PRN  metoprolol succinate ER 50 milliGRAM(s) Oral every 24 hours  potassium chloride    Tablet ER 20 milliEquivalent(s) Oral every 2 hours  potassium chloride   Powder 40 milliEquivalent(s) Oral once  valsartan 160 milliGRAM(s) Oral daily      hemoglobin 9.1 g/dL (05-30-25 @ 07:26)    hematocrit 27.4 % (05-30-25 @ 07:26)    PLT: 243 K/uL (05-30-25 @ 07:26)    GFR:56 mL/min/1.73m2 (05-30-25 @ 07:26)      Drug Interactions:     INR trend  15.44 ratio (05-26-25 @ 04:10)  15.52 ratio (05-26-25 @ 11:54)  9.14 ratio (05-26-25 @ 15:49)  2.38 ratio (05-27-25 @ 07:53)  2.35 ratio (05-28-25 @ 07:17)  3.64 ratio (05-29-25 @ 07:20)  4.61 ratio (05-29-25 @ 16:09)  5.02 ratio (05-30-25 @ 07:26)      Warfarin administration history:  warfarin: 4 milliGRAM(s) (05-27-25 @ 21:56)  warfarin: 5 milliGRAM(s) (05-28-25 @ 22:03)        1. INR today is:               [   ] below goal ----- This is likely due to                                            [   ] at goal                                            [ x] above goal ------ This is likely due to unknown reason. Patient is highly sensitive to warfarin. Home dose regimen might be too high for patient.         2. Discuss with provider(s) listed                                            [     ] to consider  Warfarin           PO x 1                                            [  x   ] provider recommended hold warfarin. Agree to hold warfarin. May need to reduce warfarin home dose once INR falls back within range                                           [     ] provider will order warfarin                     PO x 1     3. Obtain INR tomorrow AM

## 2025-05-30 NOTE — PROGRESS NOTE ADULT - SUBJECTIVE AND OBJECTIVE BOX
SORAYA KING  84y  [facility] 4B 022 A      Patient is a 84y old  Female who presents with a chief complaint of     My note supersedes the resident's note      INTERVAL HPI/OVERNIGHT EVENTS:      no acute events overnight   REVIEW OF SYSTEMS:  CONSTITUTIONAL: No fever, weight loss, or fatigue  EYES: No eye pain, visual disturbances, or discharge  ENMT:  No difficulty hearing, tinnitus, vertigo; No sinus or throat pain  NECK: No pain or stiffness  BREASTS: No pain, masses, or nipple discharge  RESPIRATORY: No cough, wheezing, chills or hemoptysis; No shortness of breath  CARDIOVASCULAR: No chest pain, palpitations, dizziness, or leg swelling  GASTROINTESTINAL: No abdominal or epigastric pain. No nausea, vomiting, or hematemesis; No diarrhea or constipation. No melena or hematochezia.  GENITOURINARY: No dysuria, frequency, hematuria, or incontinence  NEUROLOGICAL: No headaches, memory loss, loss of strength, numbness, or tremors  SKIN: No itching, burning, rashes, or lesions   LYMPH NODES: No enlarged glands  ENDOCRINE: No heat or cold intolerance; No hair loss  MUSCULOSKELETAL: R arm pain   PSYCHIATRIC: No depression, anxiety, mood swings, or difficulty sleeping  HEME/LYMPH: No easy bruising, or bleeding gums  ALLERY AND IMMUNOLOGIC: No hives or eczema  FAMILY HISTORY:    T(C): 36.8 (05-29-25 @ 08:32), Max: 36.8 (05-28-25 @ 23:58)  HR: 51 (05-29-25 @ 08:32) (51 - 60)  BP: 102/58 (05-29-25 @ 08:32) (97/46 - 106/62)  RR: 18 (05-29-25 @ 08:32) (18 - 18)  SpO2: 98% (05-29-25 @ 08:32) (97% - 99%)  Wt(kg): --Vital Signs Last 24 Hrs  T(C): 36.8 (29 May 2025 08:32), Max: 36.8 (28 May 2025 23:58)  T(F): 98.3 (29 May 2025 08:32), Max: 98.3 (29 May 2025 08:32)  HR: 51 (29 May 2025 08:32) (51 - 60)  BP: 102/58 (29 May 2025 08:32) (97/46 - 106/62)  BP(mean): --  RR: 18 (29 May 2025 08:32) (18 - 18)  SpO2: 98% (29 May 2025 08:32) (97% - 99%)    Parameters below as of 29 May 2025 08:32  Patient On (Oxygen Delivery Method): room air        PHYSICAL EXAM:  GENERAL: NAD,   HEAD:  Atraumatic, Normocephalic  EYES: EOMI, PERRLA, conjunctiva and sclera clear  ENMT: No tonsillar erythema, exudates, or enlargement; Moist mucous membranes, Good dentition, No lesions  NECK: Supple, No JVD, Normal thyroid  NERVOUS SYSTEM:  Alert & Oriented X3, Good concentration; Motor Strength 5/5 B/L upper and lower extremities; DTRs 2+ intact and symmetric  PULM: Clear to auscultation bilaterally  CARDIAC: s1s2  GI: Soft, Nontender, Nondistended; Bowel sounds present  EXTREMITIES:  2+ Peripheral Pulses, No clubbing, cyanosis, or edema  LYMPH: No lymphadenopathy noted  SKIN: No rashes or lesions    Consultant(s) Notes Reviewed:  [x ] YES  [ ] NO  Care Discussed with Consultants/Other Providers [ x] YES  [ ] NO    LABS:                            9.8    9.73  )-----------( 243      ( 29 May 2025 07:20 )             29.8   05-29    137  |  95[L]  |  37[H]  ----------------------------<  117[H]  3.6   |  30  |  1.0    Ca    9.3      29 May 2025 07:20              acetaminophen     Tablet .. 650 milliGRAM(s) Oral every 6 hours PRN  amLODIPine   Tablet 5 milliGRAM(s) Oral at bedtime  aspirin enteric coated 81 milliGRAM(s) Oral daily  chlorhexidine 2% Cloths 1 Application(s) Topical <User Schedule>  cholecalciferol 2000 Unit(s) Oral every 24 hours  furosemide    Tablet 40 milliGRAM(s) Oral daily  levothyroxine 37.5 MICROGram(s) Oral daily  melatonin 3 milliGRAM(s) Oral at bedtime PRN  metoprolol succinate ER 50 milliGRAM(s) Oral every 24 hours  potassium chloride    Tablet ER 20 milliEquivalent(s) Oral every 2 hours  valsartan 160 milliGRAM(s) Oral daily      HEALTH ISSUES - PROBLEM Dx:          Case Discussed with House Staff   Spectra [phone]  
  SORAYA KING  84y  [facility] 4B 022 A      Patient is a 84y old  Female who presents with a chief complaint of     My note supersedes the resident's note      INTERVAL HPI/OVERNIGHT EVENTS:    no acute events overnight     REVIEW OF SYSTEMS:  CONSTITUTIONAL: No fever, weight loss, or fatigue  EYES: No eye pain, visual disturbances, or discharge  ENMT:  No difficulty hearing, tinnitus, vertigo; No sinus or throat pain  NECK: No pain or stiffness  BREASTS: No pain, masses, or nipple discharge  RESPIRATORY: No cough, wheezing, chills or hemoptysis; No shortness of breath  CARDIOVASCULAR: No chest pain, palpitations, dizziness, or leg swelling  GASTROINTESTINAL: No abdominal or epigastric pain. No nausea, vomiting, or hematemesis; No diarrhea or constipation. No melena or hematochezia.  GENITOURINARY: No dysuria, frequency, hematuria, or incontinence  NEUROLOGICAL: No headaches, memory loss, loss of strength, numbness, or tremors  SKIN: No itching, burning, rashes, or lesions   LYMPH NODES: No enlarged glands  ENDOCRINE: No heat or cold intolerance; No hair loss  MUSCULOSKELETAL: No joint pain or swelling; No muscle, back, or extremity pain  PSYCHIATRIC: No depression, anxiety, mood swings, or difficulty sleeping  HEME/LYMPH: No easy bruising, or bleeding gums  ALLERY AND IMMUNOLOGIC: No hives or eczema  FAMILY HISTORY:    T(C): 36.9 (05-28-25 @ 08:07), Max: 36.9 (05-28-25 @ 08:07)  HR: 60 (05-28-25 @ 08:07) (60 - 65)  BP: 114/67 (05-28-25 @ 08:07) (103/58 - 114/67)  RR: 18 (05-28-25 @ 08:07) (18 - 18)  SpO2: 93% (05-28-25 @ 08:07) (93% - 95%)  Wt(kg): --Vital Signs Last 24 Hrs  T(C): 36.9 (28 May 2025 08:07), Max: 36.9 (28 May 2025 08:07)  T(F): 98.4 (28 May 2025 08:07), Max: 98.4 (28 May 2025 08:07)  HR: 60 (28 May 2025 08:07) (60 - 65)  BP: 114/67 (28 May 2025 08:07) (103/58 - 114/67)  BP(mean): 78 (28 May 2025 05:00) (73 - 78)  RR: 18 (28 May 2025 08:07) (18 - 18)  SpO2: 93% (28 May 2025 08:07) (93% - 95%)    Parameters below as of 28 May 2025 08:07  Patient On (Oxygen Delivery Method): room air        PHYSICAL EXAM:  GENERAL: NAD,   HEAD:  Atraumatic, Normocephalic  EYES: EOMI, PERRLA, conjunctiva and sclera clear  ENMT: No tonsillar erythema, exudates, or enlargement; Moist mucous membranes, Good dentition, No lesions  NECK: Supple, No JVD, Normal thyroid  NERVOUS SYSTEM:  Alert  PULM: Clear to auscultation bilaterally  CARDIAC: s1s2   GI: Soft, Nontender, Nondistended; Bowel sounds present  EXTREMITIES:  2+ Peripheral Pulses, No clubbing, cyanosis, or edema  LYMPH: No lymphadenopathy noted  SKIN: No rashes or lesions    Consultant(s) Notes Reviewed:  [x ] YES  [ ] NO  Care Discussed with Consultants/Other Providers [ x] YES  [ ] NO    LABS:                            9.6    15.06 )-----------( 221      ( 28 May 2025 07:17 )             29.0   05-28    134[L]  |  95[L]  |  34[H]  ----------------------------<  137[H]  3.2[L]   |  25  |  1.1    Ca    9.2      28 May 2025 07:17  Phos  2.7     05-27  Mg     2.1     05-27              acetaminophen     Tablet .. 650 milliGRAM(s) Oral every 6 hours PRN  amLODIPine   Tablet 5 milliGRAM(s) Oral at bedtime  aspirin enteric coated 81 milliGRAM(s) Oral daily  chlorhexidine 2% Cloths 1 Application(s) Topical <User Schedule>  cholecalciferol 2000 Unit(s) Oral every 24 hours  furosemide    Tablet 40 milliGRAM(s) Oral daily  levothyroxine 37.5 MICROGram(s) Oral daily  melatonin 3 milliGRAM(s) Oral at bedtime PRN  metoprolol succinate ER 50 milliGRAM(s) Oral every 24 hours  valsartan 160 milliGRAM(s) Oral daily  warfarin 5 milliGRAM(s) Oral once      HEALTH ISSUES - PROBLEM Dx:          Case Discussed with House Staff   50 minutes spent on total time on encounter , this excludes teaching   Spectra [phone]  
SUBJECTIVE/OVERNIGHT EVENTS  Today is hospital day 1d. This morning patient was seen and examined at bedside, resting comfortably in bed.     Pt had an episode of nosebleed overnight. No bleed this AM.    CODE STATUS:      PMH:  HTN (hypertension)    Afib    Hypothyroid          PSH:  FHx: spinal stenosis          MEDICATIONS  STANDING MEDICATIONS  amLODIPine   Tablet 5 milliGRAM(s) Oral at bedtime  chlorhexidine 2% Cloths 1 Application(s) Topical <User Schedule>  cholecalciferol 2000 Unit(s) Oral every 24 hours  levothyroxine 37.5 MICROGram(s) Oral daily  metoprolol succinate ER 50 milliGRAM(s) Oral every 24 hours  warfarin 4 milliGRAM(s) Oral once    PRN MEDICATIONS  acetaminophen     Tablet .. 650 milliGRAM(s) Oral every 6 hours PRN  melatonin 3 milliGRAM(s) Oral at bedtime PRN    VITALS  T(F): 98.9 (05-27-25 @ 07:11), Max: 98.9 (05-27-25 @ 07:11)  HR: 60 (05-27-25 @ 07:11) (57 - 60)  BP: 134/71 (05-27-25 @ 07:11) (103/63 - 138/66)  RR: 18 (05-27-25 @ 07:11) (18 - 18)  SpO2: 98% (05-27-25 @ 07:11) (92% - 98%)    PHYSICAL EXAM  GENERAL  ( x ) NAD, lying in bed comfortably     (  ) obtunded     (  ) lethargic     (  ) somnolent    HEAD  ( x ) Atraumatic     (  ) hematoma     (  ) laceration (specify location:       )     NECK  ( x ) Supple     (  ) neck stiffness     (  ) nuchal rigidity     (  )  no JVD     (  ) JVD present ( -- cm)    HEART  Rate -->  ( x ) normal rate    (  ) bradycardic    (  ) tachycardic  Rhythm -->  ( x ) regular    (  ) regularly irregular    (  ) irregularly irregular  Murmurs -->  ( x ) normal s1/s2    (  ) systolic murmur    (  ) diastolic murmur    (  ) continuous murmur     (  ) S3 present    (  ) S4 present    LUNGS  ( x )Unlabored respirations     (  ) tachypnea  ( x ) B/L air entry     (  ) decreased breath sounds in:  (location     )    ( x ) no adventitious sound     (  ) crackles     (  ) wheezing      (  ) rhonchi      (specify location:       )  (  ) chest wall tenderness (specify location:       )    ABDOMEN  ( x ) Soft     (  ) tense   |   ( x ) nondistended     (  ) distended   |   (  ) +BS     (  ) hypoactive bowel sounds     (  ) hyperactive bowel sounds  ( x ) nontender     (  ) RUQ tenderness     (  ) RLQ tenderness     (  ) LLQ tenderness     (  ) epigastric tenderness     (  ) diffuse tenderness  (  ) Splenomegaly      (  ) Hepatomegaly      (  ) Jaundice     (  ) ecchymosis     EXTREMITIES  (x  ) Normal     (  ) Rash     (  ) ecchymosis     (  ) varicose veins      (  ) pitting edema     (  ) non-pitting edema   (  ) ulceration     (  ) gangrene:     (location:     )    NERVOUS SYSTEM  ( x ) A&Ox3     (  ) confused     (  ) lethargic  CN II-XII:     (  ) Intact     (  ) focal deficits  (Specify:     )   Upper extremities:     (  ) strength X/5     (  ) focal deficit (specify:    )  Lower extremities:     (  ) strength  X/5    (  ) focal deficit (specify:    )      LABS             10.1   14.49 )-----------( 221      ( 05-27-25 @ 07:53 )             31.0     136  |  94  |  30  -------------------------<  100   05-27-25 @ 07:53  3.6  |  29  |  0.8    Ca      9.2     05-27-25 @ 07:53  Phos   2.7     05-27-25 @ 07:53  Mg     2.1     05-27-25 @ 07:53    TPro  6.9  /  Alb  4.2  /  TBili  0.7  /  DBili  0.2  /  AST  33  /  ALT  20  /  AlkPhos  77  /  GGT  x     05-26-25 @ 05:27    PT/INR - ( 05-27-25 @ 07:53 )   PT: 28.60 sec[H];   INR: 2.38 ratio[H]  PTT - ( 05-27-25 @ 07:53 )  PTT:38.7 sec      Urinalysis Basic - ( 27 May 2025 07:53 )    Color: x / Appearance: x / SG: x / pH: x  Gluc: 100 mg/dL / Ketone: x  / Bili: x / Urobili: x   Blood: x / Protein: x / Nitrite: x   Leuk Esterase: x / RBC: x / WBC x   Sq Epi: x / Non Sq Epi: x / Bacteria: x
SUBJECTIVE/OVERNIGHT EVENTS  Today is hospital day 4d. This morning patient was seen and examined at bedside, resting comfortably in bed. No acute or major events overnight.      CODE STATUS:      PMH:  HTN (hypertension)    Afib    Hypothyroid          PSH:  FHx: spinal stenosis          MEDICATIONS  STANDING MEDICATIONS  amLODIPine   Tablet 5 milliGRAM(s) Oral at bedtime  aspirin enteric coated 81 milliGRAM(s) Oral daily  chlorhexidine 2% Cloths 1 Application(s) Topical <User Schedule>  cholecalciferol 2000 Unit(s) Oral every 24 hours  furosemide    Tablet 40 milliGRAM(s) Oral daily  levothyroxine 37.5 MICROGram(s) Oral daily  metoprolol succinate ER 50 milliGRAM(s) Oral every 24 hours  potassium chloride    Tablet ER 20 milliEquivalent(s) Oral every 2 hours  potassium chloride   Powder 40 milliEquivalent(s) Oral once  valsartan 160 milliGRAM(s) Oral daily    PRN MEDICATIONS  acetaminophen     Tablet .. 650 milliGRAM(s) Oral every 6 hours PRN  melatonin 3 milliGRAM(s) Oral at bedtime PRN    VITALS  T(F): 98.4 (05-30-25 @ 07:55), Max: 98.6 (05-29-25 @ 16:50)  HR: 66 (05-30-25 @ 10:58) (60 - 78)  BP: 96/60 (05-30-25 @ 10:58) (96/60 - 113/73)  RR: 18 (05-30-25 @ 07:55) (18 - 18)  SpO2: 98% (05-30-25 @ 07:55) (97% - 98%)    PHYSICAL EXAM  GENERAL  ( x ) NAD, lying in bed comfortably     (  ) obtunded     (  ) lethargic     (  ) somnolent    HEAD  ( x ) Atraumatic     (  ) hematoma     (  ) laceration (specify location:       )     NECK  ( x ) Supple     (  ) neck stiffness     (  ) nuchal rigidity     (  )  no JVD     (  ) JVD present ( -- cm)    HEART  Rate -->  ( x ) normal rate    (  ) bradycardic    (  ) tachycardic  Rhythm -->  ( x ) regular    (  ) regularly irregular    (  ) irregularly irregular  Murmurs -->  ( x ) normal s1/s2    (  ) systolic murmur    (  ) diastolic murmur    (  ) continuous murmur     (  ) S3 present    (  ) S4 present    LUNGS  ( x )Unlabored respirations     (  ) tachypnea  ( x ) B/L air entry     (  ) decreased breath sounds in:  (location     )    ( x ) no adventitious sound     (  ) crackles     (  ) wheezing      (  ) rhonchi      (specify location:       )  (  ) chest wall tenderness (specify location:       )    ABDOMEN  ( x ) Soft     (  ) tense   |   ( x ) nondistended     (  ) distended   |   (  ) +BS     (  ) hypoactive bowel sounds     (  ) hyperactive bowel sounds  ( x ) nontender     (  ) RUQ tenderness     (  ) RLQ tenderness     (  ) LLQ tenderness     (  ) epigastric tenderness     (  ) diffuse tenderness  (  ) Splenomegaly      (  ) Hepatomegaly      (  ) Jaundice     (  ) ecchymosis     EXTREMITIES  (x  ) Normal     (  ) Rash     (  ) ecchymosis     (  ) varicose veins      (  ) pitting edema     (  ) non-pitting edema   (  ) ulceration     (  ) gangrene:     (location:     )    NERVOUS SYSTEM  ( x ) A&Ox3     (  ) confused     (  ) lethargic  CN II-XII:     (  ) Intact     (  ) focal deficits  (Specify:     )   Upper extremities:     (  ) strength X/5     (  ) focal deficit (specify:    )  Lower extremities:     (  ) strength  X/5    (  ) focal deficit (specify:    )      LABS             9.1    10.37 )-----------( 243      ( 05-30-25 @ 07:26 )             27.4     137  |  97  |  37  -------------------------<  116   05-30-25 @ 07:26  3.3  |  29  |  1.0    Ca      8.9     05-30-25 @ 07:26      PT/INR - ( 05-30-25 @ 07:26 )   PT: >40.00 sec[H];   INR: 5.02 ratio[HH]  PTT - ( 05-30-25 @ 07:26 )  PTT:50.5 sec      Urinalysis Basic - ( 30 May 2025 07:26 )    Color: x / Appearance: x / SG: x / pH: x  Gluc: 116 mg/dL / Ketone: x  / Bili: x / Urobili: x   Blood: x / Protein: x / Nitrite: x   Leuk Esterase: x / RBC: x / WBC x   Sq Epi: x / Non Sq Epi: x / Bacteria: x          IMAGING
SUBJECTIVE/OVERNIGHT EVENTS  Today is hospital day 3d. This morning patient was seen and examined at bedside, resting comfortably in bed. No acute or major events overnight.      CODE STATUS:      PMH:  HTN (hypertension)    Afib    Hypothyroid          PSH:  FHx: spinal stenosis          MEDICATIONS  STANDING MEDICATIONS  amLODIPine   Tablet 5 milliGRAM(s) Oral at bedtime  aspirin enteric coated 81 milliGRAM(s) Oral daily  chlorhexidine 2% Cloths 1 Application(s) Topical <User Schedule>  cholecalciferol 2000 Unit(s) Oral every 24 hours  furosemide    Tablet 40 milliGRAM(s) Oral daily  levothyroxine 37.5 MICROGram(s) Oral daily  metoprolol succinate ER 50 milliGRAM(s) Oral every 24 hours  potassium chloride    Tablet ER 20 milliEquivalent(s) Oral every 2 hours  valsartan 160 milliGRAM(s) Oral daily    PRN MEDICATIONS  acetaminophen     Tablet .. 650 milliGRAM(s) Oral every 6 hours PRN  melatonin 3 milliGRAM(s) Oral at bedtime PRN    VITALS  T(F): 98.3 (05-29-25 @ 08:32), Max: 98.3 (05-29-25 @ 08:32)  HR: 62 (05-29-25 @ 11:47) (51 - 62)  BP: 116/57 (05-29-25 @ 11:47) (97/46 - 116/57)  RR: 18 (05-29-25 @ 08:32) (18 - 18)  SpO2: 98% (05-29-25 @ 08:32) (97% - 99%)    PHYSICAL EXAM  GENERAL  ( x ) NAD, lying in bed comfortably     (  ) obtunded     (  ) lethargic     (  ) somnolent    HEAD  ( x ) Atraumatic     (  ) hematoma     (  ) laceration (specify location:       )     NECK  ( x ) Supple     (  ) neck stiffness     (  ) nuchal rigidity     (  )  no JVD     (  ) JVD present ( -- cm)    HEART  Rate -->  ( x ) normal rate    (  ) bradycardic    (  ) tachycardic  Rhythm -->  ( x ) regular    (  ) regularly irregular    (  ) irregularly irregular  Murmurs -->  ( x ) normal s1/s2    (  ) systolic murmur    (  ) diastolic murmur    (  ) continuous murmur     (  ) S3 present    (  ) S4 present    LUNGS  ( x )Unlabored respirations     (  ) tachypnea  ( x ) B/L air entry     (  ) decreased breath sounds in:  (location     )    ( x ) no adventitious sound     (  ) crackles     (  ) wheezing      (  ) rhonchi      (specify location:       )  (  ) chest wall tenderness (specify location:       )    ABDOMEN  ( x ) Soft     (  ) tense   |   ( x ) nondistended     (  ) distended   |   (  ) +BS     (  ) hypoactive bowel sounds     (  ) hyperactive bowel sounds  ( x ) nontender     (  ) RUQ tenderness     (  ) RLQ tenderness     (  ) LLQ tenderness     (  ) epigastric tenderness     (  ) diffuse tenderness  (  ) Splenomegaly      (  ) Hepatomegaly      (  ) Jaundice     (  ) ecchymosis     EXTREMITIES  (x  ) Normal     (  ) Rash     (  ) ecchymosis     (  ) varicose veins      (  ) pitting edema     (  ) non-pitting edema   (  ) ulceration     (  ) gangrene:     (location:     )    NERVOUS SYSTEM  ( x ) A&Ox3     (  ) confused     (  ) lethargic  CN II-XII:     (  ) Intact     (  ) focal deficits  (Specify:     )   Upper extremities:     (  ) strength X/5     (  ) focal deficit (specify:    )  Lower extremities:     (  ) strength  X/5    (  ) focal deficit (specify:    )      LABS             9.8    9.73  )-----------( 243      ( 05-29-25 @ 07:20 )             29.8     137  |  95  |  37  -------------------------<  117   05-29-25 @ 07:20  3.6  |  30  |  1.0    Ca      9.3     05-29-25 @ 07:20      PT/INR - ( 05-29-25 @ 07:20 )   PT: >40.00 sec[H];   INR: 3.64 ratio[H]  PTT - ( 05-29-25 @ 07:20 )  PTT:42.5 sec      Urinalysis Basic - ( 29 May 2025 07:20 )    Color: x / Appearance: x / SG: x / pH: x  Gluc: 117 mg/dL / Ketone: x  / Bili: x / Urobili: x   Blood: x / Protein: x / Nitrite: x   Leuk Esterase: x / RBC: x / WBC x   Sq Epi: x / Non Sq Epi: x / Bacteria: x          IMAGING
  SORAYA KING  84y  [facility]-N 4B 022 A      Patient is a 84y old  Female who presents with a chief complaint of     My note supersedes the resident's note      INTERVAL HPI/OVERNIGHT EVENTS:    no acute events overnight     REVIEW OF SYSTEMS:  CONSTITUTIONAL: No fever, weight loss, or fatigue  EYES: No eye pain, visual disturbances, or discharge  ENMT:  No difficulty hearing, tinnitus, vertigo; No sinus or throat pain  NECK: No pain or stiffness  BREASTS: No pain, masses, or nipple discharge  RESPIRATORY: No cough, wheezing, chills or hemoptysis; No shortness of breath  CARDIOVASCULAR: No chest pain, palpitations, dizziness, or leg swelling  GASTROINTESTINAL: No abdominal or epigastric pain. No nausea, vomiting, or hematemesis; No diarrhea or constipation. No melena or hematochezia.  GENITOURINARY: No dysuria, frequency, hematuria, or incontinence  NEUROLOGICAL: No headaches, memory loss, loss of strength, numbness, or tremors  SKIN: No itching, burning, rashes, or lesions   LYMPH NODES: No enlarged glands  ENDOCRINE: No heat or cold intolerance; No hair loss  MUSCULOSKELETAL: No joint pain or swelling; No muscle, back, or extremity pain  PSYCHIATRIC: No depression, anxiety, mood swings, or difficulty sleeping  HEME/LYMPH: No easy bruising, or bleeding gums  ALLERY AND IMMUNOLOGIC: No hives or eczema  FAMILY HISTORY:    T(C): 36.9 (05-30-25 @ 07:55), Max: 37 (05-29-25 @ 16:50)  HR: 66 (05-30-25 @ 10:58) (60 - 78)  BP: 96/60 (05-30-25 @ 10:58) (96/60 - 116/57)  RR: 18 (05-30-25 @ 07:55) (18 - 18)  SpO2: 98% (05-30-25 @ 07:55) (97% - 98%)  Wt(kg): --Vital Signs Last 24 Hrs  T(C): 36.9 (30 May 2025 07:55), Max: 37 (29 May 2025 16:50)  T(F): 98.4 (30 May 2025 07:55), Max: 98.6 (29 May 2025 16:50)  HR: 66 (30 May 2025 10:58) (60 - 78)  BP: 96/60 (30 May 2025 10:58) (96/60 - 116/57)  BP(mean): 99 (30 May 2025 07:55) (99 - 99)  RR: 18 (30 May 2025 07:55) (18 - 18)  SpO2: 98% (30 May 2025 07:55) (97% - 98%)    Parameters below as of 30 May 2025 07:55  Patient On (Oxygen Delivery Method): room air        PHYSICAL EXAM:  GENERAL: NAD,   HEAD:  Atraumatic, Normocephalic  EYES: EOMI, PERRLA, conjunctiva and sclera clear  ENMT: No tonsillar erythema, exudates, or enlargement; Moist mucous membranes, Good dentition, No lesions  NECK: Supple, No JVD, Normal thyroid  NERVOUS SYSTEM:  Alert & Oriented X3, Good concentration; Motor Strength 5/5 B/L upper and lower extremities; DTRs 2+ intact and symmetric  PULM: Clear to auscultation bilaterally  CARDIAC: Regular rate and rhythm; No murmurs, rubs, or gallops  GI: Soft, Nontender, Nondistended; Bowel sounds present  EXTREMITIES:  2+ Peripheral Pulses, No clubbing, cyanosis, or edema  LYMPH: No lymphadenopathy noted  SKIN: No rashes or lesions    Consultant(s) Notes Reviewed:  [x ] YES  [ ] NO  Care Discussed with Consultants/Other Providers [ x] YES  [ ] NO    LABS:                            9.1    10.37 )-----------( 243      ( 30 May 2025 07:26 )             27.4   05-30    137  |  97[L]  |  37[H]  ----------------------------<  116[H]  3.3[L]   |  29  |  1.0    Ca    8.9      30 May 2025 07:26              acetaminophen     Tablet .. 650 milliGRAM(s) Oral every 6 hours PRN  amLODIPine   Tablet 5 milliGRAM(s) Oral at bedtime  aspirin enteric coated 81 milliGRAM(s) Oral daily  chlorhexidine 2% Cloths 1 Application(s) Topical <User Schedule>  cholecalciferol 2000 Unit(s) Oral every 24 hours  furosemide    Tablet 40 milliGRAM(s) Oral daily  levothyroxine 37.5 MICROGram(s) Oral daily  melatonin 3 milliGRAM(s) Oral at bedtime PRN  metoprolol succinate ER 50 milliGRAM(s) Oral every 24 hours  potassium chloride    Tablet ER 20 milliEquivalent(s) Oral every 2 hours  valsartan 160 milliGRAM(s) Oral daily      HEALTH ISSUES - PROBLEM Dx:          Case Discussed with House Staff   50 minutes spent on total time on encounter , this excludes teaching , speaking to family , cm and sw   Spectra [phone]

## 2025-05-31 ENCOUNTER — TRANSCRIPTION ENCOUNTER (OUTPATIENT)
Age: 85
End: 2025-05-31

## 2025-05-31 VITALS
HEART RATE: 65 BPM | DIASTOLIC BLOOD PRESSURE: 74 MMHG | TEMPERATURE: 98 F | SYSTOLIC BLOOD PRESSURE: 124 MMHG | OXYGEN SATURATION: 97 % | RESPIRATION RATE: 18 BRPM

## 2025-05-31 LAB
ANION GAP SERPL CALC-SCNC: 12 MMOL/L — SIGNIFICANT CHANGE UP (ref 7–14)
APTT BLD: 47.9 SEC — HIGH (ref 27–39.2)
BASOPHILS # BLD AUTO: 0.03 K/UL — SIGNIFICANT CHANGE UP (ref 0–0.2)
BASOPHILS NFR BLD AUTO: 0.3 % — SIGNIFICANT CHANGE UP (ref 0–1)
BUN SERPL-MCNC: 30 MG/DL — HIGH (ref 10–20)
CALCIUM SERPL-MCNC: 8.7 MG/DL — SIGNIFICANT CHANGE UP (ref 8.4–10.5)
CHLORIDE SERPL-SCNC: 99 MMOL/L — SIGNIFICANT CHANGE UP (ref 98–110)
CO2 SERPL-SCNC: 28 MMOL/L — SIGNIFICANT CHANGE UP (ref 17–32)
CREAT SERPL-MCNC: 0.9 MG/DL — SIGNIFICANT CHANGE UP (ref 0.7–1.5)
EGFR: 63 ML/MIN/1.73M2 — SIGNIFICANT CHANGE UP
EGFR: 63 ML/MIN/1.73M2 — SIGNIFICANT CHANGE UP
EOSINOPHIL # BLD AUTO: 0.28 K/UL — SIGNIFICANT CHANGE UP (ref 0–0.7)
EOSINOPHIL NFR BLD AUTO: 3.1 % — SIGNIFICANT CHANGE UP (ref 0–8)
GLUCOSE SERPL-MCNC: 114 MG/DL — HIGH (ref 70–99)
HCT VFR BLD CALC: 27.8 % — LOW (ref 37–47)
HGB BLD-MCNC: 9.3 G/DL — LOW (ref 12–16)
IMM GRANULOCYTES NFR BLD AUTO: 0.3 % — SIGNIFICANT CHANGE UP (ref 0.1–0.3)
INR BLD: 4.99 RATIO — HIGH (ref 0.65–1.3)
LYMPHOCYTES # BLD AUTO: 1.72 K/UL — SIGNIFICANT CHANGE UP (ref 1.2–3.4)
LYMPHOCYTES # BLD AUTO: 19 % — LOW (ref 20.5–51.1)
MCHC RBC-ENTMCNC: 29.8 PG — SIGNIFICANT CHANGE UP (ref 27–31)
MCHC RBC-ENTMCNC: 33.5 G/DL — SIGNIFICANT CHANGE UP (ref 32–37)
MCV RBC AUTO: 89.1 FL — SIGNIFICANT CHANGE UP (ref 81–99)
MONOCYTES # BLD AUTO: 1.01 K/UL — HIGH (ref 0.1–0.6)
MONOCYTES NFR BLD AUTO: 11.2 % — HIGH (ref 1.7–9.3)
NEUTROPHILS # BLD AUTO: 5.97 K/UL — SIGNIFICANT CHANGE UP (ref 1.4–6.5)
NEUTROPHILS NFR BLD AUTO: 66.1 % — SIGNIFICANT CHANGE UP (ref 42.2–75.2)
NRBC BLD AUTO-RTO: 0 /100 WBCS — SIGNIFICANT CHANGE UP (ref 0–0)
PLATELET # BLD AUTO: 241 K/UL — SIGNIFICANT CHANGE UP (ref 130–400)
PMV BLD: 10.7 FL — HIGH (ref 7.4–10.4)
POTASSIUM SERPL-MCNC: 3.6 MMOL/L — SIGNIFICANT CHANGE UP (ref 3.5–5)
POTASSIUM SERPL-SCNC: 3.6 MMOL/L — SIGNIFICANT CHANGE UP (ref 3.5–5)
PROTHROM AB SERPL-ACNC: >40 SEC — HIGH (ref 9.95–12.87)
RBC # BLD: 3.12 M/UL — LOW (ref 4.2–5.4)
RBC # FLD: 13.7 % — SIGNIFICANT CHANGE UP (ref 11.5–14.5)
SODIUM SERPL-SCNC: 139 MMOL/L — SIGNIFICANT CHANGE UP (ref 135–146)
WBC # BLD: 9.04 K/UL — SIGNIFICANT CHANGE UP (ref 4.8–10.8)
WBC # FLD AUTO: 9.04 K/UL — SIGNIFICANT CHANGE UP (ref 4.8–10.8)

## 2025-05-31 PROCEDURE — 99239 HOSP IP/OBS DSCHRG MGMT >30: CPT

## 2025-05-31 RX ADMIN — FUROSEMIDE 40 MILLIGRAM(S): 10 INJECTION INTRAMUSCULAR; INTRAVENOUS at 05:38

## 2025-05-31 RX ADMIN — Medication 160 MILLIGRAM(S): at 05:37

## 2025-05-31 RX ADMIN — Medication 37.5 MICROGRAM(S): at 05:38

## 2025-05-31 RX ADMIN — Medication 81 MILLIGRAM(S): at 11:43

## 2025-05-31 RX ADMIN — METOPROLOL SUCCINATE 50 MILLIGRAM(S): 50 TABLET, EXTENDED RELEASE ORAL at 11:43

## 2025-05-31 RX ADMIN — Medication 1 APPLICATION(S): at 05:39

## 2025-05-31 NOTE — DISCHARGE NOTE NURSING/CASE MANAGEMENT/SOCIAL WORK - FINANCIAL ASSISTANCE
[facility] provides services at a reduced cost to those who are determined to be eligible through [facility]’s financial assistance program. Information regarding [facility]’s financial assistance program can be found by going to [email or web address] or by calling [phone].

## 2025-05-31 NOTE — DISCHARGE NOTE NURSING/CASE MANAGEMENT/SOCIAL WORK - PATIENT PORTAL LINK FT
You can access the FollowMyHealth Patient Portal offered by [facility] by registering at the following website: [email or web address]. By joining [employer] [facility]’s FollowMyHealth portal, you will also be able to view your health information using other applications (apps) compatible with our system.

## 2025-06-03 NOTE — CDI QUERY NOTE - [id number]_ALL_CORE_HH
Clinical documentation and/or evidence of the patient’s presentation, evaluation, and medical management, as evidenced below, may support a cause and effect link that is not documented in the medical record.  In order to ensure accurate coding and accuracy of the clinical record, the documentation in this patient’s medical record requires additional clarification.      If you think the supporting documentation and/or clinical evidence supports a cause and effect link, please include more specific documentation associated with these findings in your progress note and/or discharge summary.    Please clarify if there is a relationship between the R upper lip bleeding, Nosebleed, and L anterior forearm bruise/ Likely hematoma and Warfarin use, such as:  • R upper lip bleeding, Nosebleed, and L anterior forearm bruise/ Likely hematoma is associated with Warfarin use  • R upper lip bleeding, Nosebleed, and L anterior forearm bruise/ Likely hematoma is unrelated to Warfarin use   • Other (specify)    Supporting documentation and/or clinical evidence:    5/30 Progress Note Adult-Internal Medicine Resident: ... R upper lip bleeding (Resolved). Nosebleed (Resolved). L anterior forearm bruise/ Likely hematoma (Improving). Supratherapeutic INR  with minimal bleeding. Warfarin dose was doubled from 2.5mg QD to 5mg QD in April 2025. --> Per daughter, Pt was taking 10 mg by mistake afterwards. She took 10 (2 Pills of 5)  for few days before admission    5/31 Discharge Note Provider: ... presented to the ER for lip bleeding that started around 2 am on 5/26. She denies any known trauma to the site. Initially she thought the bleeding was from within her mouth or internal, later realized that it is coming from her right upper outer lip. It is a slow oozing, no profuse bleeding. Denies headache, dizziness, hemoptysis, hematemesis, melena, hematochezia, hematuria. Reports a bruise on L forearm that started prior to arriving at hospital, now somewhat increased in diameter ... Patient is very sensitive to warfarin. INR 5/30 was 5.02, Warfarin was held, and INR still 4.99. Patient to be discharged, holding warfarin until monday. Patient aware not to take medication and follow up on monday with her doctor ... PRINCIPAL DISCHARGE DIAGNOSIS: Elevated INR. Assessment and Plan of Treatment: You were admitted because your INR level, which is a measure of blood-thinning, was too high, putting you at risk for bleeding. Ensure to regularly see your primary physician to assess your INR level so that your warfarin dose can be monitored        Thank you,  Tiarra Cruz RN [facility]  [phone]

## 2025-07-08 ENCOUNTER — APPOINTMENT (OUTPATIENT)
Dept: CARDIOLOGY | Facility: CLINIC | Age: 85
End: 2025-07-08